# Patient Record
Sex: FEMALE | Race: BLACK OR AFRICAN AMERICAN | NOT HISPANIC OR LATINO | Employment: UNEMPLOYED | URBAN - METROPOLITAN AREA
[De-identification: names, ages, dates, MRNs, and addresses within clinical notes are randomized per-mention and may not be internally consistent; named-entity substitution may affect disease eponyms.]

---

## 2018-05-10 ENCOUNTER — OFFICE VISIT - HEALTHEAST (OUTPATIENT)
Dept: SURGERY | Facility: CLINIC | Age: 53
End: 2018-05-10

## 2018-05-10 ENCOUNTER — AMBULATORY - HEALTHEAST (OUTPATIENT)
Dept: LAB | Facility: CLINIC | Age: 53
End: 2018-05-10

## 2018-05-10 DIAGNOSIS — E66.01 MORBID OBESITY WITH BMI OF 45.0-49.9, ADULT (H): ICD-10-CM

## 2018-05-10 DIAGNOSIS — M19.90 ARTHRITIS: ICD-10-CM

## 2018-05-10 DIAGNOSIS — K91.2 POSTOPERATIVE MALABSORPTION: ICD-10-CM

## 2018-05-10 DIAGNOSIS — E66.01 MORBID OBESITY (H): ICD-10-CM

## 2018-05-10 DIAGNOSIS — I10 HYPERTENSION: ICD-10-CM

## 2018-05-10 DIAGNOSIS — Z79.1 PATIENT TAKES NSAID (NON-STEROID ANTI-INFLAMMATORY DRUG): ICD-10-CM

## 2018-05-10 DIAGNOSIS — K21.9 GERD (GASTROESOPHAGEAL REFLUX DISEASE): ICD-10-CM

## 2018-05-10 DIAGNOSIS — M25.569 KNEE PAIN: ICD-10-CM

## 2018-05-10 LAB
ALBUMIN SERPL-MCNC: 3.2 G/DL (ref 3.5–5)
ALP SERPL-CCNC: 107 U/L (ref 45–120)
ALT SERPL W P-5'-P-CCNC: 15 U/L (ref 0–45)
ANION GAP SERPL CALCULATED.3IONS-SCNC: 9 MMOL/L (ref 5–18)
AST SERPL W P-5'-P-CCNC: 19 U/L (ref 0–40)
BILIRUB SERPL-MCNC: 0.3 MG/DL (ref 0–1)
BUN SERPL-MCNC: 17 MG/DL (ref 8–22)
CALCIUM SERPL-MCNC: 9.3 MG/DL (ref 8.5–10.5)
CHLORIDE BLD-SCNC: 105 MMOL/L (ref 98–107)
CHOLEST SERPL-MCNC: 202 MG/DL
CO2 SERPL-SCNC: 25 MMOL/L (ref 22–31)
CREAT SERPL-MCNC: 1.02 MG/DL (ref 0.6–1.1)
ERYTHROCYTE [DISTWIDTH] IN BLOOD BY AUTOMATED COUNT: 14.4 % (ref 11–14.5)
FASTING STATUS PATIENT QL REPORTED: NO
FERRITIN SERPL-MCNC: 24 NG/ML (ref 10–130)
FOLATE SERPL-MCNC: 17.2 NG/ML
GFR SERPL CREATININE-BSD FRML MDRD: 57 ML/MIN/1.73M2
GLUCOSE BLD-MCNC: 143 MG/DL (ref 70–125)
HCT VFR BLD AUTO: 32.2 % (ref 35–47)
HDLC SERPL-MCNC: 45 MG/DL
HGB BLD-MCNC: 10.7 G/DL (ref 12–16)
LDLC SERPL CALC-MCNC: 81 MG/DL
MCH RBC QN AUTO: 29.6 PG (ref 27–34)
MCHC RBC AUTO-ENTMCNC: 33.2 G/DL (ref 32–36)
MCV RBC AUTO: 89 FL (ref 80–100)
PLATELET # BLD AUTO: 247 THOU/UL (ref 140–440)
PMV BLD AUTO: 12.4 FL (ref 8.5–12.5)
POTASSIUM BLD-SCNC: 3.4 MMOL/L (ref 3.5–5)
PROT SERPL-MCNC: 7.6 G/DL (ref 6–8)
PTH-INTACT SERPL-MCNC: 52 PG/ML (ref 10–86)
RBC # BLD AUTO: 3.61 MILL/UL (ref 3.8–5.4)
SODIUM SERPL-SCNC: 139 MMOL/L (ref 136–145)
TRIGL SERPL-MCNC: 380 MG/DL
TSH SERPL DL<=0.005 MIU/L-ACNC: 1.57 UIU/ML (ref 0.3–5)
VIT B12 SERPL-MCNC: 628 PG/ML (ref 213–816)
WBC: 6.4 THOU/UL (ref 4–11)

## 2018-05-10 ASSESSMENT — MIFFLIN-ST. JEOR: SCORE: 1993.89

## 2018-05-11 LAB
25(OH)D3 SERPL-MCNC: 24.1 NG/ML (ref 30–80)
HBA1C MFR BLD: 6.8 % (ref 4.2–6.1)

## 2018-05-12 LAB — ZINC SERPL-MCNC: 73 UG/DL (ref 60–120)

## 2018-05-13 LAB
ANNOTATION COMMENT IMP: NORMAL
VIT A SERPL-MCNC: 0.52 MG/L (ref 0.3–1.2)
VITAMIN A (RETINYL PALMITATE): 0.07 MG/L (ref 0–0.1)

## 2018-05-15 LAB — VIT B1 PYROPHOSHATE BLD-SCNC: 61 NMOL/L (ref 70–180)

## 2018-05-22 ENCOUNTER — AMBULATORY - HEALTHEAST (OUTPATIENT)
Dept: SURGERY | Facility: CLINIC | Age: 53
End: 2018-05-22

## 2018-05-22 DIAGNOSIS — E51.9 THIAMINE DEFICIENCY: ICD-10-CM

## 2018-05-22 DIAGNOSIS — K91.2 POSTOPERATIVE MALABSORPTION: ICD-10-CM

## 2018-05-22 DIAGNOSIS — D50.9 IRON DEFICIENCY ANEMIA: ICD-10-CM

## 2018-06-08 ENCOUNTER — COMMUNICATION - HEALTHEAST (OUTPATIENT)
Dept: SURGERY | Facility: CLINIC | Age: 53
End: 2018-06-08

## 2018-08-23 ENCOUNTER — AMBULATORY - HEALTHEAST (OUTPATIENT)
Dept: LAB | Facility: CLINIC | Age: 53
End: 2018-08-23

## 2018-08-23 ENCOUNTER — OFFICE VISIT - HEALTHEAST (OUTPATIENT)
Dept: SURGERY | Facility: CLINIC | Age: 53
End: 2018-08-23

## 2018-08-23 DIAGNOSIS — K91.2 POSTOPERATIVE MALABSORPTION: ICD-10-CM

## 2018-08-23 DIAGNOSIS — E66.01 MORBID OBESITY WITH BMI OF 45.0-49.9, ADULT (H): ICD-10-CM

## 2018-08-23 DIAGNOSIS — E51.9 THIAMINE DEFICIENCY: ICD-10-CM

## 2018-08-23 DIAGNOSIS — E11.9 DIABETES MELLITUS, TYPE 2 (H): ICD-10-CM

## 2018-08-23 ASSESSMENT — MIFFLIN-ST. JEOR: SCORE: 1993.89

## 2018-08-27 LAB — VIT B1 PYROPHOSHATE BLD-SCNC: 83 NMOL/L (ref 70–180)

## 2018-09-04 ENCOUNTER — AMBULATORY - HEALTHEAST (OUTPATIENT)
Dept: SURGERY | Facility: CLINIC | Age: 53
End: 2018-09-04

## 2018-09-04 DIAGNOSIS — E11.9 DIABETES MELLITUS, TYPE 2 (H): ICD-10-CM

## 2018-09-05 ENCOUNTER — COMMUNICATION - HEALTHEAST (OUTPATIENT)
Dept: SURGERY | Facility: CLINIC | Age: 53
End: 2018-09-05

## 2018-09-05 DIAGNOSIS — E11.9 DIABETES MELLITUS, TYPE 2 (H): ICD-10-CM

## 2018-09-07 ENCOUNTER — AMBULATORY - HEALTHEAST (OUTPATIENT)
Dept: SURGERY | Facility: CLINIC | Age: 53
End: 2018-09-07

## 2018-10-08 ENCOUNTER — COMMUNICATION - HEALTHEAST (OUTPATIENT)
Dept: SURGERY | Facility: CLINIC | Age: 53
End: 2018-10-08

## 2018-10-12 ENCOUNTER — COMMUNICATION - HEALTHEAST (OUTPATIENT)
Dept: SURGERY | Facility: CLINIC | Age: 53
End: 2018-10-12

## 2019-01-10 ENCOUNTER — COMMUNICATION - HEALTHEAST (OUTPATIENT)
Dept: SURGERY | Facility: CLINIC | Age: 54
End: 2019-01-10

## 2019-02-05 ENCOUNTER — COMMUNICATION - HEALTHEAST (OUTPATIENT)
Dept: SURGERY | Facility: CLINIC | Age: 54
End: 2019-02-05

## 2019-02-05 ENCOUNTER — OFFICE VISIT - HEALTHEAST (OUTPATIENT)
Dept: SURGERY | Facility: CLINIC | Age: 54
End: 2019-02-05

## 2019-02-05 ENCOUNTER — COMMUNICATION - HEALTHEAST (OUTPATIENT)
Dept: TELEHEALTH | Facility: CLINIC | Age: 54
End: 2019-02-05

## 2019-02-05 DIAGNOSIS — E11.9 TYPE 2 DIABETES MELLITUS WITHOUT COMPLICATION, WITHOUT LONG-TERM CURRENT USE OF INSULIN (H): ICD-10-CM

## 2019-02-05 DIAGNOSIS — K91.2 POSTOPERATIVE MALABSORPTION: ICD-10-CM

## 2019-02-05 DIAGNOSIS — E66.01 MORBID OBESITY WITH BMI OF 45.0-49.9, ADULT (H): ICD-10-CM

## 2019-02-05 DIAGNOSIS — K21.9 GASTROESOPHAGEAL REFLUX DISEASE WITHOUT ESOPHAGITIS: ICD-10-CM

## 2019-02-05 ASSESSMENT — MIFFLIN-ST. JEOR: SCORE: 1998.42

## 2019-02-19 ENCOUNTER — OFFICE VISIT - HEALTHEAST (OUTPATIENT)
Dept: SURGERY | Facility: CLINIC | Age: 54
End: 2019-02-19

## 2019-02-19 DIAGNOSIS — Z98.84 S/P LAPAROSCOPIC SLEEVE GASTRECTOMY: ICD-10-CM

## 2019-02-19 DIAGNOSIS — G89.29 CHRONIC PAIN OF RIGHT KNEE: ICD-10-CM

## 2019-02-19 DIAGNOSIS — E66.01 MORBID OBESITY WITH BMI OF 45.0-49.9, ADULT (H): ICD-10-CM

## 2019-02-19 DIAGNOSIS — M25.561 CHRONIC PAIN OF RIGHT KNEE: ICD-10-CM

## 2019-02-19 DIAGNOSIS — K21.9 GASTROESOPHAGEAL REFLUX DISEASE WITHOUT ESOPHAGITIS: ICD-10-CM

## 2019-02-19 DIAGNOSIS — I10 ESSENTIAL HYPERTENSION: ICD-10-CM

## 2019-02-19 DIAGNOSIS — E11.9 TYPE 2 DIABETES MELLITUS WITHOUT COMPLICATION, WITHOUT LONG-TERM CURRENT USE OF INSULIN (H): ICD-10-CM

## 2019-02-19 ASSESSMENT — MIFFLIN-ST. JEOR: SCORE: 1993.89

## 2019-02-21 ENCOUNTER — HOSPITAL ENCOUNTER (OUTPATIENT)
Dept: RADIOLOGY | Facility: CLINIC | Age: 54
Discharge: HOME | End: 2019-02-21
Attending: SPECIALIST

## 2019-02-21 ENCOUNTER — AMBULATORY - HEALTHEAST (OUTPATIENT)
Dept: LAB | Facility: CLINIC | Age: 54
End: 2019-02-21

## 2019-02-21 DIAGNOSIS — Z98.84 S/P LAPAROSCOPIC SLEEVE GASTRECTOMY: ICD-10-CM

## 2019-02-21 DIAGNOSIS — K91.2 POSTOPERATIVE MALABSORPTION: ICD-10-CM

## 2019-02-21 LAB
25(OH)D3 SERPL-MCNC: 38 NG/ML (ref 30–80)
ALBUMIN SERPL-MCNC: 3.6 G/DL (ref 3.5–5)
ALP SERPL-CCNC: 102 U/L (ref 45–120)
ALT SERPL W P-5'-P-CCNC: 14 U/L (ref 0–45)
ANION GAP SERPL CALCULATED.3IONS-SCNC: 7 MMOL/L (ref 5–18)
AST SERPL W P-5'-P-CCNC: 22 U/L (ref 0–40)
BILIRUB SERPL-MCNC: 0.4 MG/DL (ref 0–1)
BUN SERPL-MCNC: 10 MG/DL (ref 8–22)
CALCIUM SERPL-MCNC: 10 MG/DL (ref 8.5–10.5)
CHLORIDE BLD-SCNC: 107 MMOL/L (ref 98–107)
CHOLEST SERPL-MCNC: 212 MG/DL
CO2 SERPL-SCNC: 27 MMOL/L (ref 22–31)
CREAT SERPL-MCNC: 0.91 MG/DL (ref 0.6–1.1)
ERYTHROCYTE [DISTWIDTH] IN BLOOD BY AUTOMATED COUNT: 14.8 % (ref 11–14.5)
FASTING STATUS PATIENT QL REPORTED: ABNORMAL
FERRITIN SERPL-MCNC: 25 NG/ML (ref 10–130)
FOLATE SERPL-MCNC: 17.5 NG/ML
GFR SERPL CREATININE-BSD FRML MDRD: >60 ML/MIN/1.73M2
GLUCOSE BLD-MCNC: 89 MG/DL (ref 70–125)
HBA1C MFR BLD: 6.8 % (ref 4.2–6.1)
HCT VFR BLD AUTO: 35.6 % (ref 35–47)
HDLC SERPL-MCNC: 51 MG/DL
HGB BLD-MCNC: 11.6 G/DL (ref 12–16)
LDLC SERPL CALC-MCNC: 117 MG/DL
MCH RBC QN AUTO: 29.1 PG (ref 27–34)
MCHC RBC AUTO-ENTMCNC: 32.6 G/DL (ref 32–36)
MCV RBC AUTO: 89 FL (ref 80–100)
PLATELET # BLD AUTO: 310 THOU/UL (ref 140–440)
PMV BLD AUTO: 11.5 FL (ref 8.5–12.5)
POTASSIUM BLD-SCNC: 3.8 MMOL/L (ref 3.5–5)
PROT SERPL-MCNC: 8.3 G/DL (ref 6–8)
PTH-INTACT SERPL-MCNC: 74 PG/ML (ref 10–86)
RBC # BLD AUTO: 3.98 MILL/UL (ref 3.8–5.4)
SODIUM SERPL-SCNC: 141 MMOL/L (ref 136–145)
TRIGL SERPL-MCNC: 219 MG/DL
VIT B12 SERPL-MCNC: >2000 PG/ML (ref 213–816)
WBC: 6 THOU/UL (ref 4–11)

## 2019-02-23 LAB
VIT B1 PYROPHOSHATE BLD-SCNC: 147 NMOL/L (ref 70–180)
ZINC SERPL-MCNC: 84 UG/DL (ref 60–120)

## 2019-02-24 LAB
ANNOTATION COMMENT IMP: NORMAL
VIT A SERPL-MCNC: 0.66 MG/L (ref 0.3–1.2)
VITAMIN A (RETINYL PALMITATE): 0.02 MG/L (ref 0–0.1)

## 2019-02-25 ENCOUNTER — OFFICE VISIT - HEALTHEAST (OUTPATIENT)
Dept: SURGERY | Facility: CLINIC | Age: 54
End: 2019-02-25

## 2019-02-25 DIAGNOSIS — E66.01 MORBID OBESITY (H): ICD-10-CM

## 2019-03-01 ENCOUNTER — COMMUNICATION - HEALTHEAST (OUTPATIENT)
Dept: SURGERY | Facility: CLINIC | Age: 54
End: 2019-03-01

## 2019-03-05 ENCOUNTER — OFFICE VISIT - HEALTHEAST (OUTPATIENT)
Dept: SURGERY | Facility: CLINIC | Age: 54
End: 2019-03-05

## 2019-03-05 DIAGNOSIS — E11.9 TYPE 2 DIABETES MELLITUS WITHOUT COMPLICATION, WITHOUT LONG-TERM CURRENT USE OF INSULIN (H): ICD-10-CM

## 2019-03-05 DIAGNOSIS — E66.01 OBESITY, MORBID, BMI 40.0-49.9 (H): ICD-10-CM

## 2019-03-05 DIAGNOSIS — Z98.84 S/P LAPAROSCOPIC SLEEVE GASTRECTOMY: ICD-10-CM

## 2019-03-05 DIAGNOSIS — Z71.3 DIETARY COUNSELING: ICD-10-CM

## 2019-03-05 ASSESSMENT — MIFFLIN-ST. JEOR: SCORE: 1993.89

## 2019-03-18 ENCOUNTER — OFFICE VISIT - HEALTHEAST (OUTPATIENT)
Dept: SURGERY | Facility: CLINIC | Age: 54
End: 2019-03-18

## 2019-03-18 DIAGNOSIS — E66.01 MORBID OBESITY (H): ICD-10-CM

## 2019-04-02 ENCOUNTER — OFFICE VISIT - HEALTHEAST (OUTPATIENT)
Dept: SURGERY | Facility: CLINIC | Age: 54
End: 2019-04-02

## 2019-04-02 DIAGNOSIS — E66.01 OBESITY, MORBID, BMI 40.0-49.9 (H): ICD-10-CM

## 2019-04-02 DIAGNOSIS — Z98.84 S/P LAPAROSCOPIC SLEEVE GASTRECTOMY: ICD-10-CM

## 2019-04-02 DIAGNOSIS — Z71.3 DIETARY COUNSELING: ICD-10-CM

## 2019-04-02 ASSESSMENT — MIFFLIN-ST. JEOR: SCORE: 1993.89

## 2019-04-08 ENCOUNTER — OFFICE VISIT - HEALTHEAST (OUTPATIENT)
Dept: SURGERY | Facility: CLINIC | Age: 54
End: 2019-04-08

## 2019-04-08 DIAGNOSIS — E66.01 MORBID OBESITY (H): ICD-10-CM

## 2019-04-16 ENCOUNTER — OFFICE VISIT - HEALTHEAST (OUTPATIENT)
Dept: SURGERY | Facility: CLINIC | Age: 54
End: 2019-04-16

## 2019-04-16 DIAGNOSIS — E11.9 TYPE 2 DIABETES MELLITUS WITHOUT COMPLICATION, WITHOUT LONG-TERM CURRENT USE OF INSULIN (H): ICD-10-CM

## 2019-04-16 DIAGNOSIS — M17.11 PRIMARY OSTEOARTHRITIS OF RIGHT KNEE: ICD-10-CM

## 2019-04-16 DIAGNOSIS — Z90.3 HISTORY OF SLEEVE GASTRECTOMY: ICD-10-CM

## 2019-04-16 DIAGNOSIS — E66.01 MORBID OBESITY WITH BMI OF 45.0-49.9, ADULT (H): ICD-10-CM

## 2019-04-16 ASSESSMENT — MIFFLIN-ST. JEOR
SCORE: 2002.96
SCORE: 2002.96

## 2019-04-18 ENCOUNTER — AMBULATORY - HEALTHEAST (OUTPATIENT)
Dept: SURGERY | Facility: CLINIC | Age: 54
End: 2019-04-18

## 2019-05-06 ENCOUNTER — AMBULATORY - HEALTHEAST (OUTPATIENT)
Dept: SURGERY | Facility: CLINIC | Age: 54
End: 2019-05-06

## 2019-08-19 ENCOUNTER — RECORDS - HEALTHEAST (OUTPATIENT)
Dept: ADMINISTRATIVE | Facility: OTHER | Age: 54
End: 2019-08-19

## 2019-08-20 ENCOUNTER — AMBULATORY - HEALTHEAST (OUTPATIENT)
Dept: SURGERY | Facility: CLINIC | Age: 54
End: 2019-08-20

## 2019-08-20 DIAGNOSIS — Z01.818 PRE-OP TESTING: ICD-10-CM

## 2019-08-21 ENCOUNTER — AMBULATORY - HEALTHEAST (OUTPATIENT)
Dept: SURGERY | Facility: CLINIC | Age: 54
End: 2019-08-21

## 2019-08-21 DIAGNOSIS — K91.2 POSTSURGICAL MALABSORPTION: ICD-10-CM

## 2019-08-21 DIAGNOSIS — K21.9 ACID REFLUX: ICD-10-CM

## 2019-08-21 DIAGNOSIS — K90.9 INTESTINAL MALABSORPTION, UNSPECIFIED: ICD-10-CM

## 2019-08-21 DIAGNOSIS — Z98.84 S/P BARIATRIC SURGERY: ICD-10-CM

## 2019-08-21 DIAGNOSIS — R63.4 RAPID WEIGHT LOSS: ICD-10-CM

## 2019-08-21 DIAGNOSIS — Z71.89 ENCOUNTER FOR PRE-BARIATRIC SURGERY COUNSELING AND EDUCATION: ICD-10-CM

## 2019-08-21 ASSESSMENT — MIFFLIN-ST. JEOR: SCORE: 1962.13

## 2019-08-27 ENCOUNTER — RECORDS - HEALTHEAST (OUTPATIENT)
Dept: ADMINISTRATIVE | Facility: OTHER | Age: 54
End: 2019-08-27

## 2019-09-03 ENCOUNTER — RECORDS - HEALTHEAST (OUTPATIENT)
Dept: ADMINISTRATIVE | Facility: OTHER | Age: 54
End: 2019-09-03

## 2019-09-10 ENCOUNTER — RECORDS - HEALTHEAST (OUTPATIENT)
Dept: ADMINISTRATIVE | Facility: OTHER | Age: 54
End: 2019-09-10

## 2019-09-10 ENCOUNTER — ANESTHESIA - HEALTHEAST (OUTPATIENT)
Dept: SURGERY | Facility: CLINIC | Age: 54
End: 2019-09-10

## 2019-12-02 ENCOUNTER — HOSPITAL ENCOUNTER (EMERGENCY)
Facility: CLINIC | Age: 54
Discharge: HOME | End: 2019-12-02
Attending: PHYSICIAN ASSISTANT | Admitting: PHYSICIAN ASSISTANT
Payer: MEDICAID

## 2019-12-02 ENCOUNTER — APPOINTMENT (OUTPATIENT)
Dept: GENERAL RADIOLOGY | Facility: CLINIC | Age: 54
End: 2019-12-02
Attending: PHYSICIAN ASSISTANT
Payer: MEDICAID

## 2019-12-02 VITALS
TEMPERATURE: 99.9 F | DIASTOLIC BLOOD PRESSURE: 73 MMHG | SYSTOLIC BLOOD PRESSURE: 122 MMHG | OXYGEN SATURATION: 99 % | RESPIRATION RATE: 18 BRPM | WEIGHT: 288.8 LBS | HEART RATE: 77 BPM

## 2019-12-02 DIAGNOSIS — R07.9 CHEST PAIN: ICD-10-CM

## 2019-12-02 DIAGNOSIS — R05.9 COUGH: ICD-10-CM

## 2019-12-02 LAB
ANION GAP SERPL CALCULATED.3IONS-SCNC: 3 MMOL/L (ref 3–14)
BASOPHILS # BLD AUTO: 0 10E9/L (ref 0–0.2)
BASOPHILS NFR BLD AUTO: 0.2 %
BUN SERPL-MCNC: 12 MG/DL (ref 7–30)
CALCIUM SERPL-MCNC: 8.8 MG/DL (ref 8.5–10.1)
CHLORIDE SERPL-SCNC: 111 MMOL/L (ref 94–109)
CO2 SERPL-SCNC: 27 MMOL/L (ref 20–32)
CREAT SERPL-MCNC: 0.82 MG/DL (ref 0.52–1.04)
D DIMER PPP FEU-MCNC: 0.4 UG/ML FEU (ref 0–0.5)
DIFFERENTIAL METHOD BLD: ABNORMAL
EOSINOPHIL # BLD AUTO: 0.3 10E9/L (ref 0–0.7)
EOSINOPHIL NFR BLD AUTO: 3.4 %
ERYTHROCYTE [DISTWIDTH] IN BLOOD BY AUTOMATED COUNT: 14.5 % (ref 10–15)
FLUAV+FLUBV AG SPEC QL: NEGATIVE
FLUAV+FLUBV AG SPEC QL: NEGATIVE
GFR SERPL CREATININE-BSD FRML MDRD: 81 ML/MIN/{1.73_M2}
GLUCOSE SERPL-MCNC: 118 MG/DL (ref 70–99)
HCT VFR BLD AUTO: 31.7 % (ref 35–47)
HGB BLD-MCNC: 10.3 G/DL (ref 11.7–15.7)
IMM GRANULOCYTES # BLD: 0 10E9/L (ref 0–0.4)
IMM GRANULOCYTES NFR BLD: 0.4 %
INTERPRETATION ECG - MUSE: NORMAL
LYMPHOCYTES # BLD AUTO: 2.6 10E9/L (ref 0.8–5.3)
LYMPHOCYTES NFR BLD AUTO: 31.5 %
MCH RBC QN AUTO: 28.7 PG (ref 26.5–33)
MCHC RBC AUTO-ENTMCNC: 32.5 G/DL (ref 31.5–36.5)
MCV RBC AUTO: 88 FL (ref 78–100)
MONOCYTES # BLD AUTO: 0.4 10E9/L (ref 0–1.3)
MONOCYTES NFR BLD AUTO: 4.8 %
NEUTROPHILS # BLD AUTO: 4.9 10E9/L (ref 1.6–8.3)
NEUTROPHILS NFR BLD AUTO: 59.7 %
NRBC # BLD AUTO: 0 10*3/UL
NRBC BLD AUTO-RTO: 0 /100
PLATELET # BLD AUTO: 250 10E9/L (ref 150–450)
POTASSIUM SERPL-SCNC: 3.4 MMOL/L (ref 3.4–5.3)
RBC # BLD AUTO: 3.59 10E12/L (ref 3.8–5.2)
SODIUM SERPL-SCNC: 141 MMOL/L (ref 133–144)
SPECIMEN SOURCE: NORMAL
TROPONIN I SERPL-MCNC: <0.015 UG/L (ref 0–0.04)
WBC # BLD AUTO: 8.2 10E9/L (ref 4–11)

## 2019-12-02 PROCEDURE — 25000132 ZZH RX MED GY IP 250 OP 250 PS 637: Performed by: PHYSICIAN ASSISTANT

## 2019-12-02 PROCEDURE — 93005 ELECTROCARDIOGRAM TRACING: CPT

## 2019-12-02 PROCEDURE — 94640 AIRWAY INHALATION TREATMENT: CPT

## 2019-12-02 PROCEDURE — 99285 EMERGENCY DEPT VISIT HI MDM: CPT | Mod: 25

## 2019-12-02 PROCEDURE — 85379 FIBRIN DEGRADATION QUANT: CPT | Performed by: PHYSICIAN ASSISTANT

## 2019-12-02 PROCEDURE — 25000125 ZZHC RX 250: Performed by: PHYSICIAN ASSISTANT

## 2019-12-02 PROCEDURE — 71046 X-RAY EXAM CHEST 2 VIEWS: CPT

## 2019-12-02 PROCEDURE — 80048 BASIC METABOLIC PNL TOTAL CA: CPT | Performed by: PHYSICIAN ASSISTANT

## 2019-12-02 PROCEDURE — 84484 ASSAY OF TROPONIN QUANT: CPT | Performed by: PHYSICIAN ASSISTANT

## 2019-12-02 PROCEDURE — 85025 COMPLETE CBC W/AUTO DIFF WBC: CPT | Performed by: PHYSICIAN ASSISTANT

## 2019-12-02 PROCEDURE — 87804 INFLUENZA ASSAY W/OPTIC: CPT | Mod: 59 | Performed by: PHYSICIAN ASSISTANT

## 2019-12-02 PROCEDURE — 87804 INFLUENZA ASSAY W/OPTIC: CPT | Performed by: PHYSICIAN ASSISTANT

## 2019-12-02 RX ORDER — ASPIRIN 81 MG/1
324 TABLET, CHEWABLE ORAL ONCE
Status: COMPLETED | OUTPATIENT
Start: 2019-12-02 | End: 2019-12-02

## 2019-12-02 RX ORDER — KETOROLAC TROMETHAMINE 15 MG/ML
15 INJECTION, SOLUTION INTRAMUSCULAR; INTRAVENOUS ONCE
Status: DISCONTINUED | OUTPATIENT
Start: 2019-12-02 | End: 2019-12-02 | Stop reason: HOSPADM

## 2019-12-02 RX ORDER — OXYCODONE HYDROCHLORIDE 5 MG/1
5 TABLET ORAL ONCE
Status: COMPLETED | OUTPATIENT
Start: 2019-12-02 | End: 2019-12-02

## 2019-12-02 RX ORDER — IPRATROPIUM BROMIDE AND ALBUTEROL SULFATE 2.5; .5 MG/3ML; MG/3ML
3 SOLUTION RESPIRATORY (INHALATION) ONCE
Status: COMPLETED | OUTPATIENT
Start: 2019-12-02 | End: 2019-12-02

## 2019-12-02 RX ORDER — BENZONATATE 200 MG/1
200 CAPSULE ORAL 3 TIMES DAILY PRN
Qty: 15 CAPSULE | Refills: 0 | Status: SHIPPED | OUTPATIENT
Start: 2019-12-02 | End: 2021-10-01

## 2019-12-02 RX ADMIN — ASPIRIN 81 MG 324 MG: 81 TABLET ORAL at 18:08

## 2019-12-02 RX ADMIN — IPRATROPIUM BROMIDE AND ALBUTEROL SULFATE 3 ML: .5; 3 SOLUTION RESPIRATORY (INHALATION) at 18:08

## 2019-12-02 RX ADMIN — OXYCODONE HYDROCHLORIDE 5 MG: 5 TABLET ORAL at 19:46

## 2019-12-02 ASSESSMENT — ENCOUNTER SYMPTOMS
DYSURIA: 0
HEMATURIA: 0
RHINORRHEA: 1
SORE THROAT: 1
COUGH: 1
SHORTNESS OF BREATH: 1

## 2019-12-02 NOTE — LETTER
December 2, 2019      To Whom It May Concern:      Shana Ventura was seen in our Emergency Department today, 12/02/19. Please excuse her from work from 12/2-12/3. I expect her condition to improve over the next 2-3 days.  She may return to work/school when improved.    Sincerely,        Haley Ray PA-C

## 2019-12-02 NOTE — ED TRIAGE NOTES
Pt having cough and cold symptoms for a few days.  Today pt c/o more difficulty with breathing and pain in the bilateral ribs.

## 2019-12-02 NOTE — ED PROVIDER NOTES
History     Chief Complaint:  Cough and Shortness of Breath      HPI   Shana Ventura is a 54 year old female with a history of type 2 diabetes and tobacco use for the past two months (5-6 cigarettes daily), who presents with cough and cold symptoms for the past few days, with associated pluritic bilateral rib pain, left worse than right that radiates to her chest. Patient reports that she has had to take shallow breaths since onset, which has made her feel short of breath. She also endorses rhinorrhea, sore throat, and left ear pain. She describes worsened rib pain when taking a deep breath, when coughing, or when laying down. She reports taking sinus and cold medicine and Nyquil, with no relief. She notes that her son had similar symptoms recently and says he was sleeping in their bed when he was sick. 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 Denies leg pain or swelling, dysuria, hematuria, rash, or any other complaints. No history of asthma. No personal or family history of blood clots in the legs or lungs.     Allergies:  Codeine sulfate  Penicillins     Medications:    Toradol   Aspirin 81 mg   Zofran  Elavil  Cymbalta    Past Medical History:    DM 2  DKA  Obesity  Breast abscess   Varicella   Headache  Urinary incontinence, stress female  Carbuncle and furuncle of unspecified site    Past Surgical History:    Hysterectomy  Carpal tunnel release  SD ligate fallopian tube  Lap sleeve gastrectomy & lap umbilical hernia repair     Family History:    History reviewed. No pertinent family history.      Social History:  Smoking status: former   Alcohol use: no   PCP: Natalia Sanchez  Marital Status:        Review of Systems   HENT: Positive for congestion, ear pain, rhinorrhea and sore throat.    Respiratory: Positive for cough and shortness of breath.    Cardiovascular: Negative for chest pain and leg swelling.   Genitourinary: Negative for dysuria and hematuria.    Musculoskeletal:        Positive for rib pain.    Skin: Negative for rash.   All other systems reviewed and are negative.    Physical Exam     Patient Vitals for the past 24 hrs:   BP Temp Temp src Pulse Heart Rate Resp SpO2 Weight   12/02/19 1945 122/73 -- -- 77 -- -- 99 % --   12/02/19 1915 113/71 -- -- 80 86 -- 94 % --   12/02/19 1900 121/76 -- -- 89 86 -- 95 % --   12/02/19 1800 139/86 -- -- 71 68 -- 100 % --   12/02/19 1607 (!) 157/132 99.9  F (37.7  C) Oral 77 -- 18 100 % 131 kg (288 lb 12.8 oz)      Physical Exam  General: Resting comfortably.  Alert and oriented.   Head:  The scalp, face, and head appear normal   Eyes:  Conjunctivae and sclerae are normal    ENT:    The oropharynx is normal    Uvula is in the midline    Neck:  No lymphadenopathy  CV:  Regular rate and rhythm     Normal S1/S2    Peripheral pulses intact in all 4 extremities.  Resp:  Lungs are clear to auscultation    Non-labored    No rales or wheezing   MS:  Moving all 4 extremities with good strength.  Ambulatory.  Skin:  No rash or acute skin lesions noted   Neuro: Speech is normal and fluent.     Emergency Department Course   ECG:  ECG (17:39:24):  Rate 63 bpm. AZ interval 164. QRS duration 78. QT/QTc 420/429. P-R-T axes 37 10 21. Normal sinus rhythm. Normal ECG. Agree with computer interpretation. No prior EKG for comparison. Interpreted at 1748 by Leonardo Cordoba MD.     Imaging:  Radiographic findings were communicated with the patient who voiced understanding of the findings.    Chest XR, PA & LAT  Lungs clear. Heart size normal. Degenerative change thoracic spine  As read by Radiology.    Laboratory:  D dimer: 0.4  BMP: Glucose 118 (H), chloride 111 (H), o/w WNL (Creatinine: 0.82)  CBC: WBC 8.2, HGB 10.3,    Troponin 1 (1820): <0.015    Influenza A/B antigen: Influenza A negative, Influenza B negative     Interventions:  1808: aspirin 324 mg PO  1808: Duoneb 3 ml Nebulization   1946: Oxycodone 5 mg PO  1947: Toradol 15 mg  IV    Emergency Department Course:  1710: Nursing notes and vitals reviewed. I performed an exam of the patient as documented above.     Medicine administered as documented above. Blood drawn. Influenza swab taken. This was sent to the lab for further testing, results above.    The patient was sent for a chest xray while in the emergency department, findings above.     1852: I rechecked the patient and discussed the results of her workup thus far.     2011:  I rechecked the patient and discussed the results of her workup thus far.     Findings and plan explained to the Patient. Patient discharged home with instructions regarding supportive care, medications, and reasons to return. The importance of close follow-up was reviewed. The patient was prescribed Tessalon.    I personally reviewed the laboratory results with the Patient and answered all related questions prior to discharge.       Impression & Plan      Medical Decision Making:  Shana Ventura is a 54 year old female who presents for evaluation of cough, associated pleuritic chest pain.  Please refer the HPI for further details.  A broad differential was considered including but not limited to ACS, PE, dissection, pleural effusion, pericarditis, myocarditis, pneumonia, pleurisy, costochondritis, pneumothorax, among others.  EKG is  normal without signs of ischemia or arrhythmia.  Troponin is within normal limits.  Labs are unremarkable.  D-dimer is normal, therefore I do not believe further work-up for PE needs to be pursued at this time given the likelihood of PE is very unlikely given the negative d-dimer.  Chest x-ray is obtained and was negative.  Overall, I believe the patient is safe to discharge home.  This is likely costochondritis or pleurisy.  No signs of life-threatening etiologies to suggest need for further work-up at this time.  Will treat cough with Tessalon.  Patient cannot take ibuprofen as she has had a history of gastric bypass.  Suggested  use of Tylenol.  She will follow-up with her primary physician in 2 to 3 days for recheck.  She was asked to return immediately for any uncontrolled fevers, worsening symptoms, new/worsening chest pain, shortness of breath, or any other concerns.  All questions were answered prior to discharge.  Patient understands and agrees to this plan.    Diagnosis:    ICD-10-CM    1. Cough R05    2. Chest pain R07.9        Disposition:  discharged to home    Discharge Medications:  Discharge Medication List as of 12/2/2019  8:17 PM      START taking these medications    Details   benzonatate (TESSALON) 200 MG capsule Take 1 capsule (200 mg) by mouth 3 times daily as needed for cough, Disp-15 capsule, R-0, Local Print           ILidia, am serving as a scribe at 5:10 PM on 12/2/2019 to document services personally performed by Haley Ray PA based on my observations and the provider's statements to me.        Lidia Godinez  12/2/2019   [facility] EMERGENCY DEPARTMENT       Haley Ray PA-C  12/02/19 [id number]

## 2019-12-02 NOTE — ED AVS SNAPSHOT
[facility]  [address]  [facility] [address]  Phone:  [phone]  Fax:  [phone]                                    Shana Ventura   MRN: [id number]    Department:  [facility]   Date of Visit:  12/2/2019           After Visit Summary Signature Page    I have received my discharge instructions, and my questions have been answered. I have discussed any challenges I see with this plan with the nurse or doctor.    ..........................................................................................................................................  Patient/Patient Representative Signature      ..........................................................................................................................................  Patient Representative Print Name and Relationship to Patient    ..................................................               ................................................  Date                                   Time    ..........................................................................................................................................  Reviewed by Signature/Title    ...................................................              ..............................................  Date                                               Time          22EPIC Rev 08/18

## 2019-12-03 NOTE — DISCHARGE INSTRUCTIONS

## 2020-06-12 ENCOUNTER — COMMUNICATION - HEALTHEAST (OUTPATIENT)
Dept: SURGERY | Facility: CLINIC | Age: 55
End: 2020-06-12

## 2020-06-12 DIAGNOSIS — K90.9 INTESTINAL MALABSORPTION, UNSPECIFIED: ICD-10-CM

## 2020-06-12 DIAGNOSIS — Z98.84 S/P BARIATRIC SURGERY: ICD-10-CM

## 2020-06-12 DIAGNOSIS — K91.2 POSTSURGICAL MALABSORPTION: ICD-10-CM

## 2020-07-21 ENCOUNTER — OFFICE VISIT - HEALTHEAST (OUTPATIENT)
Dept: SURGERY | Facility: CLINIC | Age: 55
End: 2020-07-21

## 2020-07-21 DIAGNOSIS — K91.2 POSTOPERATIVE MALABSORPTION: ICD-10-CM

## 2020-07-21 DIAGNOSIS — K21.9 GASTROESOPHAGEAL REFLUX DISEASE WITHOUT ESOPHAGITIS: ICD-10-CM

## 2020-07-21 DIAGNOSIS — R63.8 DIFFICULTY EATING: ICD-10-CM

## 2020-07-21 ASSESSMENT — MIFFLIN-ST. JEOR: SCORE: 1957.6

## 2020-07-23 ENCOUNTER — AMBULATORY - HEALTHEAST (OUTPATIENT)
Dept: LAB | Facility: CLINIC | Age: 55
End: 2020-07-23

## 2020-07-23 ENCOUNTER — HOSPITAL ENCOUNTER (OUTPATIENT)
Dept: RADIOLOGY | Facility: CLINIC | Age: 55
Discharge: HOME | End: 2020-07-23
Attending: FAMILY MEDICINE

## 2020-07-23 DIAGNOSIS — R63.8 DIFFICULTY EATING: ICD-10-CM

## 2020-07-23 DIAGNOSIS — K91.2 POSTOPERATIVE MALABSORPTION: ICD-10-CM

## 2020-07-23 LAB
25(OH)D3 SERPL-MCNC: 31.1 NG/ML (ref 30–80)
ALBUMIN SERPL-MCNC: 3.7 G/DL (ref 3.5–5)
ALP SERPL-CCNC: 114 U/L (ref 45–120)
ALT SERPL W P-5'-P-CCNC: 19 U/L (ref 0–45)
ANION GAP SERPL CALCULATED.3IONS-SCNC: 8 MMOL/L (ref 5–18)
AST SERPL W P-5'-P-CCNC: 27 U/L (ref 0–40)
BILIRUB SERPL-MCNC: 0.3 MG/DL (ref 0–1)
BUN SERPL-MCNC: 14 MG/DL (ref 8–22)
CALCIUM SERPL-MCNC: 9.7 MG/DL (ref 8.5–10.5)
CHLORIDE BLD-SCNC: 104 MMOL/L (ref 98–107)
CHOLEST SERPL-MCNC: 194 MG/DL
CO2 SERPL-SCNC: 26 MMOL/L (ref 22–31)
CREAT SERPL-MCNC: 0.89 MG/DL (ref 0.6–1.1)
ERYTHROCYTE [DISTWIDTH] IN BLOOD BY AUTOMATED COUNT: 15.7 % (ref 11–14.5)
FASTING STATUS PATIENT QL REPORTED: YES
FERRITIN SERPL-MCNC: 15 NG/ML (ref 10–130)
FOLATE SERPL-MCNC: 17.3 NG/ML
GFR SERPL CREATININE-BSD FRML MDRD: >60 ML/MIN/1.73M2
GLUCOSE BLD-MCNC: 118 MG/DL (ref 70–125)
HBA1C MFR BLD: 8.1 %
HCT VFR BLD AUTO: 37.2 % (ref 35–47)
HDLC SERPL-MCNC: 48 MG/DL
HGB BLD-MCNC: 11.5 G/DL (ref 12–16)
LDLC SERPL CALC-MCNC: 107 MG/DL
MCH RBC QN AUTO: 26.4 PG (ref 27–34)
MCHC RBC AUTO-ENTMCNC: 30.9 G/DL (ref 32–36)
MCV RBC AUTO: 86 FL (ref 80–100)
PLATELET # BLD AUTO: 242 THOU/UL (ref 140–440)
PMV BLD AUTO: 12.4 FL (ref 8.5–12.5)
POTASSIUM BLD-SCNC: 4.4 MMOL/L (ref 3.5–5)
PROT SERPL-MCNC: 7.7 G/DL (ref 6–8)
PTH-INTACT SERPL-MCNC: 92 PG/ML (ref 10–86)
RBC # BLD AUTO: 4.35 MILL/UL (ref 3.8–5.4)
SODIUM SERPL-SCNC: 138 MMOL/L (ref 136–145)
TRIGL SERPL-MCNC: 196 MG/DL
VIT B12 SERPL-MCNC: 809 PG/ML (ref 213–816)
WBC: 6.4 THOU/UL (ref 4–11)

## 2020-07-24 ENCOUNTER — COMMUNICATION - HEALTHEAST (OUTPATIENT)
Dept: SURGERY | Facility: CLINIC | Age: 55
End: 2020-07-24

## 2020-07-25 LAB
ANNOTATION COMMENT IMP: NORMAL
VIT A SERPL-MCNC: 0.54 MG/L (ref 0.3–1.2)
VITAMIN A (RETINYL PALMITATE): <0.02 MG/L (ref 0–0.1)

## 2020-07-26 LAB
VIT B1 PYROPHOSHATE BLD-SCNC: 79 NMOL/L (ref 70–180)
ZINC SERPL-MCNC: 71.4 UG/DL (ref 60–120)

## 2020-07-31 ENCOUNTER — COMMUNICATION - HEALTHEAST (OUTPATIENT)
Dept: SURGERY | Facility: CLINIC | Age: 55
End: 2020-07-31

## 2020-10-07 ENCOUNTER — COMMUNICATION - HEALTHEAST (OUTPATIENT)
Dept: SURGERY | Facility: CLINIC | Age: 55
End: 2020-10-07

## 2020-10-07 DIAGNOSIS — K90.9 INTESTINAL MALABSORPTION, UNSPECIFIED: ICD-10-CM

## 2020-10-07 DIAGNOSIS — K91.2 POSTOPERATIVE MALABSORPTION: ICD-10-CM

## 2020-10-07 DIAGNOSIS — E21.1 HYPERPARATHYROIDISM DUE TO VITAMIN D DEFICIENCY (H): ICD-10-CM

## 2020-10-07 DIAGNOSIS — E11.9 TYPE 2 DIABETES MELLITUS WITHOUT COMPLICATION, WITHOUT LONG-TERM CURRENT USE OF INSULIN (H): ICD-10-CM

## 2021-01-14 ENCOUNTER — OFFICE VISIT - HEALTHEAST (OUTPATIENT)
Dept: SURGERY | Facility: CLINIC | Age: 56
End: 2021-01-14

## 2021-01-14 DIAGNOSIS — E66.01 MORBID OBESITY WITH BMI OF 45.0-49.9, ADULT (H): ICD-10-CM

## 2021-01-14 DIAGNOSIS — E11.9 TYPE 2 DIABETES MELLITUS WITHOUT COMPLICATION, WITHOUT LONG-TERM CURRENT USE OF INSULIN (H): ICD-10-CM

## 2021-01-14 DIAGNOSIS — K91.2 POSTOPERATIVE MALABSORPTION: ICD-10-CM

## 2021-01-14 ASSESSMENT — MIFFLIN-ST. JEOR: SCORE: 1980.28

## 2021-01-20 ENCOUNTER — COMMUNICATION - HEALTHEAST (OUTPATIENT)
Dept: SURGERY | Facility: CLINIC | Age: 56
End: 2021-01-20

## 2021-01-20 ENCOUNTER — OFFICE VISIT - HEALTHEAST (OUTPATIENT)
Dept: SURGERY | Facility: CLINIC | Age: 56
End: 2021-01-20

## 2021-01-20 DIAGNOSIS — E11.9 TYPE 2 DIABETES MELLITUS WITHOUT COMPLICATION, WITHOUT LONG-TERM CURRENT USE OF INSULIN (H): ICD-10-CM

## 2021-01-20 DIAGNOSIS — Z98.84 BARIATRIC SURGERY STATUS: ICD-10-CM

## 2021-01-20 DIAGNOSIS — E66.01 MORBID OBESITY WITH BMI OF 45.0-49.9, ADULT (H): ICD-10-CM

## 2021-02-05 ENCOUNTER — OFFICE VISIT - HEALTHEAST (OUTPATIENT)
Dept: SURGERY | Facility: CLINIC | Age: 56
End: 2021-02-05

## 2021-02-05 DIAGNOSIS — E66.01 MORBID OBESITY WITH BMI OF 45.0-49.9, ADULT (H): ICD-10-CM

## 2021-03-08 ENCOUNTER — OFFICE VISIT - HEALTHEAST (OUTPATIENT)
Dept: SURGERY | Facility: CLINIC | Age: 56
End: 2021-03-08

## 2021-03-08 DIAGNOSIS — E66.01 MORBID OBESITY WITH BMI OF 45.0-49.9, ADULT (H): ICD-10-CM

## 2021-03-29 ENCOUNTER — COMMUNICATION - HEALTHEAST (OUTPATIENT)
Dept: SURGERY | Facility: CLINIC | Age: 56
End: 2021-03-29

## 2021-03-29 DIAGNOSIS — Z98.84 S/P BARIATRIC SURGERY: ICD-10-CM

## 2021-03-29 DIAGNOSIS — K91.2 POSTSURGICAL MALABSORPTION: ICD-10-CM

## 2021-03-29 DIAGNOSIS — K90.9 INTESTINAL MALABSORPTION, UNSPECIFIED: ICD-10-CM

## 2021-05-27 NOTE — PROGRESS NOTES
I have submitted a prior authorization request on behalf of this patient to her Primary [employer] as well as her Medicaid insurances to be approved for a revision of her LSG to a LRNY with Dr. Catarino Nathan.

## 2021-05-27 NOTE — PROGRESS NOTES
HPI: Shana Ventura is a 53 y.o. female who is status post a laparoscopic sleeve gastrectomy in .  At that time she weighed about 308 pounds.  She is able to lose a significant amount of weight after the sleeve gastrectomy.  She was diabetic at the time of surgery and that resolved.  However she states in  she had several family crises that occurred.  She had 2 sisters  within a short time as well as her father.  She states this caused quite a bit of stress eating and after that time she began to regain her weight.  She also noticed that with her weight gain she redeveloped her diabetes.  She was started on metformin.  She states that her A1c's were always in fairly good control and her A1c's that we have checked have been under 7.  She has been mostly on metformin but had Victoza added recently to help with some weight loss.  Efforts to lose weight if not been successful and she continues to gain weight.  She also has fairly significant degenerative joint disease and has been receiving injections into her right knee to help her with the discomfort.  She is been told by her orthopedic surgeon that she is really on the young side to have a total joint and it would be better to lose weight and see if that does not help with her comfort level and mobility.  She is interested in having her sleeve gastrectomy converted to a different operation to help achieve weight loss and get her diabetes resolved.      Allergies:Penicillins and Codeine    Past Medical History:   Diagnosis Date     Arthritis     right knee     Diabetes mellitus (H)      Fibromyalgia, primary      GERD (gastroesophageal reflux disease)     after her sleeve     Knee pain      Morbid obesity (H)        Past Surgical History:   Procedure Laterality Date      SECTION, CLASSIC  1997     GASTRECTOMY LAPAROSCOPIC SLEEVE      Dr. Nathan     PARTIAL HYSTERECTOMY         CURRENT MEDS:  Current Outpatient Medications  "  Medication Sig Dispense Refill     cholecalciferol, vitamin D3, (VITAMIN D3) 5,000 unit Tab Take 1 tablet (5,000 Units total) by mouth daily. 90 tablet prn     cyanocobalamin, vitamin B-12, 1,000 mcg Subl Place 1 tablet (1,000 mcg total) under the tongue daily. 90 tablet prn     ergocalciferol (ERGOCALCIFEROL) 50,000 unit capsule Take 50,000 Units by mouth once a week.       hydroCHLOROthiazide (HYDRODIURIL) 25 MG tablet Take 25 mg by mouth daily.       liraglutide (VICTOZA) 0.6 mg/0.1 mL (18 mg/3 mL) PnIj injection Inject 1.8 mg under the skin daily. 27 mL prn     metFORMIN (GLUCOPHAGE) 1000 MG tablet Take 1,000 mg by mouth daily.       omeprazole (PRILOSEC) 20 MG capsule Take 1 capsule (20 mg total) by mouth daily. 90 capsule prn     pen needle, diabetic 32 gauge x 5/32\" Ndle Inject 1 Units under the skin daily. 100 each prn     PRENATAL VITAMIN PLUS LOW IRON 27 mg iron- 1 mg Tab Take 2 tablets by mouth daily.  2     thiamine HCl, vitamin B1, (VITAMIN B-1) 250 MG tablet Take 1 tablet (250 mg total) by mouth daily. 90 tablet prn     triamcinolone (KENALOG) 0.1 % ointment Apply thin layer on effected area       No current facility-administered medications for this visit.          Family History   Problem Relation Age of Onset     Cancer Mother      Diabetes Father      Obesity Sister         reports that she has quit smoking. She has never used smokeless tobacco. She reports that she does not drink alcohol or use drugs.    Review of Systems -  A 12 point ROS was reviewed and except for what is listed in the HPI above, all others are negative  PSYCHIATRIC: She has undergone a lifestyle assessment and has been deemed a good candidate for bariatric surgery by the psychologist.    /69   Pulse 70   Resp 18   Ht 5' 7\" (1.702 m)   Wt (!) 301 lb (136.5 kg)   SpO2 100%   BMI 47.14 kg/m    Wt Readings from Last 3 Encounters:   04/16/19 (!) 301 lb (136.5 kg)   04/16/19 (!) 301 lb (136.5 kg)   04/09/19 (!) 301 lb " 8 oz (136.8 kg)     Body mass index is 47.14 kg/m .    EXAM:  GENERAL: This is a well-developed 53 y.o. female who appears her stated age  HEAD & NECK: Grossly normal.  No palpable thyroid lesions  CARDIAC: RRR without murmur  CHEST/LUNG:  Clear to auscultation  ABDOMEN: Obese.  Nontender.  No hernias or masses appreciated.  LYMPHATIC:  No significant adenopathy appreciated.    EXTREMITIES: Grossly normal.  No evidence of chronic venous stasis.    NEUROLOGIC: Focally intact  INTEGUMENT: No open lesions or ulcers  PSYCHIATRIC: Normal affect. She has a good grasp on the nature of her obesity and the treatment options.    LABS:  Lab Results   Component Value Date    WBC 6.0 02/21/2019    WBC 5.6 09/18/2014    HGB 11.6 (L) 02/21/2019    HCT 35.6 02/21/2019    MCV 89 02/21/2019     02/21/2019     INR/Prothrombin Time      Lab Results   Component Value Date    HGBA1C 6.8 (H) 02/21/2019     Lab Results   Component Value Date    ALT 14 02/21/2019    AST 22 02/21/2019    ALKPHOS 102 02/21/2019    BILITOT 0.4 02/21/2019       Assessment/Plan: 53 y.o. female who is status post a sleeve gastrectomy about 7 years ago.  She had initially done well for the first 4 years but unfortunately due to family crises she began stress eating and ended up regaining much of her weight back.  Her diabetes is recurred.  She is interested in having her sleeve gastrectomy converted to a different operation.  I concur with this.  I think the patient's weight gain and diabetes have occurred in the face of her sleeve gastrectomy I think she would be an excellent candidate for conversion to a Aravind-en-Y gastric bypass.  I think this would help her achieve weight loss as well as remission of her diabetes.  Spent quite a bit of time discussing this with her and the rationale between converting to a Aravind-en-Y gastric bypass.      I went over the surgery in detail with her.  I went over the nature of the operation and some of the potential  consequences of the surgery.  I went over the expected hospital course and discussed laparoscopic versus open surgery, understanding that we will plan on doing this laparoscopically with the possibility of having to convert to an open operation.  I went over some of the risks and complications of the operation including, but not limited to, DVT, pulmonary emboli, pneumonia, postoperative bleeding, wound infection, staple line leak, intra-abdominal sepsis, and possible death.  I also went over some of the potential nutritional concerns such as vitamin B-12, iron, vitamin D, vitamin A, calcium and protein deficiencies.  I will also went over the need for lifelong nutritional surveillance.  The patient understands and wants to proceed with surgery.  We will submit for prior authorization.      Catarino Nathan MD  [facility] Department of Surgery

## 2021-05-27 NOTE — PATIENT INSTRUCTIONS - HE
Discussed converting sleeve gastrectomy to Aravind Y Gastric Bypass. Went over information sheet and the fact that this is revisional surgery with increased risk
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
## 2021-05-27 NOTE — PROGRESS NOTES
Workflow updated.    Tea Clancy RN, CBN  [facility]  P [phone]  F [phone]

## 2021-05-27 NOTE — PROGRESS NOTES
Outpatient Bariatric Medicine Progress Note-Structured Weight Loss   Indication: Medical bariatric therapy to precede bariatric surgery    Surgery:  Revision Sleeve to RYGB    Surgeon: Dr. Nathan    Impression     53 y.o. female with Body mass index is 47.14 kg/m . in the setting of morbid obesity which satisfies the NIH criteria for bariatric surgery.    Comorbidities:  Patient Active Problem List   Diagnosis     GERD (gastroesophageal reflux disease)     Arthritis     Morbid obesity with BMI of 45.0-49.9, adult (H)     Hypertension     Morbid obesity (H)     Knee pain       Plan   Weight will need to be 300# prior to the 2 week pre-operative diet.  Heparin Protocol: standard  CPAP: NA  Actigall: for 6 months post op  Exercise Plan: swimming 3X/wk or more  Contraception Plan: TL  Agrees to take vitamins for Life: yes  Agrees to follow up for life: yes  Stress Management Plan: Prayer  Medication Management: will stop thiazide diuretic at 2 week liquid diet, metformin 2 days before surgery and liraglutide optionally    Past Surgical History        Past Surgical History:   Procedure Laterality Date      SECTION, CLASSIC  1997     GASTRECTOMY LAPAROSCOPIC SLEEVE      Dr. Nathan     PARTIAL HYSTERECTOMY  2007       Family History, Social History   Reviewed as documented. See time and date confirmation.    Interim History/Pre-op needs list    Initial Labs Complete and Reviewed: complete  Dietitian Visits Initiated/cleared: cleared  Weight Change since initial weight: stable  Psychologist visits initiated/cleared: cleared  HCM UTD: UTD  Sugars Well Controlled: yes  Cardiac: no issues  Pulmonary: no issues  Hormone use: NA   NA    Medications and Allergies      Current Outpatient Medications   Medication Sig Dispense Refill     cholecalciferol, vitamin D3, (VITAMIN D3) 5,000 unit Tab Take 1 tablet (5,000 Units total) by mouth daily. 90 tablet prn     cyanocobalamin, vitamin B-12, 1,000 mcg Subl Place 1 tablet  "(1,000 mcg total) under the tongue daily. 90 tablet prn     ergocalciferol (ERGOCALCIFEROL) 50,000 unit capsule Take 50,000 Units by mouth once a week.       hydroCHLOROthiazide (HYDRODIURIL) 25 MG tablet Take 25 mg by mouth daily.       liraglutide (VICTOZA) 0.6 mg/0.1 mL (18 mg/3 mL) PnIj injection Inject 1.8 mg under the skin daily. 27 mL prn     metFORMIN (GLUCOPHAGE) 1000 MG tablet Take 1,000 mg by mouth daily.       omeprazole (PRILOSEC) 20 MG capsule Take 1 capsule (20 mg total) by mouth daily. 90 capsule prn     pen needle, diabetic 32 gauge x 5/32\" Ndle Inject 1 Units under the skin daily. 100 each prn     PRENATAL VITAMIN PLUS LOW IRON 27 mg iron- 1 mg Tab Take 2 tablets by mouth daily.  2     thiamine HCl, vitamin B1, (VITAMIN B-1) 250 MG tablet Take 1 tablet (250 mg total) by mouth daily. 90 tablet prn     triamcinolone (KENALOG) 0.1 % ointment Apply thin layer on effected area       No current facility-administered medications for this visit.      Allergies: Penicillins and Codeine    Habits   Tobacco Use: no  Alcohol Use: no  NSAIDS: no  Caffeine: no  SLEEP: 8 hours  CPAP: NA  PHYSICAL ACTIVITY PATTERNS:  Cardiovascular: swimming 3X/wk  Strength Training: swimming  STRESS MANAGEMENT PATTERNS:   prayer    Dietary History   Meals Per Day: 3  Eating Protein First: yes  Grams of Protein daily: 60+  Typical Snack: string cheese and nuts, yogurt  Water Intake: intentional (water bottle with her today)  Drinking Separate from Meals: yes  Calorie Containing Beverages: weaning off of soda  Portion Control: improved  Chewing Food to Applesauce Consistency: yes  Able to list protein foods: cheese, nuts, greek yogurt, chicken, fish, eggs  Choosing Whole Grains: yes  Meals at Restaurant/Cafeteria/Take Out Per Week: 0  Eating at the Table: yes  TV is Off During Meals: yes    Positive Changes Since Last Visit: see above healthy habits  Struggling With: weight loss, eliminating soda    Knowledgeable in Reading Food " "Labels: yes    Review of Systems     GENERAL/CONSTITUTIONAL:  Fatigue: improved  HEENT:  Dental Pain: no  Trouble Swallowing: no  CARDIOVASCULAR:  Chest pain with exertion: no  PULMONARY:  CPAP Use: NA  Asthma Controlled: NA  GASTROINTESTINAL:  GERD/Heartburn: on PPI  Gallbladder: present  UROLOGIC:  Urinary Symptoms: no  NEUROLOGIC:  Headaches: no  Paresthesias: no  PSYCHIATRIC:  Moods: OK  MUSCULOSKELETAL/RHEUMATOLOGIC  Arthralgias: yes OA  Myalgias: yes LBP  ENDOCRINE:  Monitoring Blood Sugars: no  Sugars Well Controlled: yes  DERMATOLOGIC:  Rashes: no  Physical Exam   Vitals: /69   Pulse 70   Resp 18   Ht 5' 7\" (1.702 m)   Wt (!) 301 lb (136.5 kg)   SpO2 100%   BMI 47.14 kg/m    Body mass index is 47.14 kg/m .  Neck Circumference: 16.5\"  Waist Circumference: 47\"  GENERAL: appears her stated age. Ambulatory.  HEENT: PEERLA, EOMI, teeth adequate, airway 1+   NECK: no carotid bruits, no anterior/supraclavicular lymphadenopathy, thyroid normal   HEART: Rhythm regular, rate regular, no murmur   LUNGS: Clear   ABDOMEN: soft, non-tender, obese,no rashes, surgical scars lap  MUSCULOSKELETAL: no obvious deformities  VASCULAR: palpable peripheral pulses, no  lower extremity edema, no color changes of venous stasis, no ulcerations  SKIN: Rashes: no    Counseling     We discussed the National Weight Control Registry and Healthy Weight Maintenance Strategies.   We discussed ways to optimize her metabolism.  We discussed specific exercise goals and dietary habits conducive to a healthy weight.  We discussed the importance of lifelong vitamin supplementation and the potential medical complications of vitamin non-compliance.  We discussed the importance of restorative sleep and stress management in maintaining a healthy weight.  I reminded her to avoid alcohol for 1 year post-operatively, to avoid NSAIDS for life unless specifically indicated and used with a concomitant PPI, to avoid caffeine in excess, and " explained the importance of lifelong tobacco abstinence.  30 minutes spent with patient >50% in counseling.

## 2021-05-27 NOTE — PROGRESS NOTES
Follow Up Surgical Weight Loss Supervised Diet Evaluation  Assessment:  Pt presents for follow up supervised diet visit with VERA.    This patient is a 53 y.o.  She is being seen today for follow-up nutritional evaluation. Shana Ventura has been unsuccessful with non-surgical weight loss methods and is interested in bariatric surgery. Today we reviewed the patients current eating habits and level of physical activity, and instructed on the changes that are required for successful bariatric outcomes.    Workflow review: Pt has completed labs, went to [facility] support group and has one more follow-up with Flako  Weight goal: At or below initial weight     Pt Active Problem List Diagnosis:     Patient Active Problem List   Diagnosis     GERD (gastroesophageal reflux disease)     Arthritis     Morbid obesity with BMI of 45.0-49.9, adult (H)     Hypertension     Morbid obesity (H)     Knee pain     Pt's Initial Weight: 300 lbs  Weight: (!) 299 lb (135.6 kg)  Weight loss from initial: 1  % Weight loss: 0.33 %    Body mass index is 46.83 kg/m .    +Pt has been very introspective with her eating habits   Progress made since last visit: Pt is drinking enough water daily and eating 3 meals daily with enough protein; She has slowed down her meals and  fluids from meals  +only eating when she is hungry (limited lately)   Concerns: Pt still drinking soda and coffee - states she will eliminate by surgery; she also plans to add more protein to bfast     DM: 102-110; meds in chart - watching what she is eating in the evening     +focus on mindfulness  Diet Recall/Time:   Breakfast: 2eggs with toast OR pancake/waffle with egg (15g)   Am Snack: none  Lunch: string cheese, beef jerky and fruit OR Pro/Veg/CHO (21g)   Pm snack: none  Dinner: Pro/Veg/CHO (21g)   HS Snack: nuts and yogurt     Protein: 60-70g    Typical Snacks or snack times: above    Recommended limiting eating out to no more than 2x/week.  Patient and I reviewed  the importance of eating three consistent meals per day; as well as meal timing to be spaced 4-5 hours apart.  Snack choices: 100-150 calories (1-2x/day if physically hungry), incorporating a fruit/vegetable w/ protein source.    Meal Duration:20 minutes  Encouraged slowing meal times down, 20-30 minutes, chewing to applesauce consistency.     Portion Sizes problematic? No Per patient/diet recall   To aid in proper portion control and slow meal time down discussed consuming meals off smaller plates, use toddler/children utensils and set utensils down after each bite.    Protein, vegetables/fruits, carbohydrates:   The patient and I discussed the importance of including lean/low fat protein at each meal and limiting carbohydrate intake to less than 25% of plate volume.     Vitamins   Post-op vitamin regimen: Multi Vit + iron 2x/day, calcium citrate 500-600 mg 2x/day, 9178-7507 mcg of Sublingual B-12 daily, and 5000 IU Vitamin D3 daily.    Beverages (Type/Oz. per day)  Water: 64oz  Coffee: 1 cup of coffee - will eliminate   Tea: none  Milk: none  Regular soda: 4soda/week  Diet soda: none  Juice: none  Joel-Aid/lemonade/etc: none  Alcohol: none    Discussed the importance of adequate hydration after surgery and the goal of 64+ oz of fluid daily.   The patient understands the importance of avoiding all carbonated, caffeinated and sweetened drinks; and instead choose 64 oz plain water.    Fluid-meal separation:   Pt is working on  fluids 30min before and 30 minutes after meals.  Fluids are  30min before and 30 minutes after meals.    Exercise  Water aerobics and walking weekly     Pt's understands that 30-60 minutes of daily activity is an important part of bariatric surgery success.   Encouraged pt to incorporate upper body strength training exercise, even if its lifting soup cans while watching TV at night, doing pushups/sit-ups, and abdominal work.    PES statement:   1. (NC-3.3.5) Obese, class III,  BMI ?40 related to physical inactivity as evidenced by Infrequent, low-duration and or low intensity physical activity; and Large amounts of sedentary activities; no structured physical activity regimen    Intervention:  Discussion:   1. Reviewed the Keys to Bariatric Success handout.  2. Reviewed lean protein sources and recommended to consume 15-20gm protein at 3 meals daily.  3. Gave pt Check Your Understanding Quiz, and assessed readiness for change.  4. Educated on pre/post-op diet progression, post-op vitamin regimen, gave review of surgery process.  5. Dumping syndrome: choosing foods with less than 5-10gm fat/sugar per serving to avoid dumping syndrome for RNY pts.  6. Reviewed Bariatric plate and food journal homework.    Instructions/Goals:   1. Include 15-20gm protein at each meal.  2. Increase vegetable/fruit intake, by having a vegetable or fruit with each meal daily. Recommended pt to increase vegetable/fruit intake to 4-5 servings daily.  3. Increase fluid intake to 64oz daily: choose plain or calorie/carbonation-free beverages.  4. Incorporate daily structured activity, 30-60 minutes most days of the week  5. Practice plate method: 1/2 plate lean/low fat protein source, vegetable/fruit, <25% of plate complex carbohydrates.  6. Read food labels more consistently: keeping total fat grams <10, total sugar grams <10, fiber >3gm per serving.  7. Separate fluids 30 minutes before/after meal times.  8. Practice eating off of smaller plates/bowls, chewing to applesauce consistency, taking 20-30 minutes to eat in a calm/relaxed environment without distractions of tv/email/cell phone.    Handouts provided:  Keys to Bariatric Success    Monitor/Evaluation:     Pt understands the importance of not gaining any weight from initial recorded weight.    Has realistic expectations for weight loss: Yes  Verbalizes understanding of dietary changes post procedure: Yes  Verbalizes understanding of supplement needs:  Yes  Verbalizes willingness to participate in physical activity: Yes  Motivation for change: average  Client s predicted compliance on a scale of 1 (low) to 10 (high): 8/9  RD s prediction for client success and compliance on a scale of 1 (low) to 10 (high):  8    Pt has made the necessary changes, and is knowledgeable and well-informed of the dietary and physical activity requirements that are necessary for successful bariatric outcomes. This Pt is an appropriate candidate for surgery from a nutrition standpoint at this time. The patient understands that surgery is a tool, and not a cure, and our aftercare program must be followed.    Time In: 8:30a  Time Out: 9:00a    ABN signed: Yes

## 2021-05-28 NOTE — PROGRESS NOTES
Attended support group. Workflow updated.    Chioma Guerrier [facility]  P: [phone]  F: [phone]

## 2021-05-31 NOTE — PROGRESS NOTES
Patient attended group class to review pre-op instructions and dietary plan for upcoming surgery.    Pt will begin her 2 week liquid diet on 8/28/2019 and will do clear liquids the day before surgery.  Pt is scheduled for Revision from LSG to RNY on 9/11/2019 and will follow a 1 week post-op liquid diet.  Pt will f/u with an RD 1 week post-op for further diet advancement.    Educated pt on 2 week pre-op and 1-2 week post-op liquid diets.  Discussed appropriate liquids and demonstrated portions for each of the food groupings during each diet phase.  Reviewed appropriate calories/protein/fluid goals during the 2 week pre-op liquid diet. Educated on correct vitamins/minerals to take after surgery in correct dosage and frequency.  Provided grocery lists and sample menu plans for each diet stage, as well as unflavored protein powder samples.    Discussed admission process and hospital course.  Pharmacy information packet given and explained. Patient was given exercises to work on post-op for maintaining muscle mass and strengthening that was created by [facility].  Bariatric quiz given to pt for review on their own. Discharge instructions, information card and follow up appointments given and reviewed with pt at this time and patient verbalized understanding. She will have her H&P at [facility] on 8/29/2019 and do her pre-op testing at that visit.  Prescriptions for Omeprazole and Actigall sent to the patient's pharmacy to be started after surgery.      Tea Clancy RN, CBN  [facility]  P [phone]  F [phone]

## 2021-05-31 NOTE — PATIENT INSTRUCTIONS - HE
Your surgery is scheduled for 9/11/2019 at 7:30 am.  Please arrive at 5:30 am.    Medication Instructions:    Hydrochlorothiazide    Stop taking 2 weeks before surgery and talk to your primary care provider for an alternative medication. You will likely not go home on this medication after surgery for risk of dehydration.  Please be in touch with the clinic if you notice side effects of being off this medication.    Liraglutide (Brand Name: Victoza)  Refer to instructions from the dietitian on when to take and stop prior to surgery.  See instructions on the  [facility] Bariatric Care: Diabetes Management for the Bariatric Surgery Patient  form.    Metformin (Brand Name: Glucophage)   Ok to cut or crush immediate release tablet. Stop two days prior to surgery.  See instructions on the  [facility] Bariatric Care: Diabetes Management for the Bariatric Surgery Patient  form.    Multivitamin with Iron   If not already taking, start chewable MVI with at least 18mg of iron and 10-15 mg of zinc twice a day at least 2 weeks prior to surgery and resume the day after surgery for life. Do not take the morning of surgery.    Omeprazole (Brand Name: Prilosec)   If not already taking, you will get a prescription to start when you get home from the hospital.  Tablets cannot be cut or crushed.  If a capsule, entire capsule contents may be sprinkled on a spoonful of applesauce and taken immediately without chewing.  If only on a full liquid diet, dump capsule contents into a spoonful of liquid and take without chewing.  May swallow whole again starting 6 weeks after surgery but can try swallowing sooner if having issues with opening into food.  Continue after surgery for at least 3 months.    Thiamine   Ok to cut/crush. Do not take this the morning of surgery.    Triamcinolone cream   Ok to use. You are encouraged to bring to the hospital on the day of surgery if needed.    Ursodiol (Brand name: Actigall)  Start two weeks after  surgery.  Swallow whole with warm water, do not cut, crush, or open capsule up.  This is a medication that will help to prevent gallstones from forming during the first 6 months post-op of rapid weight loss.  Prescription was sent to your pharmacy.     Vitamin B12   If not already taking, start daily sublingual (under the tongue) vitamin B12 1,000 mcg, Nascobal (nasal) vitamin B12 500 mcg every 7 days or monthly vitamin B12 injections at least 2 weeks prior to surgery and resume the day after surgery for life. Do not take the morning of surgery.    Vitamin D 50,000 IU   If you need to continue this after surgery, stop taking this until you start taking your calcium citrate.  You must swallow whole.  Do not take the morning of surgery.    [facility]   Laparoscopic Aravind-en-Y Gastric Bypass, Gastric Sleeve & Single Anastomosis Duodenal Switch  Home Discharge Instructions      Coughing and Deep Breathing   Use your incentive spirometer frequently after you get home. Continued coughing and deep breathing help prevent complications such as fevers and pneumonia. If you are fever free after one week, stop using it, and discard it.    Incision and Dressing   All incisional coverings can get wet so you may continue to shower at home.  If you have Band-Aids, they should come off while in the hospital.  Remove all steri-strips one week from your surgery date unless told otherwise by the surgeon.  If you have gauze covered by a clear dressing, remove 2-3 days after surgery as directed by the surgeon.     Notify the clinic or your surgeon if:  You develop a fever orally of 101.5 or greater, see any unusual bright red or green infection-like drainage; see redness or swelling around the incision; have left shoulder pain or pain that does not go away with your narcotic; increasing anxiety or feeling that something is just  not right;  a persistent rapid heart rate (greater than or equal to 120 beats per minute) lasting  longer than 15 minutes; progressive rapid breathing; increasing shortness of breath; continuous (non-stop) hiccups lasting longer than 15 minutes; persistent nausea; if you are unable to keep liquids down; have frothy vomiting; difficulty swallowing; excessive bloating; swelling, warmth, discoloration or pain in your lower legs; dark, bright red, black, or tarry bowel movements; or anything you are concerned might be an urgent problem or need reassurance about.    Dehydration/Nausea/Vomiting  Vomiting is not normal after surgery and can be caused by drinking with meals, eating large portions, not chewing thoroughly and drinking large sips.  If you continue to have nausea and vomiting despite following our recommendations, call the clinic.  Nausea can be caused by dehydration so make sure you are drinking the suggested amount of fluids.  Symptoms of dehydration include dark colored urine, lack of energy, nausea, dizziness, headache and a bad taste in your mouth.  If you notice any of these symptoms, please don t delay in calling the clinic.  Early identification gives us more options for treatment.    Bowel Movements  Consider that your stools might be loose since you are currently only taking in fluids. Diarrhea may be caused by dumping syndrome if you had Gastric Bypass, so make sure you aren t drinking liquids containing excess sugar.  Otherwise, call the clinic if you have 3 or more diarrhea stools per day. If constipation is a problem, it is ok to use a Dulcolax  rectal suppository, Miralax or Milk of Magnesia . Other helpful ways to avoid constipation include: increasing your fluids; stop taking narcotic medications; and increasing your activity. Adding Benefiber  daily to your liquids once you have had a stool may help keep you regular until there is more natural fiber in your diet. You may also have foul smelling gas.    Risk for Blood Clots  For the next 6 weeks, if you are in any vehicle longer than 30  minutes, stop every 30 minutes, and walk for at least 3 minutes before continuing your journey. Traveling and/or flying are not recommended for 6 weeks.  If leaving the country within the first 3-6 months after surgery, check with your surgeon first.    Activity  Do not lift greater than 20 lbs. for 2 weeks unless told differently by your surgeon. After two weeks, let your body be your guide. You may drive only after you have been completely off of narcotics for a minimum of 24 hours. Continue to shower as you have in the hospital. NO BATHING IN THE BATHTUB, SWIMMING, etc. for 2-4 weeks.  (You need to be sure your incisions are well healed to prevent infection.)    Pain Control  You will go home with a prescription for pain medication. If your pain does not go away with this medication, please notify your surgeon. If the pain requires medication, but not something as strong as the prescription, you may try Tylenol  (acetaminophen) cut  <    inch or crushed.   DO NOT USE PRESCRIBED OR OVER THE COUNTER NONSTEROIDAL ANTI-  INFLAMMATORY MEDICATION LIKE MOTRIN, ADVIL, IBUPROFEN OR ASPIRIN.    Acid Reducer and Gallbladder Medication  It is important to reduce the amount of acid in your new stomach for a couple months after surgery while it is healing.  We will prescribe an acid reducer that you should take daily.  It is important for you to let us know if you have any symptoms of heartburn or reflux.      For those of you who still have a gallbladder, we will also prescribe a medication called Actigall (ursodiol) and we recommend taking it twice a day for 6 months.  It is only effective if you take it twice a day.  You will start taking this two weeks after surgery.    ER Needs  If you need to go to the Emergency Room, we prefer you go to the [facility] ER.  Be sure to inform the ER staff that you are a bariatric surgery patient, and ask them to notify your surgeon that you are there.    Remember to refer to  your bariatric program handbook and keep follow up appointments for long-term success. You will need regular labs to check your nutritional status and it is very important to take ALL your supplements and protein religiously,    For urgent concerns on evenings, weekends & holidays, call the clinic and the message will direct you to the surgeon on call.    [facility] Bariat[facility] Care: [phone]  Bariatric Nurse Line: [phone]

## 2021-05-31 NOTE — PROGRESS NOTES
Orders placed for pre-op testing which pt plans to have done during her pre-op H&P on 8/29/2019 at [facility].  Orders will be given to pt at pre-op class on 8/21/2019 to take to her PCP.    Tea Clancy RN, CBN  [facility] Bariat[facility]  P [phone]  F [phone]

## 2021-06-01 VITALS — BODY MASS INDEX: 45.99 KG/M2 | WEIGHT: 293 LBS | HEIGHT: 67 IN

## 2021-06-01 VITALS — HEIGHT: 67 IN | BODY MASS INDEX: 45.99 KG/M2 | WEIGHT: 293 LBS

## 2021-06-02 VITALS — HEIGHT: 67 IN | WEIGHT: 293 LBS | BODY MASS INDEX: 45.99 KG/M2

## 2021-06-02 VITALS — BODY MASS INDEX: 45.99 KG/M2 | WEIGHT: 293 LBS | HEIGHT: 67 IN

## 2021-06-02 VITALS — BODY MASS INDEX: 45.99 KG/M2 | HEIGHT: 67 IN | WEIGHT: 293 LBS

## 2021-06-03 VITALS — BODY MASS INDEX: 45.83 KG/M2 | HEIGHT: 67 IN | WEIGHT: 292 LBS

## 2021-06-03 VITALS — HEIGHT: 67 IN | WEIGHT: 293 LBS | BODY MASS INDEX: 45.99 KG/M2

## 2021-06-03 VITALS — BODY MASS INDEX: 45.99 KG/M2 | HEIGHT: 67 IN | WEIGHT: 293 LBS

## 2021-06-04 VITALS — HEIGHT: 67 IN | BODY MASS INDEX: 45.67 KG/M2 | WEIGHT: 291 LBS

## 2021-06-05 ENCOUNTER — RECORDS - HEALTHEAST (OUTPATIENT)
Dept: SURGERY | Facility: CLINIC | Age: 56
End: 2021-06-05

## 2021-06-05 VITALS — HEIGHT: 67 IN | BODY MASS INDEX: 45.99 KG/M2 | WEIGHT: 293 LBS

## 2021-06-05 DIAGNOSIS — R10.9 ABDOMINAL PAIN: ICD-10-CM

## 2021-06-09 NOTE — PROGRESS NOTES
"Shana Ventura is a 55 y.o. female who is being evaluated via a billable video visit.      The patient has been notified of following:     \"This video visit will be conducted via a call between you and your physician/provider. We have found that certain health care needs can be provided without the need for an in-person physical exam.  This service lets us provide the care you need with a video conversation.  If a prescription is necessary we can send it directly to your pharmacy.  If lab work is needed we can place an order for that and you can then stop by our lab to have the test done at a later time.    Video visits are billed at different rates depending on your insurance coverage. Please reach out to your insurance provider with any questions.    If during the course of the call the physician/provider feels a video visit is not appropriate, you will not be charged for this service.\"    Patient has given verbal consent to a Video visit? Yes  How would you like to obtain your AVS? AVS Preference: E-Mail (Inform patient AVS not encrypted with this option).  If dropped by the video visit, the video invitation should be sent to: Text to cell phone: [phone]  Will anyone else be joining your video visit? No        Video Start Time: 8:48    Additional provider notes:     Bariatric Follow Up Visit with a History of Previous Bariatric Surgery     Date of visit: 7/21/2020  Physician: Mirtha Combs MD  Primary Care Provider:  Natalia Sanchez PA-C Regbrittny VU Ventura   55 y.o.  female    Date of Surgery: 12/7/2011  Initial Weight: 336#  Initial BMI:   Today's Weight:   Wt Readings from Last 1 Encounters:   07/21/20 (!) 291 lb (132 kg)     Body mass index is 45.58 kg/m .      Assessment and Plan     Assessment: Shana is a 55 y.o. year old female who is 8.5 yrs s/p  Sleeve Gastrectomy with Dr. Venita Saldana [facility] feels as if she had achieved the goals she hoped to accomplish through bariatric surgery and weight " loss.    Encounter Diagnoses   Name Primary?     Difficulty eating Yes     Postoperative malabsorption      Gastroesophageal reflux disease without esophagitis          Current Outpatient Medications:      acetaminophen (TYLENOL) 500 MG tablet, Take 1,000 mg by mouth., Disp: , Rfl:      albuterol (PROAIR HFA;PROVENTIL HFA;VENTOLIN HFA) 90 mcg/actuation inhaler, Inhale 1-2 puffs., Disp: , Rfl:      cyanocobalamin, vitamin B-12, (NASCOBAL) 500 mcg/spray Spry, Apply 500 mcg into alternating nostrils every 7 days., Disp: 4 Bottle, Rfl: 11     ergocalciferol (ERGOCALCIFEROL) 50,000 unit capsule, Take 50,000 Units by mouth once a week., Disp: , Rfl:      estradioL (ESTRACE) 0.01 % (0.1 mg/gram) vaginal cream, Insert 2 g into the vagina., Disp: , Rfl:      generic lancets (ACCU-CHEK FASTCLIX LANCET DRUM), , Disp: , Rfl:      hydroCHLOROthiazide (HYDRODIURIL) 25 MG tablet, Take 25 mg by mouth daily., Disp: , Rfl:      metFORMIN (GLUCOPHAGE) 1000 MG tablet, Take 1,000 mg by mouth 2 (two) times a day with meals.    , Disp: , Rfl:      PNV,calcium 72-iron-folic acid (PRENATAL VITAMIN PLUS LOW IRON) 27 mg iron- 1 mg Tab, , Disp: , Rfl:      thiamine HCl, vitamin B1, (VITAMIN B-1) 250 MG tablet, Take 1 tablet (250 mg total) by mouth daily., Disp: 90 tablet, Rfl: prn     traMADoL (ULTRAM) 50 mg tablet, TAKE ONE TABLET BY MOUTH EVERY 6 HOURS AS NEEDED FOR PAIN, Disp: , Rfl:      liraglutide (VICTOZA) 0.6 mg/0.1 mL (18 mg/3 mL) PnIj injection, Inject 1.8 mg under the skin daily., Disp: 27 mL, Rfl: prn     omeprazole (PRILOSEC) 40 MG capsule, Take 1 capsule (40 mg total) by mouth daily before breakfast., Disp: 90 capsule, Rfl: prn    Plan: REstart PPI. Measure foods. Meet with RD for MNT. Consider endoscopy for evaluation or UGI series. Avoid NSAIDS    Return for Next scheduled follow up. OK to restart victoza if UGI series OK. Otherwise consider phentermine.    Bariatric Surgery Review     Interim History/LifeChanges: problems with  eating. Maintaining a 45# weght loss. GERD, not vomiting. Not taking PPI as it didn't work.    Patient Concerns: nausea with eating,   Appetite (1-10): nausea stops her from eating.  GERD: yes    Medication changes: no PPI,     Vitamin Intake:   B-12   nasal   MVI  daily   Vitamin D  5,000U   Calcium   citrate     Other  no              LABS: ordered    Nausea yes  Vomiting no  Constipation no  Diarrhea no  Rashes no  Hair Loss no  Calf tenderness no  Breathing difficulty no  Reactive Hypoglycemia no  Light Headedness occ   Moods not stable-up and down    12 point ROS as above and otherwise negative      Habits:  Alcohol: no  Tobacco: no  Caffeine occ  NSAIDS at one point was taking ibuprofen  Exercise Routine: walks outside2-3x/wk  3 meals/day yes  Protein first yes  ?grams/day  Water Separate from meals yes  Calorie Containing Beverages occ soda Pepsi  Restaurant eating/wk no  Sleeping OK-knee sometimes wakes her up  Stress low  CPAP: NA  Contraception: TL  DEXA:NA    Social History     Social History     Socioeconomic History     Marital status:      Spouse name: Not on file     Number of children: Not on file     Years of education: Not on file     Highest education level: Not on file   Occupational History     Not on file   Social Needs     Financial resource strain: Not on file     Food insecurity     Worry: Not on file     Inability: Not on file     Transportation needs     Medical: Not on file     Non-medical: Not on file   Tobacco Use     Smoking status: Former Smoker     Smokeless tobacco: Never Used   Substance and Sexual Activity     Alcohol use: Yes     Comment: Special Occ.      Drug use: No     Sexual activity: Yes     Partners: Male     Birth control/protection: Surgical   Lifestyle     Physical activity     Days per week: Not on file     Minutes per session: Not on file     Stress: Not on file   Relationships     Social connections     Talks on phone: Not on file     Gets together: Not on  file     Attends Protestant service: Not on file     Active member of club or organization: Not on file     Attends meetings of clubs or organizations: Not on file     Relationship status: Not on file     Intimate partner violence     Fear of current or ex partner: Not on file     Emotionally abused: Not on file     Physically abused: Not on file     Forced sexual activity: Not on file   Other Topics Concern     Not on file   Social History Narrative    Single, Lee' father of her children living together    Working FT [employer]    2 daughters, one daughter graduated from [employer] and going to [employer]    2 Sons        Past Medical History     Past Medical History:   Diagnosis Date     Arthritis     right knee     Diabetes mellitus (H)      Fibromyalgia, primary      GERD (gastroesophageal reflux disease)     after her sleeve     Knee pain      Morbid obesity (H)      Problem List     Patient Active Problem List   Diagnosis     GERD (gastroesophageal reflux disease)     Arthritis     Morbid obesity with BMI of 45.0-49.9, adult (H)     Hypertension     Morbid obesity (H)     Knee pain     Medications     Current Outpatient Medications   Medication Sig Note     acetaminophen (TYLENOL) 500 MG tablet Take 1,000 mg by mouth.      albuterol (PROAIR HFA;PROVENTIL HFA;VENTOLIN HFA) 90 mcg/actuation inhaler Inhale 1-2 puffs.      cyanocobalamin, vitamin B-12, (NASCOBAL) 500 mcg/spray Spry Apply 500 mcg into alternating nostrils every 7 days.      ergocalciferol (ERGOCALCIFEROL) 50,000 unit capsule Take 50,000 Units by mouth once a week.      estradioL (ESTRACE) 0.01 % (0.1 mg/gram) vaginal cream Insert 2 g into the vagina.      generic lancets (ACCU-CHEK FASTCLIX LANCET DRUM)       hydroCHLOROthiazide (HYDRODIURIL) 25 MG tablet Take 25 mg by mouth daily. 5/10/2018: Received from: [facility] Received Sig: Take 1 Tab by mouth daily.     metFORMIN (GLUCOPHAGE) 1000 MG tablet Take 1,000 mg by mouth 2 (two)  "times a day with meals.        5/10/2018: Received from: [facility] Received Sig: Take 1 Tab by mouth two times a day.     PNV,calcium 72-iron-folic acid (PRENATAL VITAMIN PLUS LOW IRON) 27 mg iron- 1 mg Tab       thiamine HCl, vitamin B1, (VITAMIN B-1) 250 MG tablet Take 1 tablet (250 mg total) by mouth daily.      traMADoL (ULTRAM) 50 mg tablet TAKE ONE TABLET BY MOUTH EVERY 6 HOURS AS NEEDED FOR PAIN      liraglutide (VICTOZA) 0.6 mg/0.1 mL (18 mg/3 mL) PnIj injection Inject 1.8 mg under the skin daily.      omeprazole (PRILOSEC) 40 MG capsule Take 1 capsule (40 mg total) by mouth daily before breakfast.      Surgical History     Past Surgical History  She has a past surgical history that includes Small intestine surgery ();  section, classic (); and Partial hysterectomy ().    Objective-Exam     Constitutional:  Ht 5' 7\" (1.702 m)   Wt (!) 291 lb (132 kg)   BMI 45.58 kg/m    Height: 5' 7\" (1.702 m) (2020  8:00 AM)  Initial Weight: 300 lbs (2020  8:00 AM)  Weight: (!) 291 lb (132 kg) (2020  8:00 AM)  Weight loss from initial: 9 (2020  8:00 AM)  % Weight loss: 3 % (2020  8:00 AM)  BMI (Calculated): 45.6 (2020  8:00 AM)    General:  Pleasant and in no acute distress   Psychiatric: alert and oriented X3, mood and affect normal    Counseling     We reviewed the important post op bariatric recommendations:  -eating 3 meals daily  -eating protein first, getting >60gm protein daily  -eating slowly, chewing food well  -avoiding/limiting calorie containing beverages  -drinking water 15-30 minutes before or after meals  -choosing wheat, not white with breads, crackers, pastas, henrry, bagels, tortillas, rice  -limiting restaurant or cafeteria eating to twice a week or less    We discussed the importance of restorative sleep and stress management in maintaining a healthy weight.  We discussed the National Weight Control Registry healthy weight maintenance strategies and " ways to optimize metabolism.  We discussed the importance of physical activity including cardiovascular and strength training in maintaining a healthier weight.    We discussed the importance of life-long vitamin supplementation and life-long  follow-up.    Shana was reminded that, to avoid marginal ulcers she should avoid tobacco at all, alcohol in excess, caffeine in excess, and NSAIDS (unless indicated for cardioprotection or othewise and opposed by a PPI).    Mirtha Combs MD, FAAFP  [facility].  7/21/2020  8:52 AM            Video-Visit Details    Type of service:  Video Visit    Video End Time (time video stopped): 9:15 AM  Originating Location (pt. Location): Home    Distant Location (provider location):  [facility]     Platform used for Video Visit: Dawna Combs MD

## 2021-06-12 NOTE — TELEPHONE ENCOUNTER
----- Message from Mirtha Comsb MD sent at 7/31/2020  4:59 PM CDT -----  Regarding: f/u labs  A1c, PTH, CMP and D in 3 months. Thnaks!

## 2021-06-14 NOTE — PROGRESS NOTES
Shana Ventura is a 55 y.o. female who is being evaluated via a billable video visit.       How would you like to obtain your AVS? MyChart.  If dropped from the video visit, the video invitation should be resent by: Send to e-mail at: [email or web address]  Will anyone else be joining your video visit? No     Video Start Time: 8:45 am      Post-op Surgical Weight Loss Diet Evaluation     Assessment:  Pt presents for 9 year post-op RD visit, s/p LSG on 12/7/2011 with Dr. Nathan. Today we reviewed current eating habits and level of physical activity, and instructed on the changes that are required for successful bariatric outcomes.    Patient Progress: Patient would like to lose weight to around 250 lb and feel healthier    Pt's Initial Weight: 300 lbs  Weight: (!) 296 lb (134.3 kg)  Weight loss from initial: 4  % Weight loss: 1.33 %      There is no height or weight on file to calculate BMI.    Patient Active Problem List   Diagnosis     GERD (gastroesophageal reflux disease)     Arthritis     Morbid obesity with BMI of 45.0-49.9, adult (H)     Hypertension     Morbid obesity (H)     Knee pain     Diabetes mellitus, type 2 (H)       Diabetes Yes, metformin, trulicity    Vitamins   Multi Vit with Iron: chewables  Calcium Citrate:    B12: yes  D3: yes    Nausea: no  Vomiting: no  Diarrhea: no  Constipation: no  Hair loss:no    Diet Recall/Time:   Breakfast: Breakfast bowl (Eggs, meats, potatoes)  Lunch: PB and J sandwich, microwave popcorn  Dinner: Baked ribs, spaghetti with ground beef and sausage, mixed veggies    Typical Snacks: Sometimes - chocolate    Estimated portion size per meals: ~2 cup/meal, but some meals keeps to 1 cup of food    Fluid-meal separation:  Fluids are  30min before and 30 minutes after meals.    Fluid Intake  Water: she does drink water  Caffeine: 1 cup of coffee  Juice: 1 cup - doesn't   Carbonation: 1 can pepsi or 2 per day, she is trying to eliminate this again    PES  "statement:      (NC-1.4) Altered GI Function related to Alteration in gastrointestinal tract structure and/or function/ Decreased functional length of the GI tract as evidenced by maintained a Weight loss of 40 lbs from initial body weight; sleeve gastrectomy    Intervention    Discussion  1. Discussed Post-Op Nutritional Guidelines for LSG  2. Recommended to consume 15-20gm protein at 3 meals daily, along with protein supplement/\"planned protein containing snack\" of 15-30gm protein, to reach goal of 60-80 gm protein daily.  3. Educated on post-op vitamin regimen: Multi Vit + iron 2x/day, calcium citrate 400-600 mg 2x/day, 7341-6323 mcg of Sublingual B-12 daily, and 5000 IU Vitamin D3 daily (MVI and calcium can be taken at the same time BID)  4. Reviewed lean protein sources  5. Bariatric Plate Method-  including lean/low fat protein at each meal, including a vegetable/fruit, and limiting carbohydrate intake to less than 25% of plate volume.    Instructions  1. Include 15-20gm protein at each meal, along with protein supplement/\"planned protein containing snack\" of 15-30gm protein, to reach goal of 60-80 gm protein daily.  2. Increase fluid intake to 64oz daily: choose plain or calorie/carbonation-free beverages.  3. Incorporate daily structured activity, 30-60 minutes most days of the week  4. Recommended pt to start taking: Multi Vit + iron 2x/day, calcium citrate 400-600 mg 2x/day, 2166-4759 mcg of Sublingual B-12 daily, and 5000 IU Vitamin D3 daily. (MVI and calcium can be taken at the same time)  5. Increase vegetable/fruit intake, by having a vegetable or fruit with each meal daily.  6. Practice plate method: 1/2 plate lean/low fat protein source, vegetable/fruit, <25% of plate complex carbohydrates.  7. Separate fluids 30 minutes before/after meal times.  8. Practice eating off of smaller plates/bowls, chewing to applesauce consistency, taking 20-30 minutes to eat in a calm/relaxed environment without " distractions of tv/email/cell phone.    Handouts provided:  Post-Op Nutritional Guidelines for LSG    Assessment/Plan:    Pt to follow up for yearly post-op visit with bariatrician in December 2021     Video-Visit Details    Type of service:  Video Visit    Video End Time (time video stopped): 9:00 am  Originating Location (pt. Location): Home    Distant Location (provider location):  [facility] [place]     Platform used for Video Visit: Dawna Campos RD

## 2021-06-14 NOTE — PROGRESS NOTES
Shana Ventura is 55 y.o.  female who presents for a billable video visit today.    How would you like to obtain your AVS? MyChart.  If dropped from the video visit, the video invitation should be resent by: Text to cell phone: [phone]  Will anyone else be joining your video visit? No      Video Start Time: 9:28      Bariatric Follow Up Visit with a History of Previous Bariatric Surgery     Date of visit: 1/14/2021  Physician: Mirtha Combs MD  Primary Care Provider:  Natalia Sanchez, YOAV  Shana Ventura   55 y.o.  female    Date of Surgery: 12/7/2011  Initial Weight: 336#  Initial BMI:   Today's Weight:   Wt Readings from Last 1 Encounters:   01/14/21 (!) 296 lb (134.3 kg)     Body mass index is 46.36 kg/m .      Assessment and Plan     Assessment: Shana is a 55 y.o. year old female who is 9 yrs s/p  Sleeve Gastrectomy with Dr. Nathan  Shana Ventura feels as if she had achieved the goals she hoped to accomplish through bariatric surgery and weight loss.    Encounter Diagnoses   Name Primary?     Postoperative malabsorption Yes     Type 2 diabetes mellitus without complication, without long-term current use of insulin (H)      Morbid obesity with BMI of 45.0-49.9, adult (H)          Current Outpatient Medications:      acetaminophen (TYLENOL) 500 MG tablet, Take 1,000 mg by mouth., Disp: , Rfl:      albuterol (PROAIR HFA;PROVENTIL HFA;VENTOLIN HFA) 90 mcg/actuation inhaler, Inhale 1-2 puffs., Disp: , Rfl:      cyanocobalamin, vitamin B-12, (NASCOBAL) 500 mcg/spray Spry, Apply 500 mcg into alternating nostrils every 7 days., Disp: 4 Bottle, Rfl: 11     dulaglutide (TRULICITY) 0.75 mg/0.5 mL PnIj, Inject 0.75 mg under the skin., Disp: , Rfl:      ergocalciferol (ERGOCALCIFEROL) 50,000 unit capsule, Take 50,000 Units by mouth once a week., Disp: , Rfl:      estradioL (ESTRACE) 0.01 % (0.1 mg/gram) vaginal cream, Insert 2 g into the vagina., Disp: , Rfl:      generic lancets (ACCU-CHEK FASTCLIX LANCET  DRUM), , Disp: , Rfl:      hydroCHLOROthiazide (HYDRODIURIL) 25 MG tablet, Take 25 mg by mouth daily., Disp: , Rfl:      metFORMIN (GLUCOPHAGE-XR) 500 MG 24 hr tablet, Take 2,000 mg by mouth., Disp: , Rfl:      omeprazole (PRILOSEC) 40 MG capsule, , Disp: , Rfl:      PNV,calcium 72-iron-folic acid (PRENATAL VITAMIN PLUS LOW IRON) 27 mg iron- 1 mg Tab, , Disp: , Rfl:      thiamine HCl, vitamin B1, (VITAMIN B-1) 250 MG tablet, Take 1 tablet (250 mg total) by mouth daily., Disp: 90 tablet, Rfl: prn     traMADoL (ULTRAM) 50 mg tablet, TAKE ONE TABLET BY MOUTH EVERY 6 HOURS AS NEEDED FOR PAIN, Disp: , Rfl:      phentermine (ADIPEX-P) 37.5 mg tablet, Take 1/2 to 1 tablet in the morning., Disp: 90 tablet, Rfl: 0     prenatal vit no.130-iron-folic (PRENATAL VITAMIN) 27 mg iron- 800 mcg Tab tablet, Take 2 tablets by mouth daily., Disp: 180 tablet, Rfl: prn    Plan:Will order thiamine to be drawn with her A1c upcoming at  clinic. Phentermine [place]. Continue walking as knees allow until getting in the pool.     Return for Next scheduled follow up. Dietitain    Bariatric Surgery Review     Interim History/LifeChanges: Was off of tulicity and metformin for a time. Back on metformin 1000mg daily and trulicity weekly. DOing OK with COVID. Taking her vitamins with consistency. Taking her thiamine. Needs a refill on PNV. UGI showed minimal reflux. She is taking her omeprazole 40mg. Maintaining a 40# weight loss. Her  is supportive. Increasing green veggies     Patient Concerns: would like to move around without pain in her knees  Appetite (1-10): up  GERD: on PPI but not daily    Medication changes: was off of her metformin for a time but is back on     Vitamin Intake:   B-12   nasal   MVI  chewables would like PNV   Vitamin D  5,000U   Calcium        Other  thiamine 250mg daily              LABS: ordered    Nausea no  Vomiting no  Constipation no  Diarrhea no  Rashes no  Hair Loss no  Calf tenderness  no  Breathing difficulty no  Reactive Hypoglycemia no  Light Headedness no   Moods ok    12 point ROS as above and otherwise negative      Habits:  Alcohol: no  Tobacco: no  Caffeine coffee 1c/d  NSAIDS no  Exercise Routine: planning on getting back to the pool  3 meals/day for a couple of weeks now B: eggs with veggies L:meat and carb D:   Protein first yes  50-60 grams/day  Water Separate from meals yes  Calorie Containing Beverages some OJ  Restaurant eating/wk rare  Sleeping OK  Stress managing with prayer and reads   CPAP:   Contraception: TL  DEXA:not indicated d/t sceen    Social History     Social History     Socioeconomic History     Marital status:      Spouse name: Not on file     Number of children: Not on file     Years of education: Not on file     Highest education level: Not on file   Occupational History     Not on file   Social Needs     Financial resource strain: Not on file     Food insecurity     Worry: Not on file     Inability: Not on file     Transportation needs     Medical: Not on file     Non-medical: Not on file   Tobacco Use     Smoking status: Former Smoker     Smokeless tobacco: Never Used   Substance and Sexual Activity     Alcohol use: Yes     Comment: Special Occ.      Drug use: No     Sexual activity: Yes     Partners: Male     Birth control/protection: Surgical   Lifestyle     Physical activity     Days per week: Not on file     Minutes per session: Not on file     Stress: Not on file   Relationships     Social connections     Talks on phone: Not on file     Gets together: Not on file     Attends Advent service: Not on file     Active member of club or organization: Not on file     Attends meetings of clubs or organizations: Not on file     Relationship status: Not on file     Intimate partner violence     Fear of current or ex partner: Not on file     Emotionally abused: Not on file     Physically abused: Not on file     Forced sexual activity: Not on file   Other  Topics Concern     Not on file   Social History Narrative    Single, Lee' father of her children living together    Working FT [id number]    2 daughters, one daughter graduated from [employer] and going to [employer]    2 Sons        Past Medical History     Past Medical History:   Diagnosis Date     Arthritis     right knee     Diabetes mellitus (H)      Fibromyalgia, primary      GERD (gastroesophageal reflux disease)     after her sleeve     Hypertension      Knee pain      Morbid obesity (H)      Problem List     Patient Active Problem List   Diagnosis     GERD (gastroesophageal reflux disease)     Arthritis     Morbid obesity with BMI of 45.0-49.9, adult (H)     Hypertension     Morbid obesity (H)     Knee pain     Diabetes mellitus, type 2 (H)     Medications     Current Outpatient Medications   Medication Sig Note     acetaminophen (TYLENOL) 500 MG tablet Take 1,000 mg by mouth.      albuterol (PROAIR HFA;PROVENTIL HFA;VENTOLIN HFA) 90 mcg/actuation inhaler Inhale 1-2 puffs.      cyanocobalamin, vitamin B-12, (NASCOBAL) 500 mcg/spray Spry Apply 500 mcg into alternating nostrils every 7 days.      dulaglutide (TRULICITY) 0.75 mg/0.5 mL PnIj Inject 0.75 mg under the skin.      ergocalciferol (ERGOCALCIFEROL) 50,000 unit capsule Take 50,000 Units by mouth once a week.      estradioL (ESTRACE) 0.01 % (0.1 mg/gram) vaginal cream Insert 2 g into the vagina.      generic lancets (ACCU-CHEK FASTCLIX LANCET DRUM)       hydroCHLOROthiazide (HYDRODIURIL) 25 MG tablet Take 25 mg by mouth daily. 5/10/2018: Received from: [facility] Received Sig: Take 1 Tab by mouth daily.     metFORMIN (GLUCOPHAGE-XR) 500 MG 24 hr tablet Take 2,000 mg by mouth.      omeprazole (PRILOSEC) 40 MG capsule       PNV,calcium 72-iron-folic acid (PRENATAL VITAMIN PLUS LOW IRON) 27 mg iron- 1 mg Tab       thiamine HCl, vitamin B1, (VITAMIN B-1) 250 MG tablet Take 1 tablet (250 mg total) by mouth daily.      traMADoL (ULTRAM) 50  "mg tablet TAKE ONE TABLET BY MOUTH EVERY 6 HOURS AS NEEDED FOR PAIN      phentermine (ADIPEX-P) 37.5 mg tablet Take 1/2 to 1 tablet in the morning.      prenatal vit no.130-iron-folic (PRENATAL VITAMIN) 27 mg iron- 800 mcg Tab tablet Take 2 tablets by mouth daily.      Surgical History     Past Surgical History  She has a past surgical history that includes Small intestine surgery ();  section, classic (); and Partial hysterectomy ().    Objective-Exam     Constitutional:  Ht 5' 7\" (1.702 m)   Wt (!) 296 lb (134.3 kg)   BMI 46.36 kg/m    Height: 5' 7\" (1.702 m) (2021  9:00 AM)  Initial Weight: 300 lbs (2021  9:00 AM)  Weight: (!) 296 lb (134.3 kg) (2021  9:00 AM)  Weight loss from initial: 4 (2021  9:00 AM)  % Weight loss: 1.33 % (2021  9:00 AM)  BMI (Calculated): 46.3 (2021  9:00 AM)    General:  Pleasant and in no acute distress   Psychiatric: alert and oriented X3, mood and affect normal    Counseling     We reviewed the important post op bariatric recommendations:  -eating 3 meals daily  -eating protein first, getting >60gm protein daily  -eating slowly, chewing food well  -avoiding/limiting calorie containing beverages  -drinking water 15-30 minutes before or after meals  -choosing wheat, not white with breads, crackers, pastas, henrry, bagels, tortillas, rice  -limiting restaurant or cafeteria eating to twice a week or less    We discussed the importance of restorative sleep and stress management in maintaining a healthy weight.  We discussed the National Weight Control Registry healthy weight maintenance strategies and ways to optimize metabolism.  We discussed the importance of physical activity including cardiovascular and strength training in maintaining a healthier weight.    We discussed the importance of life-long vitamin supplementation and life-long  follow-up.    Shana was reminded that, to avoid marginal ulcers she should avoid tobacco at all, " alcohol in excess, caffeine in excess, and NSAIDS (unless indicated for cardioprotection or othewise and opposed by a PPI).    Mirtha Combs MD, [facility].  1/14/2021  9:31 AM            Video-Visit Details    Type of service:  Video Visit    Video End Time (time video stopped): 10:00 AM  Originating Location (pt. Location): Home    Distant Location (provider location):  [facility] [place]     Platform used for Video Visit: [employer]

## 2021-06-15 NOTE — PROGRESS NOTES
Shana Ventura is a 55 y.o. female who is being evaluated via a billable video visit.       How would you like to obtain your AVS? MyChart.  If dropped from the video visit, the video invitation should be resent by: Send to e-mail at: [email or web address]  Will anyone else be joining your video visit? No     Video Start Time: 9:00 AM      Post-op Surgical Weight Loss Diet Evaluation     Assessment:  Pt presents for 9 year post-op RD visit, s/p LSG on 12/7/2011 with Dr. Nathan. Today we reviewed current eating habits and level of physical activity, and instructed on the changes that are required for successful bariatric outcomes.    Patient Progress:   Patient would like to lose weight to around 250 lb and feel healthier.   1. She is going to continue her current nutrition changes of including more nutrient dense food choices and cutting back intake of carbs and soda - met  2. Continue with exercise routine: Swimming and walking - met    Pt's No data recorded    There is no height or weight on file to calculate BMI.    Patient Active Problem List   Diagnosis     GERD (gastroesophageal reflux disease)     Arthritis     Morbid obesity with BMI of 45.0-49.9, adult (H)     Hypertension     Morbid obesity (H)     Knee pain     Diabetes mellitus, type 2 (H)       Diabetes Yes    Diet Recall/Time:   Breakfast: Slim Fast shake  Lunch: baked tilapia with veggies  Dinner: Burger with lettuce and tomatoes (only had bottom of bread), Fried cauliflower, few sweet potatoe fries - dinner out for daughter's bday    She has been adding in more veggies/salads and fruits, limiting bread carbs    Fluid Intake  Water: Bought a gallon water bottle  Caffeine: Some days, about 4x per week  Carbonation: She has limited her intake; when she does have a can she doesn't always finish it all    Exercise: She has been going to the pool, but is having trouble getting in to pool. She is trying to incorporate walking, but this bothers her  "knees.      PES statement:      (NC-1.4) Altered GI Function related to Alteration in gastrointestinal tract structure and/or function/ Decreased functional length of the GI tract as evidenced by Weight loss of 40 lbs from initial body weight; sleeve gastrectomy  Goals:     1. She is going to continue her current nutrition changes of including more nutrient dense food choices and cutting back intake of carbs and soda.  2. Continue with exercise routine: Swimming and walking    Intervention    Discussion  1. Reviewed patient's progress with nutrition and exercise goals.    Instructions  1. Include 15-20gm protein at each meal, along with protein supplement/\"planned protein containing snack\" of 15-30gm protein, to reach goal of 60-80 gm protein daily.  2. Increase fluid intake to 64oz daily: choose plain or calorie/carbonation-free beverages.  3. Incorporate daily structured activity, 30-60 minutes most days of the week  4. Recommended pt to start taking: Multi Vit + iron 2x/day, calcium citrate 400-600 mg 2x/day, 3743-0772 mcg of Sublingual B-12 daily, and 5000 IU Vitamin D3 daily. (MVI and calcium can be taken at the same time)  5. Read food labels more consistently: keeping total fat grams <10, total sugar grams <10, fiber >3gm per serving.  6. Increase vegetable/fruit intake, by having a vegetable or fruit with each meal daily.  7. Practice plate method: 1/2 plate lean/low fat protein source, vegetable/fruit, <25% of plate complex carbohydrates.  8. Separate fluids 30 minutes before/after meal times.  9. Practice eating off of smaller plates/bowls, chewing to applesauce consistency, taking 20-30 minutes to eat in a calm/relaxed environment without distractions of tv/email/cell phone.      Assessment/Plan:    Pt to follow up in 1 month(s) with dietitian    Video-Visit Details    Type of service:  Video Visit    Video End Time (time video stopped): 9:15 am  Originating Location (pt. Location): Home    Distant " Location (provider location):  [facility]     Platform used for Video Visit: Dawna Campos RD

## 2021-06-15 NOTE — PROGRESS NOTES
Shana Ventura is a 55 y.o. female who is being evaluated via a billable video visit.       How would you like to obtain your AVS? MyChart.  If dropped from the video visit, the video invitation should be resent by: Send to e-mail at: [email or web address]  Will anyone else be joining your video visit? No     Video Start Time: 10:28 am      Post-op Surgical Weight Loss Diet Evaluation     Assessment:  Pt presents for 9 year post-op RD visit, s/p LSG on 12/7/2011 with Dr. Nathan. Today we reviewed current eating habits and level of physical activity, and instructed on the changes that are required for successful bariatric outcomes.     Patient Progress: Patient would like to lose weight to around 250 lb and feel healthier    Pt's Initial Weight: 300 lbs  Weight: (!) 296 lb (134.3 kg)  Weight loss from initial: 4  % Weight loss: 1.33 %      There is no height or weight on file to calculate BMI.    Patient Active Problem List   Diagnosis     GERD (gastroesophageal reflux disease)     Arthritis     Morbid obesity with BMI of 45.0-49.9, adult (H)     Hypertension     Morbid obesity (H)     Knee pain     Diabetes mellitus, type 2 (H)       Diabetes Yes    Diet Recall/Time:   Breakfast: Atkins shake (High protein and fiber drink)  Snack: apple  Lunch: Atkins shake (high protein and fiber drink)  Snack: plum, with a few nuts  Dinner: Long grain brown rice and ground beef with green string beans    She has cut back her bread intake and increased her veggies. She has changed her snacks from chocolate to peanuts. She has switched to whole grains like whole wheat bread and brown rice. She is working on decreasing her regular soda intake and drinking more water.    Fluid-meal separation:  Fluids are  30min before and 30 minutes after meals.    Fluid Intake  Water: 24 oz bottle (2-2.5 of these per day)  Caffeine: she has but back her coffee to 8-10 oz per day  Carbonation: She has cut back on sodas; she has not  "had regular soda for 2 days and she wants to completely stick    Exercise: She has been doing swimming, but this has been difficult since the pool capacity is limited.    She may start walking with  around the track at the gym.     PES statement:      (NC-1.4) Altered GI Function related to Alteration in gastrointestinal tract structure and/or function/ Decreased functional length of the GI tract as evidenced by maintained a Weight loss of 40 lbs from initial body weight; sleeve gastrectomy    Intervention    Discussion  1. Recommended to consume 15-20gm protein at 3 meals daily, along with protein supplement/\"planned protein containing snack\" of 15-30gm protein, to reach goal of 60-80 gm protein daily.  2. Educated on post-op vitamin regimen: Multi Vit + iron 2x/day, calcium citrate 400-600 mg 2x/day, 7915-5753 mcg of Sublingual B-12 daily, and 5000 IU Vitamin D3 daily (MVI and calcium can be taken at the same time BID)  3. Reviewed lean protein sources  4. Bariatric Plate Method-  including lean/low fat protein at each meal, including a vegetable/fruit, and limiting carbohydrate intake to less than 25% of plate volume.    Instructions  1. Include 15-20gm protein at each meal, along with protein supplement/\"planned protein containing snack\" of 15-30gm protein, to reach goal of 60-80 gm protein daily.  2. Increase fluid intake to 64oz daily: choose plain or calorie/carbonation-free beverages.  3. Incorporate daily structured activity, 30-60 minutes most days of the week  4. Recommended pt to start taking: Multi Vit + iron 2x/day, calcium citrate 400-600 mg 2x/day, 7796-5564 mcg of Sublingual B-12 daily, and 5000 IU Vitamin D3 daily. (MVI and calcium can be taken at the same time)  5. Read food labels more consistently: keeping total fat grams <10, total sugar grams <10, fiber >3gm per serving.  6. Increase vegetable/fruit intake, by having a vegetable or fruit with each meal daily.  7. Practice plate method: " 1/2 plate lean/low fat protein source, vegetable/fruit, <25% of plate complex carbohydrates.  8. Separate fluids 30 minutes before/after meal times.  9. Practice eating off of smaller plates/bowls, chewing to applesauce consistency, taking 20-30 minutes to eat in a calm/relaxed environment without distractions of tv/email/cell phone.    Goals:    1. She is going to continue her current nutrition changes of including more nutrient dense food choices and cutting back intake of carbs and soda.  2. Continue with exercise routine: Swimming and walking    Assessment/Plan:    Pt to follow up in 1 month with dietitian.    Video-Visit Details    Type of service:  Video Visit    Video End Time (time video stopped): 10:47 AM  Originating Location (pt. Location): Home    Distant Location (provider location):  [facility] [place]     Platform used for Video Visit: Dawna Campos RD

## 2021-06-16 PROBLEM — I10 HYPERTENSION: Status: ACTIVE | Noted: 2018-05-10

## 2021-06-16 PROBLEM — E11.9 DIABETES MELLITUS, TYPE 2 (H): Status: ACTIVE | Noted: 2021-01-14

## 2021-06-16 PROBLEM — E66.01 MORBID OBESITY WITH BMI OF 45.0-49.9, ADULT (H): Status: ACTIVE | Noted: 2018-05-10

## 2021-06-17 NOTE — PROGRESS NOTES
Bariatric Follow Up Visit with a History of Previous Bariatric Surgery     Date of visit: 5/10/2018  Physician: Mirtha Combs MD  Primary Care Provider:  Natalia Sanchez PA-C  Shana Ventura   52 y.o.  female    Date of Surgery: 12/7/2011  Initial Weight: 336# was her high  Initial BMI: >50  Today's Weight:   Wt Readings from Last 1 Encounters:   05/10/18 (!) 299 lb (135.6 kg)     Body mass index is 46.83 kg/(m^2).      Assessment and Plan     Assessment: Shana is a 52 y.o. year old female who is 6.5 yrs s/p  Sleeve Gastrectomy with Dr. Nathan. She was last seen her in 2014 for f/u and weight regain. She was 407# at that visit.  Shana Ventura feels as if she has not achieved the goals she hoped to accomplish through bariatric surgery and weight loss. She didn't lose the weight she thought she would, became discouraged and has not been in for 4yrs.   She is struggling. She is wondering about a revision. She has been consistent with her vitamin D and MVI.      Encounter Diagnoses   Name Primary?     Postoperative malabsorption Yes     GERD (gastroesophageal reflux disease) (K21.9)      Morbid obesity with BMI of 45.0-49.9, adult      Hypertension      Arthritis      Morbid obesity      Knee pain      Patient takes NSAID (non-steroid anti-inflammatory drug)          Current Outpatient Prescriptions:      ergocalciferol (ERGOCALCIFEROL) 50,000 unit capsule, Take 50,000 Units by mouth once a week., Disp: , Rfl:      hydroCHLOROthiazide (HYDRODIURIL) 25 MG tablet, Take 25 mg by mouth daily., Disp: , Rfl:      metFORMIN (GLUCOPHAGE) 1000 MG tablet, Take 1,000 mg by mouth daily., Disp: , Rfl:      multivitamin (ONE A DAY) per tablet, Take 1 tablet by mouth daily., Disp: , Rfl:      omeprazole (PRILOSEC) 20 MG capsule, Take 1 capsule (20 mg total) by mouth daily., Disp: 90 capsule, Rfl: prn    Plan: Annual labs today. Dietitian visit. Back on track packet. Discussed GLP-1 agonists, januvia, weight loss medications as  options to help bring her sugars down and weight loss.   She will take a walk a day after dinner until she gets back from [place] then go back to the [facility] and implement other changes.  She is intent on discontinuing soda all together. Encouraged 3 meals, protein first approach. She would like to avoid medications if possible. Omeprazole for NSAID use.    Return for Next scheduled follow up.    Bariatric Surgery Review     Interim History/LifeChanges: She has been struggling to keep her weight down. She is wondering about a revision. Sugars are creeping up. She is on metformin again. She is skipping meals and grazing.   Her boyfriend and daughter also want to lose weight. She takes ibuprofen for her knee pain daily.    Patient Concerns: weight  Appetite (1-10): poor to starving  GERD: yes -recently    Medication changes: she is off of several former medications, taking vitamins and HCTZ and metformin    Vitamin Intake:   B-12   none   MVI  PNV   Vitamin D  50,000 weekly   Calcium        Other                LABS: ordered and reviewed most recent labs in Care everywhere. Most recent A1c 7.5    Nausea no  Vomiting no  Constipation yes-managed  Diarrhea no  Rashes no  Hair Loss no  Calf tenderness no  Breathing difficulty no  Reactive Hypoglycemia yes-feels nauseated  Light Headedness no   Moods OK    12 point ROS as above and otherwise negative      Habits:  Alcohol: no  Tobacco: no  Caffeine 1c/d  NSAIDS ibuprofen daily  Exercise Routine: walking 30 minutes walking in the neighborhood. Has done spinning, tabata, belongs to the [facility]. Signed up for water Tabata and water [place]  3 meals/day no-one and grazing  Protein first yes  ?grams/day  Water Separate from meals no  Calorie Containing Beverages weaning off of pop-was drinking pop for the past week  Restaurant eating/wk 0-1  Sleeping 8-9  Stress low  CPAP: no  Contraception: hyst    Social History     Social History     Social History     Marital status:       Spouse name: N/A     Number of children: N/A     Years of education: N/A     Occupational History     Not on file.     Social History Main Topics     Smoking status: Former Smoker     Smokeless tobacco: Never Used     Alcohol use No     Drug use: No     Sexual activity: Yes     Partners: Male     Birth control/ protection: Surgical     Other Topics Concern     Not on file     Social History Narrative    Single, Lee' father of her children living together    Working [employer]    2 daughters, one daughter graduated from [employer] and going to [employer]    2 Sons        Past Medical History     Past Medical History:   Diagnosis Date     Arthritis     right knee     Diabetes mellitus      Fibromyalgia, primary      GERD (gastroesophageal reflux disease)     after her sleeve     Knee pain      Morbid obesity      Problem List     Patient Active Problem List   Diagnosis     GERD (gastroesophageal reflux disease)     Arthritis     Morbid obesity with BMI of 45.0-49.9, adult     Hypertension     Morbid obesity     Knee pain     Medications     Current Outpatient Prescriptions   Medication Sig Note     ergocalciferol (ERGOCALCIFEROL) 50,000 unit capsule Take 50,000 Units by mouth once a week.      hydroCHLOROthiazide (HYDRODIURIL) 25 MG tablet Take 25 mg by mouth daily. 5/10/2018: Received from: [facility] Received Sig: Take 1 Tab by mouth daily.     metFORMIN (GLUCOPHAGE) 1000 MG tablet Take 1,000 mg by mouth daily. 5/10/2018: Received from: [facility] Received Sig: Take 1 Tab by mouth two times a day.     multivitamin (ONE A DAY) per tablet Take 1 tablet by mouth daily. 5/10/2018: Received from: [facility] Received Sig: Take 1 tablet by mouth daily. Multivitamin Dose: 1 tablet     omeprazole (PRILOSEC) 20 MG capsule Take 1 capsule (20 mg total) by mouth daily.      Surgical History     Past Surgical History  She has a past surgical history that includes Small intestine surgery ();   "section, classic (1997); and Partial hysterectomy (2007).    Objective-Exam     Constitutional:  /77  Pulse 71  Resp 18  Ht 5' 7\" (1.702 m)  Wt (!) 299 lb (135.6 kg)  SpO2 100%  Breastfeeding? No  BMI 46.83 kg/m2  Height: 5' 7\" (1.702 m) (5/10/2018 11:43 AM)  Weight: 299 lb (135.6 kg) (5/10/2018 11:43 AM)  BMI (Calculated): 46.8 (5/10/2018 11:43 AM)  SpO2: 100 % (5/10/2018 11:43 AM)  General:  Pleasant and in no acute distress   Eyes:  EOMI  ENT:  Airway 1+  Moist mucous membranes  Neck:  Supple, No LAD, No thyromegaly, No carotid bruits appreciated  Respiratory: Normal respiratory effort, no cough, wheezes or crackles  CV:  Regular rate and Rhythm,1-2/6 murmurs, pulses 2+, no calf tenderness, trace bilateral LE edema  Gastrointestinal: Abdomen NT/ND, BS+  Musculoskeletal: muscle mass WNL  Skin: color no pallor hair full, incisions nicely healed  Neurological: No tremor, normal gait  Psychiatric: alert and oriented X3, mood and affect normal    Counseling     We reviewed the important post op bariatric recommendations:  -eating 3 meals daily  -eating protein first, getting >60gm protein daily  -eating slowly, chewing food well  -avoiding/limiting calorie containing beverages  -drinking water 15-30 minutes before or after meals  -choosing wheat, not white with breads, crackers, pastas, henrry, bagels, tortillas, rice  -limiting restaurant or cafeteria eating to twice a week or less    We discussed the importance of restorative sleep and stress management in maintaining a healthy weight.  We discussed the National Weight Control Registry healthy weight maintenance strategies and ways to optimize metabolism.  We discussed the importance of physical activity including cardiovascular and strength training in maintaining a healthier weight.    We discussed the importance of life-long vitamin supplementation and life-long  follow-up.    Shana was reminded that, to avoid marginal ulcers she should avoid tobacco " at all, alcohol in excess, caffeine in excess, and NSAIDS (unless indicated for cardioprotection or othewise and opposed by a PPI).    Mirtha Combs MD, [facility].  5/10/2018  12:08 PM      No images are attached to the encounter.   60 minutes spent with patient. >50% in counseling and coordination of care.

## 2021-06-20 NOTE — PROGRESS NOTES
Bariatric Follow Up Visit with a History of Previous Bariatric Surgery     Date of visit: 8/24/2018  Physician: Mirtha Combs MD  Primary Care Provider:  YOAV Ravi   53 y.o.  female    Date of Surgery: 12/7/2011  Initial Weight: 336#  Initial BMI:   Today's Weight:   Wt Readings from Last 1 Encounters:   08/23/18 (!) 299 lb (135.6 kg)     Body mass index is 46.83 kg/(m^2).      Assessment and Plan     Assessment: Shana is a 53 y.o. year old female who is almost 7 yrs s/p  Sleeve Gastrectomy with Dr. Venita Ventura feels as if she has not achieved the goals she hoped to accomplish through bariatric surgery and weight loss.  She has not been taking B-12 for as long as she can remember. She has arthritis and needs to lose weight.      Encounter Diagnoses   Name Primary?     Postoperative malabsorption Yes     Diabetes mellitus, type 2 (H)      Morbid obesity with BMI of 45.0-49.9, adult (H)          Current Outpatient Prescriptions:      cholecalciferol, vitamin D3, (VITAMIN D3) 5,000 unit Tab, Take 1 tablet (5,000 Units total) by mouth daily., Disp: 90 tablet, Rfl: prn     ergocalciferol (ERGOCALCIFEROL) 50,000 unit capsule, Take 50,000 Units by mouth once a week., Disp: , Rfl:      hydroCHLOROthiazide (HYDRODIURIL) 25 MG tablet, Take 25 mg by mouth daily., Disp: , Rfl:      metFORMIN (GLUCOPHAGE) 1000 MG tablet, Take 1,000 mg by mouth daily., Disp: , Rfl:      pediatric multivit-iron-min Chew, Chew 1 tablet 2 (two) times a day., Disp: 180 each, Rfl: prn     thiamine HCl, vitamin B1, (VITAMIN B-1) 250 MG tablet, Take 1 tablet (250 mg total) by mouth daily., Disp: 90 tablet, Rfl: prn     cyanocobalamin, vitamin B-12, 1,000 mcg Subl, Place 1 tablet (1,000 mcg total) under the tongue daily., Disp: 90 tablet, Rfl: prn     exenatide microspheres (BYDUREON) 2 mg/0.65 mL PnIj extended release injection, Inject 0.65 mL (2 mg total) under the skin every 7 days., Disp: 12 each, Rfl:  "prn     pen needle, diabetic 32 gauge x 5/32\" Ndle, Inject 1 Units under the skin daily., Disp: 100 each, Rfl: prn    Plan: increase iron to decrease cravings. Double PNV to 2 daily. Add B-12. Note to pharmacy if no coverage, alert patient so she can buy it over the counter.   Stop the OJ. Take a walk a day. Dietitian visit will be helpful. Measuring meals will be helpful. Liraglutide for sugars. I think with getting back to work the steps and structure will help.    Return for Next scheduled follow up. With the dietitian.    Bariatric Surgery Review     Interim History/LifeChanges: High was 336# low was 240#. She has arthritis. She needs to lose weight. She does not eat anything. I feel full when I eat a little. I feel sick if I eat too much. I feel uncomfortable.  Wakes up with headaches. Tired.     Patient Concerns: weight  Appetite (1-10): not hungry or very hungry  GERD: none    Medication changes: no changes    Vitamin Intake:   B-12   no   MVI  1 daily-PNV   Vitamin D  50K U weekly   Calcium        Other  thiamine              LABS: ordered  Needs thiamine up  Nausea no  Vomiting no  Constipation no  Diarrhea no  Rashes no  Hair Loss no  Calf tenderness no  Breathing difficulty no  Reactive Hypoglycemia no  Light Headedness no   Moods highs and lows-very tired.     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     Social History Main Topics     Smoking status: Former Smoker     Smokeless tobacco: Never Used     Alcohol use No     Drug use: No     Sexual activity: Yes     Partners: Male     Birth control/ protection: Surgical     Other Topics Concern     Not on file     Social History Narrative    Single, Lee' father of her children living together    Working FT [email or web address]    2 daughters, one daughter graduated from [employer] and going to [employer]    2 Sons        Past Medical History     Past Medical History:   Diagnosis Date     Arthritis     right knee     Diabetes mellitus (H)      Fibromyalgia, primary      GERD (gastroesophageal reflux disease)     after her sleeve     Knee pain      Morbid obesity (H)      Problem List     Patient Active Problem List   Diagnosis     GERD (gastroesophageal reflux disease)     Arthritis     Morbid obesity with BMI of 45.0-49.9, adult (H)     Hypertension     Morbid obesity (H)     Knee pain     Medications     Current Outpatient Prescriptions   Medication Sig Note     cholecalciferol, vitamin D3, (VITAMIN D3) 5,000 unit Tab Take 1 tablet (5,000 Units total) by mouth daily.      ergocalciferol (ERGOCALCIFEROL) 50,000 unit capsule Take 50,000 Units by mouth once a week.      hydroCHLOROthiazide (HYDRODIURIL) 25 MG tablet Take 25 mg by mouth daily. 5/10/2018: Received from: [facility] Received Sig: Take 1 Tab by mouth daily.     metFORMIN (GLUCOPHAGE) 1000 MG tablet Take 1,000 mg by mouth daily. 5/10/2018: Received from: [facility] Received Sig: Take 1 Tab by mouth two times a day.     pediatric multivit-iron-min Chew Chew 1 tablet 2 (two) times a day.      thiamine HCl, vitamin B1, (VITAMIN B-1) 250 MG tablet Take 1 tablet (250 mg total) by mouth daily.      cyanocobalamin, vitamin B-12, 1,000 mcg Subl Place 1 tablet (1,000 mcg total) under the tongue daily.      exenatide  "microspheres (BYDUREON) 2 mg/0.65 mL PnIj extended release injection Inject 0.65 mL (2 mg total) under the skin every 7 days.      pen needle, diabetic 32 gauge x \" Ndle Inject 1 Units under the skin daily.      Surgical History     Past Surgical History  She has a past surgical history that includes Small intestine surgery ();  section, classic (); and Partial hysterectomy ().    Objective-Exam     Constitutional:  /84  Pulse (!) 59  Resp 18  Ht 5' 7\" (1.702 m)  Wt (!) 299 lb (135.6 kg)  SpO2 100%  BMI 46.83 kg/m2  Height: 5' 7\" (1.702 m) (2018 10:17 AM)  Weight: 299 lb (135.6 kg) (2018 10:17 AM)  BMI (Calculated): 46.8 (2018 10:17 AM)  SpO2: 100 % (2018 10:17 AM)  General:  Pleasant and in no acute distress   Eyes:  EOMI  ENT:  Airway 1+  Moist mucous membranes  Neck:  Supple, No LAD, No thyromegaly, No carotid bruits appreciated  Respiratory: Normal respiratory effort, no cough, wheezes or crackles  CV:  Regular rate and Rhythm,NO murmurs, pulses 1-2, no calf tenderness, no LE edema  Gastrointestinal: Abdomen NT/ND, BS+  Musculoskeletal: muscle mass WNL  Skin: hair pulled back, incisions nicely healed  Neurological: No tremor, normal gait  Psychiatric: alert and oriented X3, mood and affect normal    Counseling     We reviewed the important post op bariatric recommendations:  -eating 3 meals daily  -eating protein first, getting >60gm protein daily  -eating slowly, chewing food well  -avoiding/limiting calorie containing beverages  -drinking water 15-30 minutes before or after meals  -choosing wheat, not white with breads, crackers, pastas, henrry, bagels, tortillas, rice  -limiting restaurant or cafeteria eating to twice a week or less    We discussed the importance of restorative sleep and stress management in maintaining a healthy weight.  We discussed the National Weight Control Registry healthy weight maintenance strategies and ways to optimize " metabolism.  We discussed the importance of physical activity including cardiovascular and strength training in maintaining a healthier weight.    We discussed the importance of life-long vitamin supplementation and life-long  follow-up.    Shana was reminded that, to avoid marginal ulcers she should avoid tobacco at all, alcohol in excess, caffeine in excess, and NSAIDS (unless indicated for cardioprotection or othewise and opposed by a PPI).    Mirtha Combs MD, [facility].  8/24/2018  10:45 AM      No images are attached to the encounter.   45 minutes spent with patient. >50% in counseling and coordination of care.

## 2021-06-23 NOTE — TELEPHONE ENCOUNTER
I spoke with Shana this afternoon about her desire to have a revision from her sleeve with Dr. Nathan.  She has [place] for her insurance and will need to follow like she did the first time.  6 months of SWL.  She does have 3  Visits since May of 2018 that could count.  I have set up her appointments with Shane Arriola and Dr. Nathan.  She understands the process and wishes to proceed.

## 2021-06-23 NOTE — PROGRESS NOTES
Bariatric Follow Up Visit with a History of Previous Bariatric Surgery     Date of visit: 2/5/2019  Physician: Mirtha Combs MD  Primary Care Provider:  Natalia Sanchez, YOAV Ventura   53 y.o.  female    Date of Surgery: 12/7/2011  Initial Weight: 336#  Initial BMI:   Today's Weight:   Wt Readings from Last 1 Encounters:   02/05/19 (!) 300 lb (136.1 kg)     Body mass index is 46.99 kg/m .      Assessment and Plan     Assessment: Shana is a 53 y.o. year old female who is 7 yrs s/p  Sleeve Gastrectomy with Dr. Venita Ventura feels as if she has not achieved the goals she hoped to accomplish through bariatric surgery and weight loss.  She would like a revision to a RYGB. She has had GERD. She regained all but 36# of her weight.  Encounter Diagnoses   Name Primary?     Postoperative malabsorption Yes     Morbid obesity with BMI of 45.0-49.9, adult (H)      Type 2 diabetes mellitus without complication, without long-term current use of insulin (H)      Gastroesophageal reflux disease without esophagitis          Current Outpatient Medications:      cholecalciferol, vitamin D3, (VITAMIN D3) 5,000 unit Tab, Take 1 tablet (5,000 Units total) by mouth daily., Disp: 90 tablet, Rfl: prn     cyanocobalamin, vitamin B-12, 1,000 mcg Subl, Place 1 tablet (1,000 mcg total) under the tongue daily., Disp: 90 tablet, Rfl: prn     ergocalciferol (ERGOCALCIFEROL) 50,000 unit capsule, Take 50,000 Units by mouth once a week., Disp: , Rfl:      hydroCHLOROthiazide (HYDRODIURIL) 25 MG tablet, Take 25 mg by mouth daily., Disp: , Rfl:      metFORMIN (GLUCOPHAGE) 1000 MG tablet, Take 1,000 mg by mouth daily., Disp: , Rfl:      PRENATAL VITAMIN PLUS LOW IRON 27 mg iron- 1 mg Tab, Take 2 tablets by mouth daily., Disp: , Rfl: 2     thiamine HCl, vitamin B1, (VITAMIN B-1) 250 MG tablet, Take 1 tablet (250 mg total) by mouth daily., Disp: 90 tablet, Rfl: prn     triamcinolone (KENALOG) 0.1 % ointment, Apply thin layer on  "effected area, Disp: , Rfl:      liraglutide (VICTOZA) 0.6 mg/0.1 mL (18 mg/3 mL) PnIj injection, Inject 1.8 mg under the skin daily., Disp: 27 mL, Rfl: prn     omeprazole (PRILOSEC) 20 MG capsule, Take 1 capsule (20 mg total) by mouth daily., Disp: 90 capsule, Rfl: prn     pen needle, diabetic 32 gauge x \" Ndle, Inject 1 Units under the skin daily., Disp: 100 each, Rfl: prn    Plan: GLP-1 RA will bring A1c and weight down, phentermine is an option if GLP-1RA not covered. Pool. Dietitian. Decrease glucophage to 2,000mg daily (self increased to 3,000mg for weight loss), annual labs. Prilosec for GERD. Will ask Celia if revision is a covered benefit.    No Follow-up on file.    Bariatric Surgery Review     Interim History/LifeChanges: She lost to 240#. In the interim her father and 2 sisters dies (cancer, lung cancer and MI). She has terrible GERD.  She would like a revision to a RYGB. Asks about re-sleeve. She has been taking her vitamins with consistency. She can't exercise d/t her OA knee.  She needs a knee replacement. Talks about water exercises. She would like to learn how to swim. She is done with school. Kids are doing well.  She has her degree in Social work. Plans online Masters accelerated online. Her mother is 87 yo and lives in [place]. She had a cardiac work up after her sister  and \"everything looked good.\"    Patient Concerns: weight regain  Appetite (1-10): down with GERD otherwise very hungry  GERD: yes    Medication changes: none    Vitamin Intake:   B-12   SL   MVI  PNV 2 daily   Vitamin D  5,000   Calcium   calcium in diet     Other  thiamine              LABS: \"Reviewed  Labs in May look good.   Nausea sometimes  Vomiting no  Constipation occ  Diarrhea occ  Rashes yes  Hair Loss no  Calf tenderness no  Breathing difficulty no  Reactive Hypoglycemia no  Light Headedness no   Moods OK    12 point ROS as above and otherwise negative      Habits:  Alcohol:  no  Tobacco: no  Caffeine " "1c/d  NSAIDS no  Exercise Routine: none-was doing classes and elliptical at [employer]-  3 meals/day B: protein shake L: leftovers D: burritos  Protein first yes  ? grams/day  Water Separate from meals trying to get back to that  Calorie Containing Beverages yes-regular  Restaurant eating/wk rare  Sleeping \"I get good sleep.\"  Stress better  CPAP: NA  Contraception: hyst    Social History     Social History     Socioeconomic History     Marital status:      Spouse name: Not on file     Number of children: Not on file     Years of education: Not on file     Highest education level: Not on file   Social Needs     Financial resource strain: Not on file     Food insecurity - worry: Not on file     Food insecurity - inability: Not on file     Transportation needs - medical: Not on file     Transportation needs - non-medical: Not on file   Occupational History     Not on file   Tobacco Use     Smoking status: Former Smoker     Smokeless tobacco: Never Used   Substance and Sexual Activity     Alcohol use: No     Drug use: No     Sexual activity: Yes     Partners: Male     Birth control/protection: Surgical   Other Topics Concern     Not on file   Social History Narrative    Single, Fiance' father of her children living together    Working FT [employer]    2 daughters, one daughter graduated from [employer] and going to [employer]    2 Sons        Past Medical History     Past Medical History:   Diagnosis Date     Arthritis     right knee     Diabetes mellitus (H)      Fibromyalgia, primary      GERD (gastroesophageal reflux disease)     after her sleeve     Knee pain      Morbid obesity (H)      Problem List     Patient Active Problem List   Diagnosis     GERD (gastroesophageal reflux disease)     Arthritis     Morbid obesity with BMI of 45.0-49.9, adult (H)     Hypertension     Morbid obesity (H)     Knee pain     Medications     Current Outpatient Medications   Medication Sig Note     cholecalciferol, " "vitamin D3, (VITAMIN D3) 5,000 unit Tab Take 1 tablet (5,000 Units total) by mouth daily.      cyanocobalamin, vitamin B-12, 1,000 mcg Subl Place 1 tablet (1,000 mcg total) under the tongue daily.      ergocalciferol (ERGOCALCIFEROL) 50,000 unit capsule Take 50,000 Units by mouth once a week.      hydroCHLOROthiazide (HYDRODIURIL) 25 MG tablet Take 25 mg by mouth daily. 5/10/2018: Received from: [facility] Received Sig: Take 1 Tab by mouth daily.     metFORMIN (GLUCOPHAGE) 1000 MG tablet Take 1,000 mg by mouth daily. 5/10/2018: Received from: [facility] Received Sig: Take 1 Tab by mouth two times a day.     PRENATAL VITAMIN PLUS LOW IRON 27 mg iron- 1 mg Tab Take 2 tablets by mouth daily.      thiamine HCl, vitamin B1, (VITAMIN B-1) 250 MG tablet Take 1 tablet (250 mg total) by mouth daily.      triamcinolone (KENALOG) 0.1 % ointment Apply thin layer on effected area      liraglutide (VICTOZA) 0.6 mg/0.1 mL (18 mg/3 mL) PnIj injection Inject 1.8 mg under the skin daily.      omeprazole (PRILOSEC) 20 MG capsule Take 1 capsule (20 mg total) by mouth daily.      pen needle, diabetic 32 gauge x \" Ndle Inject 1 Units under the skin daily.      Surgical History     Past Surgical History  She has a past surgical history that includes Small intestine surgery ();  section, classic (); and Partial hysterectomy ().    Objective-Exam     Constitutional:  /84   Pulse 67   Resp 18   Ht 5' 7\" (1.702 m)   Wt (!) 300 lb (136.1 kg)   SpO2 100%   BMI 46.99 kg/m    Height: 5' 7\" (1.702 m) (2019  8:05 AM)  Weight: (!) 300 lb (136.1 kg) (2019  8:05 AM)  BMI (Calculated): 47 (2019  8:05 AM)  SpO2: 100 % (2019  8:05 AM)    General:  Pleasant and in no acute distress   Eyes:  EOMI  ENT:  Airway 1+  Moist mucous membranes  Neck:  Supple, No LAD, No thyromegaly, No carotid bruits appreciated  Respiratory: Normal respiratory effort, no cough, wheezes or crackles  CV:  Regular rate and " Rhythm,no murmurs, pulses 2+, no calf tenderness, no  LE edema  Gastrointestinal: Abdomen NT/ND, BS+  Musculoskeletal: muscle mass WNL  Skin: color baseline hair full, incisions nicely healed  Neurological: No tremor, normal gait  Psychiatric: alert and oriented X3, mood and affect normal    Counseling     We reviewed the important post op bariatric recommendations:  -eating 3 meals daily  -eating protein first, getting >60gm protein daily  -eating slowly, chewing food well  -avoiding/limiting calorie containing beverages  -drinking water 15-30 minutes before or after meals  -choosing wheat, not white with breads, crackers, pastas, henrry, bagels, tortillas, rice  -limiting restaurant or cafeteria eating to twice a week or less    We discussed the importance of restorative sleep and stress management in maintaining a healthy weight.  We discussed the National Weight Control Registry healthy weight maintenance strategies and ways to optimize metabolism.  We discussed the importance of physical activity including cardiovascular and strength training in maintaining a healthier weight.    We discussed the importance of life-long vitamin supplementation and life-long  follow-up.    Shana was reminded that, to avoid marginal ulcers she should avoid tobacco at all, alcohol in excess, caffeine in excess, and NSAIDS (unless indicated for cardioprotection or othewise and opposed by a PPI).    Mirtha Combs MD, FAAFP  [facility].  2/5/2019  8:22 AM      No images are attached to the encounter.  45 minutes spent with patient. >50% in counseling and coordination of care.

## 2021-06-23 NOTE — TELEPHONE ENCOUNTER
MD Combs patient:    Patient wants a revisional surgery on her lap band from 7 years ago, due to weight gain. She would like to know before she comes in for the scheduled appointment, if this is something the surgeon would do. She is an established patient with MD Combs, and would like a call to her home number on file with any further questions.    Thank You,    Elizabeth CAMPBELL

## 2021-06-24 NOTE — PROGRESS NOTES
Initial Structured Weight Loss Supervised Diet Evaluation     Assessment:  Pt. is a 53 y.o. female is being seen today for initial RD nutritional evaluation. Pt. has been unsuccessful with non-surgical weight loss methods and is interested in bariatric surgery. Today we reviewed current eating habits and level of physical activity, and instructed on the changes that are required for successful bariatric outcomes.    -GAVE pt binder at this appt  ++Pt has LSG 7 years ago; 240lbs low weight   +one year post-op would like to be off more meds and improve mobility and knee pain (does not want knee surgery)   Workflow review: Pt has completed labs, working with psych and has not attended support group  Weight goal: At or below initial weight     +getting  in June  ; children and fiance are supportive   -daughter would like LSG    Pt Active Problem List Diagnosis:     Patient Active Problem List   Diagnosis     GERD (gastroesophageal reflux disease)     Arthritis     Morbid obesity with BMI of 45.0-49.9, adult (H)     Hypertension     Morbid obesity (H)     Knee pain     Pt's Initial Weight: 300 lbs  Weight: (!) 299 lb (135.6 kg)  Weight loss from initial: 1  % Weight loss: 0.33 %    BMI: Body mass index is 46.83 kg/m .    Food allergies, intolerances, and Congregational customs: none    Diabetes  Testing Blood Sugars:    Last A1C, (per pt. report): in chart  DM Meds currently taking: in chart    Vitamins   Educated on post-op vitamin regimen: Multi Vit + iron 2x/day, calcium citrate 500-600 mg 2x/day, 7329-7391 mcg of Sublingual B-12 daily, and 5000 IU Vitamin D3 daily.    Biggest struggle with weight loss: finding the balance of movement and nutrition; denies stress eating; boredom snacking present     Who does the grocery shopping for your household? Self and fiance   Who prepares your meals at home? Fiance (yeimi)   -lives with daughter and fiance     Diet Recall/Time: wakes at 4am  Prayer   Breakfast:  recently started eating toast w/ PB OR 2 barrera strips and bread sometimes with egg OR slimfast smoothie w/ fruit    Am Snack: snacking on jolly ranchers or other candies   Lunch: leftovers below  Pm snack: none  Dinner: Pro/Veg/CHO   HS Snack: occasional cookies when around     Typical Snacks: above    Fried Foods: 1 times per week    Meals per week away from home: 1    Recommended limiting eating out to no more than 2x/week.  Patient and I reviewed the importance of eating three consistent meals per day; as well as meal timing to be spaced 4-5 hours apart.  Snack choices: 100-150 calories (1-2x/day if physically hungry), incorporating a fruit/vegetable w/ protein source.    Portion Sizes problematic? no per patient/diet recall  Encouraged slowing meal times down, 20-30 minutes, chewing to applesauce consistency.   To aid in proper portion control and slow meal time down discussed consuming meals off smaller plates, use toddler/children utensils and set utensils down after each bite.    Protein, vegetables/fruits, carbohydrates:   Reviewed lean protein sources today. Recommended consuming 15-20gm protein at 3 meals daily    Beverages (Type/Oz. per day)  Water: 40-50oz  Coffee: 1/2-1 cup daily   Tea: none  Milk: every other week   Regular soda: 3x/week down from daily   Diet soda: none  Juice: none  Joel-Aid/lemonade/etc: none  Alcohol: rare    Discussed the importance of adequate hydration after surgery and the goal of 64+ oz. of fluid daily.   The patient understands the importance of avoiding all carbonated, caffeinated and sweetened drinks; instead choosing 64 oz. plain water.    Fluid-meal separation:  The patient and I reviewed the anatomy of the bypass and why  fluids from a meal is so important.    Exercise  [employer] membership (swimming lessons 2X/week) and one more day on her own     Pt. s understands that 30-60 minutes of daily activity is an important part of bariatric surgery success.   Encouraged  pt. to incorporate upper body strength training exercise, even if its lifting soup cans while watching TV at night, doing pushups/sit-ups, and abdominal work.    PES statement:   1. (NI-1.3)Excessive energy intake related to Food and nutrition related knowledge deficit concerning excessive energy/oral intake as evidenced by Intake of high caloric density foods/beverages (soda) at meals and/or snacks; large portions; frequent grazing; Estimated intake that exceeds estimated daily energy intake and BMI 46.83    2. (NC-3.3.5) Obese, class III, BMI ?40 related to physical inactivity as evidenced by Infrequent, low-duration and or low intensity physical activity; and Large amounts of sedentary activities, no structured exercise regimen.     Intervention  Discussion:    1. Educated pt on the [place] to Bariatric Success handout.  2. Reviewed lean protein sources today. Recommended consuming 15-20gm protein at 3 meals daily  3. Gave food journal homework, to be completed for f/u appointment.  4. Bariatric Plate: The patient and I discussed the importance of including lean/low  fat protein at each meal and limiting carbohydrate intake to less  than 25% of plate volume.    Instructions/Goals:     1. Include 15-20gm lean protein at each meal.  2. Increase vegetable/fruit intake, by having a vegetable or fruit with each meal daily. Recommended pt to increase vegetable/fruit intake to 4-5 servings daily.  3. Increase fluid intake to 64oz daily: choose plain or calorie/carbonation-free beverages.  4. Incorporate daily structured activity, 30-60 minutes most days of the week  5. Fill out food journal and bring to next month's visit.  6. Practice plate method: 1/2 plate lean/low fat protein source, vegetable/fruit, <25% of plate complex carbohydrates.  7. Read food labels more consistently: keeping total fat grams <10, total sugar grams <10, fiber >3gm per serving.  8. Separate fluids 30 minutes before/after meal times.  9. Practice  eating off of smaller plates/bowls, chewing to applesauce consistency, taking 20-30 minutes to eat in a calm/relaxed environment without distractions of tv/email/cell phone.    Handouts Provided:  Pt. [facility] Patient Handbook  [place] to Bariatric Success  Bariatric Plate  Food journal      Monitor/Evaluation:  Pt. s target weight: no gain from initial visit, pt. verbalized understanding.     Has realistic expectations for weight loss: Yes  Verbalizes understanding of dietary changes post procedure: Yes  Verbalizes understanding of supplement needs: Yes  Verbalizes willingness to participate in physical activity: Yes  Motivation for change: average  RD s prediction for client success and compliance on a scale of 1 (low) to 10 (high):  5    Plan for next visit:   Review Bariatric plate and food journal homework.  Educate on dumping syndrome and reading food labels.  (Final Supervised Diet visit with RD) pre/post-op  diet progression, give review of surgery process.  Review [place] to Bariatric Success  Check Understanding Quiz    Time In: 1:30p  Time Out: 2:15p    ABN signed: Yes

## 2021-06-24 NOTE — PROGRESS NOTES
HPI: Shana Ventura is a 53 y.o. female who is 7 years status post a sleeve gastrectomy.  She states that the time of surgery she weighed about 310 pounds.  She was able to get down to 240 pounds but then has regained most of her weight back and currently weighs almost 300 pounds again.  She is type II diabetic and states she never really did come off her Metformin and continues to use that.  She has recently been started on liraglutide to help with her diabetic control as well as to help with some weight loss.  She is developed fairly significant gastroesophageal reflux for which she is using omeprazole.  She also has significant degenerative joint disease and has been told that she may need a knee replacement but given her relatively young age she should try to hold off as long as possible and that weight loss would be extremely beneficial for her.  She has tried medical management of her weight gain but without real success.  The time of her sleeve gastrectomy I also did an umbilical hernia repair.      Allergies:Penicillins and Codeine    Past Medical History:   Diagnosis Date     Arthritis     right knee     Diabetes mellitus (H)      Fibromyalgia, primary      GERD (gastroesophageal reflux disease)     after her sleeve     Knee pain      Morbid obesity (H)        Past Surgical History:   Procedure Laterality Date      SECTION, CLASSIC  1997     GASTRECTOMY LAPAROSCOPIC SLEEVE  2013    Dr. Nathan     PARTIAL HYSTERECTOMY         CURRENT MEDS:  Current Outpatient Medications   Medication Sig Dispense Refill     cholecalciferol, vitamin D3, (VITAMIN D3) 5,000 unit Tab Take 1 tablet (5,000 Units total) by mouth daily. 90 tablet prn     cyanocobalamin, vitamin B-12, 1,000 mcg Subl Place 1 tablet (1,000 mcg total) under the tongue daily. 90 tablet prn     ergocalciferol (ERGOCALCIFEROL) 50,000 unit capsule Take 50,000 Units by mouth once a week.       hydroCHLOROthiazide (HYDRODIURIL) 25 MG tablet Take 25  "mg by mouth daily.       liraglutide (VICTOZA) 0.6 mg/0.1 mL (18 mg/3 mL) PnIj injection Inject 1.8 mg under the skin daily. 27 mL prn     metFORMIN (GLUCOPHAGE) 1000 MG tablet Take 1,000 mg by mouth daily.       omeprazole (PRILOSEC) 20 MG capsule Take 1 capsule (20 mg total) by mouth daily. 90 capsule prn     pen needle, diabetic 32 gauge x 5/32\" Ndle Inject 1 Units under the skin daily. 100 each prn     PRENATAL VITAMIN PLUS LOW IRON 27 mg iron- 1 mg Tab Take 2 tablets by mouth daily.  2     thiamine HCl, vitamin B1, (VITAMIN B-1) 250 MG tablet Take 1 tablet (250 mg total) by mouth daily. 90 tablet prn     triamcinolone (KENALOG) 0.1 % ointment Apply thin layer on effected area       No current facility-administered medications for this visit.          Family History   Problem Relation Age of Onset     Cancer Mother      Diabetes Father      Obesity Sister         reports that she has quit smoking. she has never used smokeless tobacco. She reports that she does not drink alcohol or use drugs.        /79   Pulse 79   Resp 18   Ht 5' 7\" (1.702 m)   Wt (!) 299 lb (135.6 kg)   SpO2 99%   BMI 46.83 kg/m    Wt Readings from Last 3 Encounters:   02/19/19 (!) 299 lb (135.6 kg)   02/05/19 (!) 300 lb (136.1 kg)   08/23/18 (!) 299 lb (135.6 kg)     Body mass index is 46.83 kg/m .    EXAM:  GENERAL: This is a well-developed 53 y.o. female who appears her stated age  ABDOMEN: Obese.  Nontender.  I cannot appreciate an obvious recurrent umbilical hernia.    LABS:  Lab Results   Component Value Date    WBC 6.4 05/10/2018    WBC 5.6 09/18/2014    HGB 10.7 (L) 05/10/2018    HCT 32.2 (L) 05/10/2018    MCV 89 05/10/2018     05/10/2018     INR/Prothrombin Time      Lab Results   Component Value Date    HGBA1C 6.8 (H) 05/10/2018     Lab Results   Component Value Date    ALT 15 05/10/2018    AST 19 05/10/2018    ALKPHOS 107 05/10/2018    BILITOT 0.3 05/10/2018       Assessment/Plan: 53 y.o. female who is post a " sleeve gastrectomy 7 years ago.  She did have some moderate success losing about 60-70 pounds but has regained almost all that back.  She continues to be type 2 diabetes diabetic with an A1c of 7.1.  She has developed reflux as well as significant degenerative joint disease typically her right knee which is being given consideration for total knee replacement.  She is interested in revision of her sleeve gastrectomy and I would concur that revising this to a Aravind-en-Y gastric bypass would be extremely beneficial for her.  I think this would help resolve her type 2 diabetes, give her significant weight loss to improve her degenerative joint disease as well as hopefully resolve her reflux.  She will continue to go through our program and I will plan on seeing her back after she completes her dietary requirements and psychological evaluation.  I spent 30 minutes with the patient outside of the exam and counseling.  Counseling was a majority of this visit.            Catarino Nathan MD  [facility] Department of Surgery
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
## 2021-06-24 NOTE — PATIENT INSTRUCTIONS - HE
Has had sleeve gastrectomy in past. Will get x-ray of stomach and then continue with rest of program.

## 2021-06-25 NOTE — PROGRESS NOTES
Health and Behavior Assessment with Intervention, Follow up (60 minutes): Met with patient 1:1 to review support system, psychological testing and mindful eating strategies.  Shana has made quality changes in her eating and but still needs to be more mindful about her eating.  She will follow-up with a list of activities and mindful eating strategies.  Diagnoses: F 54; E 66.01

## 2021-09-02 ENCOUNTER — TRANSFERRED RECORDS (OUTPATIENT)
Dept: HEALTH INFORMATION MANAGEMENT | Facility: CLINIC | Age: 56
End: 2021-09-02
Payer: COMMERCIAL

## 2021-09-05 ENCOUNTER — HEALTH MAINTENANCE LETTER (OUTPATIENT)
Age: 56
End: 2021-09-05

## 2021-10-01 ENCOUNTER — VIRTUAL VISIT (OUTPATIENT)
Dept: SURGERY | Facility: CLINIC | Age: 56
End: 2021-10-01
Payer: COMMERCIAL

## 2021-10-01 VITALS — WEIGHT: 289 LBS | BODY MASS INDEX: 45.36 KG/M2 | HEIGHT: 67 IN

## 2021-10-01 DIAGNOSIS — K21.9 GASTROESOPHAGEAL REFLUX DISEASE WITHOUT ESOPHAGITIS: ICD-10-CM

## 2021-10-01 DIAGNOSIS — E66.01 MORBID OBESITY (H): ICD-10-CM

## 2021-10-01 DIAGNOSIS — E11.9 TYPE 2 DIABETES MELLITUS WITHOUT COMPLICATION, WITHOUT LONG-TERM CURRENT USE OF INSULIN (H): ICD-10-CM

## 2021-10-01 DIAGNOSIS — K91.2 POSTOPERATIVE MALABSORPTION: Primary | ICD-10-CM

## 2021-10-01 PROCEDURE — 99214 OFFICE O/P EST MOD 30 MIN: CPT | Mod: 95 | Performed by: FAMILY MEDICINE

## 2021-10-01 RX ORDER — ATORVASTATIN CALCIUM 10 MG/1
10 TABLET, FILM COATED ORAL DAILY
COMMUNITY
Start: 2021-09-02 | End: 2022-04-15

## 2021-10-01 RX ORDER — LISINOPRIL/HYDROCHLOROTHIAZIDE 10-12.5 MG
1 TABLET ORAL DAILY
COMMUNITY
Start: 2021-09-02 | End: 2022-04-15

## 2021-10-01 RX ORDER — TRANEXAMIC ACID 100 MG/ML
1 INJECTION, SOLUTION INTRAVENOUS
COMMUNITY
Start: 2020-04-13 | End: 2022-04-15

## 2021-10-01 RX ORDER — ACETAMINOPHEN 500 MG
1000 TABLET ORAL EVERY 6 HOURS PRN
Status: ON HOLD | COMMUNITY
Start: 2020-03-19 | End: 2024-01-23

## 2021-10-01 RX ORDER — ALBUTEROL SULFATE 90 UG/1
1-2 AEROSOL, METERED RESPIRATORY (INHALATION) EVERY 4 HOURS PRN
COMMUNITY
Start: 2019-12-19 | End: 2022-09-01

## 2021-10-01 RX ORDER — METFORMIN HCL 500 MG
TABLET, EXTENDED RELEASE 24 HR ORAL
COMMUNITY
Start: 2021-07-17 | End: 2022-04-15

## 2021-10-01 RX ORDER — ERGOCALCIFEROL 1.25 MG/1
50000 CAPSULE, LIQUID FILLED ORAL WEEKLY
COMMUNITY
Start: 2021-07-28

## 2021-10-01 RX ORDER — DULOXETIN HYDROCHLORIDE 60 MG/1
60 CAPSULE, DELAYED RELEASE ORAL
COMMUNITY
End: 2022-04-15

## 2021-10-01 RX ORDER — AMITRIPTYLINE HYDROCHLORIDE 75 MG/1
75 TABLET ORAL
COMMUNITY
End: 2022-04-15

## 2021-10-01 RX ORDER — MINOCYCLINE HYDROCHLORIDE 100 MG/1
100 CAPSULE ORAL 2 TIMES DAILY
COMMUNITY
Start: 2021-09-22 | End: 2024-01-21

## 2021-10-01 RX ORDER — LANCETS
EACH MISCELLANEOUS 2 TIMES DAILY
COMMUNITY
Start: 2021-01-19

## 2021-10-01 RX ORDER — CLINDAMYCIN PHOSPHATE 10 MG/G
GEL TOPICAL
COMMUNITY
Start: 2021-09-02 | End: 2022-04-15

## 2021-10-01 RX ORDER — HYDROCHLOROTHIAZIDE 25 MG/1
25 TABLET ORAL DAILY
COMMUNITY
Start: 2021-07-02 | End: 2023-01-17

## 2021-10-01 RX ORDER — ASPIRIN 81 MG
TABLET,CHEWABLE ORAL
COMMUNITY
Start: 2021-09-04 | End: 2022-04-15

## 2021-10-01 RX ORDER — CYANOCOBALAMIN 500 UG/1
SPRAY NASAL
COMMUNITY
Start: 2021-09-22 | End: 2021-12-13

## 2021-10-01 RX ORDER — DULAGLUTIDE 0.75 MG/.5ML
INJECTION, SOLUTION SUBCUTANEOUS
COMMUNITY
Start: 2021-09-08 | End: 2022-04-15

## 2021-10-01 RX ORDER — PRENATAL WITH FERROUS FUM AND FOLIC ACID 3080; 920; 120; 400; 22; 1.84; 3; 20; 10; 1; 12; 200; 27; 25; 2 [IU]/1; [IU]/1; MG/1; [IU]/1; MG/1; MG/1; MG/1; MG/1; MG/1; MG/1; UG/1; MG/1; MG/1; MG/1; MG/1
1 TABLET ORAL DAILY
COMMUNITY
Start: 2021-01-14

## 2021-10-01 RX ORDER — BLOOD SUGAR DIAGNOSTIC
STRIP MISCELLANEOUS
COMMUNITY
Start: 2021-09-29

## 2021-10-01 ASSESSMENT — MIFFLIN-ST. JEOR: SCORE: 1943.53

## 2021-10-01 NOTE — LETTER
10/1/2021         RE: Shana Ventura  [address]  [address]  [facility] [address]        Dear Colleague,    Thank you for referring your patient, Shana Ventura, to the [facility] [place]. Please see a copy of my visit note below.    35Shana Ventura is 56 year old  female who presents for a billable video visit today.    How would you like to obtain your AVS? MyChart  If dropped from the video visit, the video invitation should be resent by: Text to cell phone: [phone]  Will anyone else be joining your video visit? No      Video then Phone start time 9:10      Bariatric Follow Up Visit with a History of Previous Bariatric Surgery     Date of visit: 10/1/2021  Physician: Mirtha Combs MD, MD  Primary Care Provider:  Natalia Sanchez HORACE Lorenzo   56 year old  female    Date of Surgery: 12/7/2011  Initial Weight: 336#  Initial BMI: *Today's Weight:   Wt Readings from Last 1 Encounters:   10/01/21 131.1 kg (289 lb)     Body mass index is 45.26 kg/m .      Assessment and Plan     Assessment: Shana is a 56 year old year old female who is 10 yrs s/p  Sleeve Gastrectomy with Dr. Nathan  Shana HORACE Lorenzo feels as if she had achieved the goals she hoped to accomplish through bariatric surgery and weight loss.    Encounter Diagnoses   Name Primary?     Postoperative malabsorption Yes     Morbid obesity (H)      Gastroesophageal reflux disease without esophagitis      Type 2 diabetes mellitus without complication, without long-term current use of insulin (H)        Current Outpatient Medications:      ACCU-CHEK GUIDE test strip, , Disp: , Rfl:      acetaminophen (TYLENOL) 500 MG tablet, Take 1,000 mg by mouth, Disp: , Rfl:      albuterol (PROAIR HFA/PROVENTIL HFA/VENTOLIN HFA) 108 (90 Base) MCG/ACT inhaler, Inhale 1-2 puffs into the lungs, Disp: , Rfl:      amitriptyline (ELAVIL) 75 MG tablet, Take 75 mg by mouth, Disp: , Rfl:      atorvastatin  (LIPITOR) 10 MG tablet, Take 10 mg by mouth daily, Disp: , Rfl:      blood glucose monitoring (ACCU-CHEK FASTCLIX) lancets, 2 times daily, Disp: , Rfl:      Capsaicin 0.1 % cream, APPLY THIN LAYER TOPICALLY TO KNEE THREE TIMES DAILY, Disp: , Rfl:      clindamycin (CLINDAMAX) 1 % external gel, APPLY TOPICALLY SPARINGLY TO THE AFFECTED AREA TWICE DAILY OR AS DIRECTED, Disp: , Rfl:      Dextrose, Diabetic Use, (GLUCOSE) 1 g CHEW, Take 4 tablets by mouth, Disp: , Rfl:      DULoxetine (CYMBALTA) 60 MG capsule, Take 60 mg by mouth, Disp: , Rfl:      hydrochlorothiazide (HYDRODIURIL) 25 MG tablet, Take 25 mg by mouth daily, Disp: , Rfl:      HYDROcodone-acetaminophen (NORCO) 5-325 MG per tablet, Take 1 tablet by mouth every 4 hours as needed for moderate to severe pain, Disp: 15 tablet, Rfl: 0     lisinopril-hydrochlorothiazide (ZESTORETIC) 10-12.5 MG tablet, Take 1 tablet by mouth daily, Disp: , Rfl:      metFORMIN (GLUCOPHAGE-XR) 500 MG 24 hr tablet, TAKE 4 TABLETS BY MOUTH DAILY WITH BREAKFAST, Disp: , Rfl:      minocycline (MINOCIN) 100 MG capsule, Take 100 mg by mouth 2 times daily, Disp: , Rfl:      NASCOBAL 500 MCG/0.1ML nasal spray, , Disp: , Rfl:      NO ACTIVE MEDICATIONS, , Disp: , Rfl:      Prenatal Vit-Fe Fumarate-FA (PRENATAL MULTIVITAMIN W/IRON) 27-0.8 MG tablet, Take 2 tablets by mouth daily, Disp: , Rfl:      tranexamic acid (CYKLOKAPRON) 1000 MG/10ML injection, Spray 1 spray in nostril, Disp: , Rfl:      TRULICITY 0.75 MG/0.5ML pen, ADMINISTER 0.75 MG UNDER THE SKIN 1 TIME A WEEK, Disp: , Rfl:      vitamin D2 (ERGOCALCIFEROL) 01704 units (1250 mcg) capsule, , Disp: , Rfl:     Plan: Will pursue ROB. Will set dietitian visit and await guidance from Celia. Annual labs. Sent link to online seminar. Stopping amitriptyline would be helpful. Discussed potential panniculectomy if indicated after significant weight loss.    Return in about 1 month (around 11/1/2021).    Bariatric Surgery Review     Interim  History/LifeChanges: Maintaining 46# weight loss. Fluctuating between 280-290#. Contemplating revision to ROB.  Concerned about losing too much weight and having extra skin. That's what stopped her from going through with ROB. Had palpitations with phentermine. Overall, her health has been good. Glucose has been OK on just Trulicity. Not taking metformin. Was starting and stopping metfomin d/t stomach issues    Patient Concerns: Would like to pursue weight loss. Revision. Concerned about skin.  Appetite (1-10): up  GERD: yes    Medication changes: stopped metformin    Vitamin Intake:   B-12   SL   MVI  2/d   Vitamin D  5,000   Calcium        Other  thiamine              LABS: ordered    Nausea no  Vomiting no  Constipation no  Diarrhea  noon and off  Rashes yes  Hair Loss no  Calf tenderness no  Breathing difficulty no  Reactive Hypoglycemia no  Light Headedness no   Moods euthymic    12 point ROS as above and otherwise negative      Habits:  Alcohol: rare  Tobacco: no  Caffeine no  NSAIDS   Exercise Routine:as able  3 meals/day yes  Protein first yes  ?grams/day  Water Separate from meals   Calorie Containing Beverages   Restaurant eating/wk   Sleeping   Stress moderate  CPAP:   Contraception: Hyst  DEXA:not indicated    Social History     Social History     Socioeconomic History     Marital status:      Spouse name: Not on file     Number of children: Not on file     Years of education: Not on file     Highest education level: Not on file   Occupational History     Not on file   Tobacco Use     Smoking status: Never Smoker     Smokeless tobacco: Never Used   Substance and Sexual Activity     Alcohol use: Yes     Comment: Alcoholic Drinks/day: Special Occ.      Drug use: No     Sexual activity: Yes     Partners: Male     Birth control/protection: Surgical   Other Topics Concern     Not on file   Social History Narrative    Single, Lee' father of her children living together  Working FT [employer]  2 daughters, one daughter graduated from [employer] and going to Law School  2 Sons      Social Determinants of Health     Financial Resource Strain:      Difficulty of Paying Living Expenses:    Food Insecurity:      Worried About Running Out of Food in the Last Year:      Ran Out of Food in the Last Year:    Transportation Needs:      Lack of Transportation (Medical):      Lack of Transportation (Non-Medical):    Physical Activity:      Days of Exercise per Week:      Minutes of Exercise per Session:    Stress:      Feeling of Stress :    Social Connections:      Frequency of Communication with Friends and Family:      Frequency of Social Gatherings with Friends and Family:      Attends Advent Services:      Active Member of Clubs or Organizations:      Attends Club or Organization Meetings:      Marital Status:    Intimate Partner Violence:      Fear of Current or Ex-Partner:      Emotionally Abused:      Physically Abused:      Sexually Abused:        Past Medical History     Past Medical History:   Diagnosis Date     Arthritis     right knee     Diabetes mellitus (H)      Fibromyalgia, primary      GERD (gastroesophageal reflux disease)     after her sleeve     Hypertension      Knee pain      Morbid obesity (H)      Problem List     Patient Active Problem List   Diagnosis     GERD (gastroesophageal reflux disease)     Arthritis     Morbid obesity with BMI of 45.0-49.9, adult (H)     Hypertension     Morbid obesity (H)     Knee pain     Diabetes mellitus, type 2 (H)     Medications       Current Outpatient Medications:      ACCU-CHEK GUIDE test strip, , Disp: , Rfl:      acetaminophen (TYLENOL) 500 MG tablet, Take 1,000 mg by mouth, Disp: , Rfl:      albuterol (PROAIR HFA/PROVENTIL HFA/VENTOLIN HFA) 108 (90 Base) MCG/ACT inhaler, Inhale 1-2 puffs into the lungs, Disp: , Rfl:      amitriptyline (ELAVIL) 75 MG tablet, Take 75 mg by mouth, Disp: , Rfl:      atorvastatin (LIPITOR) 10 MG tablet, Take 10 mg  "by mouth daily, Disp: , Rfl:      blood glucose monitoring (ACCU-CHEK FASTCLIX) lancets, 2 times daily, Disp: , Rfl:      Capsaicin 0.1 % cream, APPLY THIN LAYER TOPICALLY TO KNEE THREE TIMES DAILY, Disp: , Rfl:      clindamycin (CLINDAMAX) 1 % external gel, APPLY TOPICALLY SPARINGLY TO THE AFFECTED AREA TWICE DAILY OR AS DIRECTED, Disp: , Rfl:      Dextrose, Diabetic Use, (GLUCOSE) 1 g CHEW, Take 4 tablets by mouth, Disp: , Rfl:      DULoxetine (CYMBALTA) 60 MG capsule, Take 60 mg by mouth, Disp: , Rfl:      hydrochlorothiazide (HYDRODIURIL) 25 MG tablet, Take 25 mg by mouth daily, Disp: , Rfl:      HYDROcodone-acetaminophen (NORCO) 5-325 MG per tablet, Take 1 tablet by mouth every 4 hours as needed for moderate to severe pain, Disp: 15 tablet, Rfl: 0     lisinopril-hydrochlorothiazide (ZESTORETIC) 10-12.5 MG tablet, Take 1 tablet by mouth daily, Disp: , Rfl:      metFORMIN (GLUCOPHAGE-XR) 500 MG 24 hr tablet, TAKE 4 TABLETS BY MOUTH DAILY WITH BREAKFAST, Disp: , Rfl:      minocycline (MINOCIN) 100 MG capsule, Take 100 mg by mouth 2 times daily, Disp: , Rfl:      NASCOBAL 500 MCG/0.1ML nasal spray, , Disp: , Rfl:      NO ACTIVE MEDICATIONS, , Disp: , Rfl:      Prenatal Vit-Fe Fumarate-FA (PRENATAL MULTIVITAMIN W/IRON) 27-0.8 MG tablet, Take 2 tablets by mouth daily, Disp: , Rfl:      tranexamic acid (CYKLOKAPRON) 1000 MG/10ML injection, Spray 1 spray in nostril, Disp: , Rfl:      TRULICITY 0.75 MG/0.5ML pen, ADMINISTER 0.75 MG UNDER THE SKIN 1 TIME A WEEK, Disp: , Rfl:      vitamin D2 (ERGOCALCIFEROL) 42122 units (1250 mcg) capsule, , Disp: , Rfl:    Surgical History     Past Surgical History  She has a past surgical history that includes Gastrectomy Laparoscopic Sleeve ();  section (); and Partial Hysterectomy ().    Objective-Exam     Constitutional:  Ht 1.702 m (5' 7\")   Wt 131.1 kg (289 lb)   BMI 45.26 kg/m    General:  Pleasant and in no acute distress     Psychiatric: alert and oriented " X3, mood and affect normal    Counseling     We reviewed the important post op bariatric recommendations:  -eating 3 meals daily  -eating protein first, getting >60gm protein daily  -eating slowly, chewing food well  -avoiding/limiting calorie containing beverages  -drinking water 15-30 minutes before or after meals  -choosing wheat, not white with breads, crackers, pastas, henrry, bagels, tortillas, rice  -limiting restaurant or cafeteria eating to twice a week or less    We discussed the importance of restorative sleep and stress management in maintaining a healthy weight.  We discussed the National Weight Control Registry healthy weight maintenance strategies and ways to optimize metabolism.  We discussed the importance of physical activity including cardiovascular and strength training in maintaining a healthier weight.    We discussed the importance of life-long vitamin supplementation and life-long  follow-up.    Shana was reminded that, to avoid marginal ulcers she should avoid tobacco at all, alcohol in excess, caffeine in excess, and NSAIDS (unless indicated for cardioprotection or othewise and opposed by a PPI).    Mirtha Combs MD, MD, [facility].  10/1/2021  9:51 AM  Total time spent on the date of this encounter doing: chart review, review of test results, patient visit, physical exam, education, counseling, developing plan of care, and documenting = 35 minutes.      Video-Visit Details    Type of service:  Video Visit    Phone end time 10:15  Originating Location (pt. Location): Home    Distant Location (provider location):  [facility] [place]     Platform used for Video Visit: Doximity/phone      Again, thank you for allowing me to participate in the care of your patient.        Sincerely,        Mirtha Combs MD

## 2021-10-01 NOTE — PROGRESS NOTES
35Shana Ventura is 56 year old  female who presents for a billable video visit today.    How would you like to obtain your AVS? MyChart  If dropped from the video visit, the video invitation should be resent by: Text to cell phone: [phone]  Will anyone else be joining your video visit? No      Video then Phone start time 9:10      Bariatric Follow Up Visit with a History of Previous Bariatric Surgery     Date of visit: 10/1/2021  Physician: Mirtha Combs MD, MD  Primary Care Provider:  Natalia Sanchez  Shana Ventura   56 year old  female    Date of Surgery: 12/7/2011  Initial Weight: 336#  Initial BMI: *Today's Weight:   Wt Readings from Last 1 Encounters:   10/01/21 131.1 kg (289 lb)     Body mass index is 45.26 kg/m .      Assessment and Plan     Assessment: Shana is a 56 year old year old female who is 10 yrs s/p  Sleeve Gastrectomy with Dr. Nathan  Shana Ventura feels as if she had achieved the goals she hoped to accomplish through bariatric surgery and weight loss.    Encounter Diagnoses   Name Primary?     Postoperative malabsorption Yes     Morbid obesity (H)      Gastroesophageal reflux disease without esophagitis      Type 2 diabetes mellitus without complication, without long-term current use of insulin (H)        Current Outpatient Medications:      ACCU-CHEK GUIDE test strip, , Disp: , Rfl:      acetaminophen (TYLENOL) 500 MG tablet, Take 1,000 mg by mouth, Disp: , Rfl:      albuterol (PROAIR HFA/PROVENTIL HFA/VENTOLIN HFA) 108 (90 Base) MCG/ACT inhaler, Inhale 1-2 puffs into the lungs, Disp: , Rfl:      amitriptyline (ELAVIL) 75 MG tablet, Take 75 mg by mouth, Disp: , Rfl:      atorvastatin (LIPITOR) 10 MG tablet, Take 10 mg by mouth daily, Disp: , Rfl:      blood glucose monitoring (ACCU-CHEK FASTCLIX) lancets, 2 times daily, Disp: , Rfl:      Capsaicin 0.1 % cream, APPLY THIN LAYER TOPICALLY TO KNEE THREE TIMES DAILY, Disp: , Rfl:      clindamycin (CLINDAMAX) 1 % external gel,  APPLY TOPICALLY SPARINGLY TO THE AFFECTED AREA TWICE DAILY OR AS DIRECTED, Disp: , Rfl:      Dextrose, Diabetic Use, (GLUCOSE) 1 g CHEW, Take 4 tablets by mouth, Disp: , Rfl:      DULoxetine (CYMBALTA) 60 MG capsule, Take 60 mg by mouth, Disp: , Rfl:      hydrochlorothiazide (HYDRODIURIL) 25 MG tablet, Take 25 mg by mouth daily, Disp: , Rfl:      HYDROcodone-acetaminophen (NORCO) 5-325 MG per tablet, Take 1 tablet by mouth every 4 hours as needed for moderate to severe pain, Disp: 15 tablet, Rfl: 0     lisinopril-hydrochlorothiazide (ZESTORETIC) 10-12.5 MG tablet, Take 1 tablet by mouth daily, Disp: , Rfl:      metFORMIN (GLUCOPHAGE-XR) 500 MG 24 hr tablet, TAKE 4 TABLETS BY MOUTH DAILY WITH BREAKFAST, Disp: , Rfl:      minocycline (MINOCIN) 100 MG capsule, Take 100 mg by mouth 2 times daily, Disp: , Rfl:      NASCOBAL 500 MCG/0.1ML nasal spray, , Disp: , Rfl:      NO ACTIVE MEDICATIONS, , Disp: , Rfl:      Prenatal Vit-Fe Fumarate-FA (PRENATAL MULTIVITAMIN W/IRON) 27-0.8 MG tablet, Take 2 tablets by mouth daily, Disp: , Rfl:      tranexamic acid (CYKLOKAPRON) 1000 MG/10ML injection, Spray 1 spray in nostril, Disp: , Rfl:      TRULICITY 0.75 MG/0.5ML pen, ADMINISTER 0.75 MG UNDER THE SKIN 1 TIME A WEEK, Disp: , Rfl:      vitamin D2 (ERGOCALCIFEROL) 31211 units (1250 mcg) capsule, , Disp: , Rfl:     Plan: Will pursue ROB. Will set dietitian visit and await guidance from Celia. Annual labs. Sent link to online seminar. Stopping amitriptyline would be helpful. Discussed potential panniculectomy if indicated after significant weight loss.    Return in about 1 month (around 11/1/2021).    Bariatric Surgery Review     Interim History/LifeChanges: Maintaining 46# weight loss. Fluctuating between 280-290#. Contemplating revision to ROB.  Concerned about losing too much weight and having extra skin. That's what stopped her from going through with ROB. Had palpitations with phentermine. Overall, her health has been good.  Glucose has been OK on just Trulicity. Not taking metformin. Was starting and stopping metfomin d/t stomach issues    Patient Concerns: Would like to pursue weight loss. Revision. Concerned about skin.  Appetite (1-10): up  GERD: yes    Medication changes: stopped metformin    Vitamin Intake:   B-12   SL   MVI  2/d   Vitamin D  5,000   Calcium        Other  thiamine              LABS: ordered    Nausea no  Vomiting no  Constipation no  Diarrhea  noon and off  Rashes yes  Hair Loss no  Calf tenderness no  Breathing difficulty no  Reactive Hypoglycemia no  Light Headedness no   Moods euthymic    12 point ROS as above and otherwise negative      Habits:  Alcohol: rare  Tobacco: no  Caffeine no  NSAIDS   Exercise Routine:as able  3 meals/day yes  Protein first yes  ?grams/day  Water Separate from meals   Calorie Containing Beverages   Restaurant eating/wk   Sleeping   Stress moderate  CPAP:   Contraception: Hyst  DEXA:not indicated    Social History     Social History     Socioeconomic History     Marital status:      Spouse name: Not on file     Number of children: Not on file     Years of education: Not on file     Highest education level: Not on file   Occupational History     Not on file   Tobacco Use     Smoking status: Never Smoker     Smokeless tobacco: Never Used   Substance and Sexual Activity     Alcohol use: Yes     Comment: Alcoholic Drinks/day: Special Occ.      Drug use: No     Sexual activity: Yes     Partners: Male     Birth control/protection: Surgical   Other Topics Concern     Not on file   Social History Narrative    Single, Smithaance' father of her children living together  Working FT [employer]  2 daughters, one daughter graduated from [employer] and going to [employer]  2 Sons      Social Determinants of Health     Financial Resource Strain:      Difficulty of Paying Living Expenses:    Food Insecurity:      Worried About Running Out of Food in the Last Year:      Ran Out of Food  in the Last Year:    Transportation Needs:      Lack of Transportation (Medical):      Lack of Transportation (Non-Medical):    Physical Activity:      Days of Exercise per Week:      Minutes of Exercise per Session:    Stress:      Feeling of Stress :    Social Connections:      Frequency of Communication with Friends and Family:      Frequency of Social Gatherings with Friends and Family:      Attends Denominational Services:      Active Member of Clubs or Organizations:      Attends Club or Organization Meetings:      Marital Status:    Intimate Partner Violence:      Fear of Current or Ex-Partner:      Emotionally Abused:      Physically Abused:      Sexually Abused:        Past Medical History     Past Medical History:   Diagnosis Date     Arthritis     right knee     Diabetes mellitus (H)      Fibromyalgia, primary      GERD (gastroesophageal reflux disease)     after her sleeve     Hypertension      Knee pain      Morbid obesity (H)      Problem List     Patient Active Problem List   Diagnosis     GERD (gastroesophageal reflux disease)     Arthritis     Morbid obesity with BMI of 45.0-49.9, adult (H)     Hypertension     Morbid obesity (H)     Knee pain     Diabetes mellitus, type 2 (H)     Medications       Current Outpatient Medications:      ACCU-CHEK GUIDE test strip, , Disp: , Rfl:      acetaminophen (TYLENOL) 500 MG tablet, Take 1,000 mg by mouth, Disp: , Rfl:      albuterol (PROAIR HFA/PROVENTIL HFA/VENTOLIN HFA) 108 (90 Base) MCG/ACT inhaler, Inhale 1-2 puffs into the lungs, Disp: , Rfl:      amitriptyline (ELAVIL) 75 MG tablet, Take 75 mg by mouth, Disp: , Rfl:      atorvastatin (LIPITOR) 10 MG tablet, Take 10 mg by mouth daily, Disp: , Rfl:      blood glucose monitoring (ACCU-CHEK FASTCLIX) lancets, 2 times daily, Disp: , Rfl:      Capsaicin 0.1 % cream, APPLY THIN LAYER TOPICALLY TO KNEE THREE TIMES DAILY, Disp: , Rfl:      clindamycin (CLINDAMAX) 1 % external gel, APPLY TOPICALLY SPARINGLY TO THE  "AFFECTED AREA TWICE DAILY OR AS DIRECTED, Disp: , Rfl:      Dextrose, Diabetic Use, (GLUCOSE) 1 g CHEW, Take 4 tablets by mouth, Disp: , Rfl:      DULoxetine (CYMBALTA) 60 MG capsule, Take 60 mg by mouth, Disp: , Rfl:      hydrochlorothiazide (HYDRODIURIL) 25 MG tablet, Take 25 mg by mouth daily, Disp: , Rfl:      HYDROcodone-acetaminophen (NORCO) 5-325 MG per tablet, Take 1 tablet by mouth every 4 hours as needed for moderate to severe pain, Disp: 15 tablet, Rfl: 0     lisinopril-hydrochlorothiazide (ZESTORETIC) 10-12.5 MG tablet, Take 1 tablet by mouth daily, Disp: , Rfl:      metFORMIN (GLUCOPHAGE-XR) 500 MG 24 hr tablet, TAKE 4 TABLETS BY MOUTH DAILY WITH BREAKFAST, Disp: , Rfl:      minocycline (MINOCIN) 100 MG capsule, Take 100 mg by mouth 2 times daily, Disp: , Rfl:      NASCOBAL 500 MCG/0.1ML nasal spray, , Disp: , Rfl:      NO ACTIVE MEDICATIONS, , Disp: , Rfl:      Prenatal Vit-Fe Fumarate-FA (PRENATAL MULTIVITAMIN W/IRON) 27-0.8 MG tablet, Take 2 tablets by mouth daily, Disp: , Rfl:      tranexamic acid (CYKLOKAPRON) 1000 MG/10ML injection, Spray 1 spray in nostril, Disp: , Rfl:      TRULICITY 0.75 MG/0.5ML pen, ADMINISTER 0.75 MG UNDER THE SKIN 1 TIME A WEEK, Disp: , Rfl:      vitamin D2 (ERGOCALCIFEROL) 10866 units (1250 mcg) capsule, , Disp: , Rfl:    Surgical History     Past Surgical History  She has a past surgical history that includes Gastrectomy Laparoscopic Sleeve ();  section (); and Partial Hysterectomy ().    Objective-Exam     Constitutional:  Ht 1.702 m (5' 7\")   Wt 131.1 kg (289 lb)   BMI 45.26 kg/m    General:  Pleasant and in no acute distress     Psychiatric: alert and oriented X3, mood and affect normal    Counseling     We reviewed the important post op bariatric recommendations:  -eating 3 meals daily  -eating protein first, getting >60gm protein daily  -eating slowly, chewing food well  -avoiding/limiting calorie containing beverages  -drinking water 15-30 " minutes before or after meals  -choosing wheat, not white with breads, crackers, pastas, henrry, bagels, tortillas, rice  -limiting restaurant or cafeteria eating to twice a week or less    We discussed the importance of restorative sleep and stress management in maintaining a healthy weight.  We discussed the National Weight Control Registry healthy weight maintenance strategies and ways to optimize metabolism.  We discussed the importance of physical activity including cardiovascular and strength training in maintaining a healthier weight.    We discussed the importance of life-long vitamin supplementation and life-long  follow-up.    Shana was reminded that, to avoid marginal ulcers she should avoid tobacco at all, alcohol in excess, caffeine in excess, and NSAIDS (unless indicated for cardioprotection or othewise and opposed by a PPI).    Mirtha Combs MD, MD, [facility].  10/1/2021  9:51 AM  Total time spent on the date of this encounter doing: chart review, review of test results, patient visit, physical exam, education, counseling, developing plan of care, and documenting = 35 minutes.      Video-Visit Details    Type of service:  Video Visit    Phone end time 10:15  Originating Location (pt. Location): Home    Distant Location (provider location):  [facility] [place]     Platform used for Video Visit: VTEX/phone

## 2021-10-07 NOTE — PATIENT INSTRUCTIONS
"Bariatric Task List    Fax:  Please fax all paperwork to: [phone] -     Status:  Is patient a candidate for bariatric surgery?:  patient is a candidate for bariatric surgery -     Cleared to schedule surgeon consult?:  not cleared to schedule surgeon consult -     Status:  surgery evaluation in process -     Surgeon: Any -        Insurance: Insurance:  [place]icaid [place] -      Contact insurance to discuss coverage: Needed -       Other:  Please call Celia Melgoza at [phone] to discuss insurance requirements for revision -        Patient Info: Initial Weight:  289 lb -     Date of Initial Weight/Height:  10/1/2021 -     Required Weight Loss:  no weight gain -     Surgery Type:  bariatric revision - has a sleeve - interested in a ROB      Dietician Visits: Structured weight loss required by insurance?:    - Celia Melgoza to help determine   Dietician Visit 1:    -     Dietician Visit 2:    -     Dietician Visit 3:    -     Dietician Visit 4:    -     Dietician Visit 5:    -     Dietician Visit 6:    -     Dietician Visit additional:    -     Clearance from dietician to see surgeon?:  Needed -     Dietician Notes:    -        Psychological Evaluation: Psych eval:  Needed -     Other:    -        Lab Work: Complete Blood Count:  Needed -     Comprehensive Metabolic Panel:  Needed -     Vitamin D:  Needed -     PTH:  Needed -     Hgb A1c:  Needed - ordered with initials    Lipids: Needed -      TSH ([facility], [facility], [place] MA): Needed -       Ferritin: Needed -       Folate: Needed -     Thiamine: Needed -     Vitamin A: Needed -     Vitamin B12: Needed -     Zinc: Needed -        Consults/ Clearance Sleep Medicine:  Completed - uses CPAP - bring to the hospital with you   Other:    -        PCP: Establish care with PCP:  Completed -        Patient Education:  Information Session:  Needed - seminar link sent to the patient   Given \"Making your decision\" handout?:  Yes -     Given \"A Roadmap to you Weight Loss Surgery\" handout?: " Yes -     Given support group information?:    -  Yes   Attended support group?:  Needed -     Support plan in place?:  Completed -        Additional Surgery Requirements: Review Coag plan:  Completed - standard   Birth control plan:    - partial hysterectomy   Gallstone prevention plan (Actigall for 6 months postop): Needed -     Other:   -     Other:   -        Final Tasks:  Before surgery online class:  Needed - 3 week prior with RD and RN   After surgery online class:  Needed - 1 week prior with RD   History and Physical per clinic:   -  Needed -  Will order within 1 month of surgery.   Final labs per clinic: Needed -  Will order within 1 month of surgery.   Chest xray per clinic:   -  Needed -  Will order within 1 month of surgery.   Electrocardiogram (ECG) per clinic:   -  Needed -  Will order within 1 month of surgery.   Other:   -        Notes:   -

## 2021-10-15 ENCOUNTER — DOCUMENTATION ONLY (OUTPATIENT)
Dept: SURGERY | Facility: CLINIC | Age: 56
End: 2021-10-15

## 2021-10-15 NOTE — PROGRESS NOTES
I spoke with Shana this morning about going forward with a revision.  She had pursued the revision route back in 2019 and canceled surgery at that time stating she wasn't ready.  She had BC and MA at the time.  Now she only has MA.  I have tried digging and reaching out to DHS and have found nothing on requirements for revision.  A first surgery has no specific SWL requirement.  So we're going to go with that for now, submit and if it requires more, then we will deal with that as is comes.  She's ok with that.  I have her set up with Flako and the dietician.  She understands she is starting over from the beginning.  She is so sweet!

## 2021-11-03 ENCOUNTER — VIRTUAL VISIT (OUTPATIENT)
Dept: SURGERY | Facility: CLINIC | Age: 56
End: 2021-11-03
Payer: COMMERCIAL

## 2021-11-03 VITALS — HEIGHT: 67 IN | BODY MASS INDEX: 45.36 KG/M2 | WEIGHT: 289 LBS

## 2021-11-03 DIAGNOSIS — Z71.3 NUTRITIONAL COUNSELING: ICD-10-CM

## 2021-11-03 DIAGNOSIS — E11.9 TYPE 2 DIABETES MELLITUS WITHOUT COMPLICATION, WITHOUT LONG-TERM CURRENT USE OF INSULIN (H): Primary | ICD-10-CM

## 2021-11-03 DIAGNOSIS — E66.01 OBESITY, CLASS III, BMI 40-49.9 (MORBID OBESITY) (H): ICD-10-CM

## 2021-11-03 DIAGNOSIS — Z98.84 BARIATRIC SURGERY STATUS: ICD-10-CM

## 2021-11-03 PROCEDURE — 97802 MEDICAL NUTRITION INDIV IN: CPT | Mod: 95 | Performed by: DIETITIAN, REGISTERED

## 2021-11-03 ASSESSMENT — MIFFLIN-ST. JEOR: SCORE: 1943.53

## 2021-11-03 NOTE — LETTER
11/3/2021         RE: Shana Ventura  [address] [facility]   [address]  [place] [address]        Dear Colleague,    Thank you for referring your patient, Shana Ventura, to the [facility]ARE [place]. Please see a copy of my visit note below.    Shana Ventura is a 56 year old who is being evaluated via a billable video visit.      How would you like to obtain your AVS? MyChart  If the video visit is dropped, the invitation should be resent by: Send to e-mail at: [email or web address]  Will anyone else be joining your video visit? No      Video Start Time: 12:37 PM      Initial Structured Weight Loss Supervised Diet Evaluation     Assessment:  Shana is being seen today for initial RD nutritional evaluation. Patient has been unsuccessful with non-surgical weight loss methods and is interested in bariatric surgery. Today we reviewed current eating habits and level of physical activity, and instructed on the changes that are required for successful bariatric outcomes.    Surgery of interest per pt: Revision.  +hoping to achieve more weight loss- feels as though she is controlling diabetes well  +feels as though she has continued the surgical habits for the most part    Workflow review:  Support Group: Not completed.  Psychology:scheduled.  Lab work:Not completed.  SWL:No       Weight goal: At or below initial.    Anthropometrics:  Pt's weight is 289 lbs 0 oz  Initial weight: 289lb  Weight change: none  BMI: Body mass index is 45.26 kg/m .   Ideal body weight: 61.6 kg (135 lb 12.9 oz)  Adjusted ideal body weight: 89.4 kg (197 lb 1.3 oz)  Estimated RMR ([place]-St Jeor equation):  1908 kcals x 1.2 (sedentary) = 2289 kcals (for weight maintenance)    Medical History:  Patient Active Problem List   Diagnosis     GERD (gastroesophageal reflux disease)     Arthritis     Morbid obesity with BMI of 45.0-49.9, adult (H)     Hypertension     Morbid obesity (H)     Knee  pain     Diabetes mellitus, type 2 (H)      Diabetes: trulicity once a week- testing every now and then-   HbA1c:  No results found for: HGBA1C  Biggest struggle with weight loss: lack of activity, drinking pop, states she has cravings for sweets or salt every once in a while  +using air fryer now- replacing fried foods  +sometimes struggling with three meals per day    Nutrition History  Food allergies/intolerances/cultural or religous food customs: No sometimes milk and cheese  Diet history: LSG in 2011  Vitamins/Mineral currently taking: bariatric vitamins      Diet Recall/Time  Breakfast: protein shake- will sometimes not eat anything until dinner  Lunch: none  Dinner: air fried chicken thighs and salad     Typical Snacks: none    Overnight eating: No    Eating out: maybe 1 per week    Beverages  Drinking pop- down to 1 every other day- used to do 2-3 cans per day  +not getting a lot of water  +cup of coffee every once in a while    Exercise  Arthritis in knees- goal is to lose weight in order to not have a knee replacement    Nutrition Diagnosis (PES statement)  (NI-1.3) Excessive energy intake related to Food and nutrition related knowledge deficit concerning excessive energy/oral intake as evidenced by Intake of high caloric density foods/beverages (juice, soda, alcohol) at meals and/or snacks; large portions; frequent grazing; Estimated intake that exceeds estimated daily energy intake; Binge eating patterns; Frequent excessive fast food or restaurant intake; and BMI 45.26     Intervention    Nutrition Education:   1. Provided general overview of diet and lifestyle modifications needed to be a deemed a safe candidate for bariatric surgery.     Food/Nutrient Delivery:  2. Educated patient on eating three meals, with cutting out snacking.  3. Educated patient on using a protein powder drink as a meal replacement and/or supplement after bariatric surgery.   4. Discussed importance of adequate hydration  after surgery, with goal of at least 64 oz of fluids/day.  5. Addressed avoiding all carbonated, caffeinated and sweetened drinks to prepare for bariatric surgery.     Nutrition Counselin. Mindful eating techniques: Encouraged slow meal pace, chewing foods to applesauce consistency for 20-30 minutes/meal.   7. Discussed  fluids 30 minutes before, during, and after meal to prevent dumping syndrome and discomfort post bariatric surgery.     Instructions/Goals:     1. Start implementing bariatric surgery lifestyle modifications.  2. 64oz water per day  3. Eat three meals per day      Handouts Provided:   Protein sources  Support group information    Monitor/Evaluation:  Pt. s target weight:no gain from initial visit, patient verbalized understanding.     Plan for next visit:   Bariatric plate. Give food journal homework.  Educate on dumping syndrome and reading food labels.      Video-Visit Details    Type of service:  Video Visit    Video End Time:1:00p    Originating Location (pt. Location): Home    Distant Location (provider location):  [facility]ERY CL[facility]ATRICS [facility] [place]     Platform used for Video Visit: Dawna MANZO RD        Again, thank you for allowing me to participate in the care of your patient.        Sincerely,        DANY MANZO RD

## 2021-11-03 NOTE — PROGRESS NOTES
Shana Ventura is a 56 year old who is being evaluated via a billable video visit.      How would you like to obtain your AVS? MyChart  If the video visit is dropped, the invitation should be resent by: Send to e-mail at: [email or web address]  Will anyone else be joining your video visit? No      Video Start Time: 12:37 PM      Initial Structured Weight Loss Supervised Diet Evaluation     Assessment:  Shana is being seen today for initial RD nutritional evaluation. Patient has been unsuccessful with non-surgical weight loss methods and is interested in bariatric surgery. Today we reviewed current eating habits and level of physical activity, and instructed on the changes that are required for successful bariatric outcomes.    Surgery of interest per pt: Revision.  +hoping to achieve more weight loss- feels as though she is controlling diabetes well  +feels as though she has continued the surgical habits for the most part    Workflow review:  Support Group: Not completed.  Psychology:scheduled.  Lab work:Not completed.  SWL:No       Weight goal: At or below initial.    Anthropometrics:  Pt's weight is 289 lbs 0 oz  Initial weight: 289lb  Weight change: none  BMI: Body mass index is 45.26 kg/m .   Ideal body weight: 61.6 kg (135 lb 12.9 oz)  Adjusted ideal body weight: 89.4 kg (197 lb 1.3 oz)  Estimated RMR ([place]-St Jeor equation):  1908 kcals x 1.2 (sedentary) = 2289 kcals (for weight maintenance)    Medical History:  Patient Active Problem List   Diagnosis     GERD (gastroesophageal reflux disease)     Arthritis     Morbid obesity with BMI of 45.0-49.9, adult (H)     Hypertension     Morbid obesity (H)     Knee pain     Diabetes mellitus, type 2 (H)      Diabetes: trulicity once a week- testing every now and then-   HbA1c:  No results found for: HGBA1C  Biggest struggle with weight loss: lack of activity, drinking pop, states she has cravings for sweets or salt every once in a while  +using air  nirmal now- replacing fried foods  +sometimes struggling with three meals per day    Nutrition History  Food allergies/intolerances/cultural or religous food customs: No sometimes milk and cheese  Diet history: LSG in   Vitamins/Mineral currently taking: bariatric vitamins      Diet Recall/Time  Breakfast: protein shake- will sometimes not eat anything until dinner  Lunch: none  Dinner: air fried chicken thighs and salad     Typical Snacks: none    Overnight eating: No    Eating out: maybe 1 per week    Beverages  Drinking pop- down to 1 every other day- used to do 2-3 cans per day  +not getting a lot of water  +cup of coffee every once in a while    Exercise  Arthritis in knees- goal is to lose weight in order to not have a knee replacement    Nutrition Diagnosis (PES statement)  (NI-1.3) Excessive energy intake related to Food and nutrition related knowledge deficit concerning excessive energy/oral intake as evidenced by Intake of high caloric density foods/beverages (juice, soda, alcohol) at meals and/or snacks; large portions; frequent grazing; Estimated intake that exceeds estimated daily energy intake; Binge eating patterns; Frequent excessive fast food or restaurant intake; and BMI 45.26     Intervention    Nutrition Education:   1. Provided general overview of diet and lifestyle modifications needed to be a deemed a safe candidate for bariatric surgery.     Food/Nutrient Delivery:  2. Educated patient on eating three meals, with cutting out snacking.  3. Educated patient on using a protein powder drink as a meal replacement and/or supplement after bariatric surgery.   4. Discussed importance of adequate hydration after surgery, with goal of at least 64 oz of fluids/day.  5. Addressed avoiding all carbonated, caffeinated and sweetened drinks to prepare for bariatric surgery.     Nutrition Counselin. Mindful eating techniques: Encouraged slow meal pace, chewing foods to applesauce consistency for 20-30  minutes/meal.   7. Discussed  fluids 30 minutes before, during, and after meal to prevent dumping syndrome and discomfort post bariatric surgery.     Instructions/Goals:     1. Start implementing bariatric surgery lifestyle modifications.  2. 64oz water per day  3. Eat three meals per day      Handouts Provided:   Protein sources  Support group information    Monitor/Evaluation:  Pt. s target weight:no gain from initial visit, patient verbalized understanding.     Plan for next visit:   Bariatric plate. Give food journal homework.  Educate on dumping syndrome and reading food labels.      Video-Visit Details    Type of service:  Video Visit    Video End Time:1:00p    Originating Location (pt. Location): Home    Distant Location (provider location):  [facility] SURGERY CLINIC AND BAR[facility]ATRICS CARE [place]     Platform used for Video Visit: Dawna MANZO RD

## 2021-11-22 ENCOUNTER — VIRTUAL VISIT (OUTPATIENT)
Dept: SURGERY | Facility: CLINIC | Age: 56
End: 2021-11-22
Payer: COMMERCIAL

## 2021-11-22 DIAGNOSIS — E66.01 MORBID OBESITY (H): Primary | ICD-10-CM

## 2021-11-22 NOTE — PROGRESS NOTES
Video-Visit Details    Type of service:  Video Visit    Video Start Time (time video started): 10:45 AM    Video End Time (time video stopped): 11:30 AM    Originating Location (pt. Location): Other work    Distant Location (provider location):  Home office     Mode of Communication:  Video Conference via [facility]    Physician has received verbal consent for a Video Visit from the patient? Yes    Shana returned to session as she was not ready to proceed for revision surgery in 2019 (she got cold feet). She is now feels more psychologically ready and less anxious. She still struggles with pain and wants to increase her activity level. She has good knowledge of surgery and good support. She will follow up and complete psychological testing. F50.9; E66.01    Flako Telles, PhD

## 2021-11-22 NOTE — LETTER
11/22/2021         RE: Shana Ventura  [address] [facility]   [address]  [place] [address]        Dear Colleague,    Thank you for referring your patient, Shana Ventura, to the [facility] SURGERY CLINIC [facility] BARIATRICS CARE [place]. Please see a copy of my visit note below.    Video-Visit Details    Type of service:  Video Visit    Video Start Time (time video started): 10:45 AM    Video End Time (time video stopped): 11:30 AM    Originating Location (pt. Location): Other work    Distant Location (provider location):  Home office     Mode of Communication:  Video Conference via [facility]    Physician has received verbal consent for a Video Visit from the patient? Yes    Shana returned to session as she was not ready to proceed for revision surgery in 2019 (she got cold feet). She is now feels more psychologically ready and less anxious. She still struggles with pain and wants to increase her activity level. She has good knowledge of surgery and good support. She will follow up and complete psychological testing. F50.9; E66.01    Flako Telles, PhD            Again, thank you for allowing me to participate in the care of your patient.        Sincerely,        Flako Telles, PhD

## 2021-12-03 ENCOUNTER — VIRTUAL VISIT (OUTPATIENT)
Dept: SURGERY | Facility: CLINIC | Age: 56
End: 2021-12-03
Payer: MEDICAID

## 2021-12-03 VITALS — WEIGHT: 289 LBS | BODY MASS INDEX: 45.26 KG/M2

## 2021-12-03 DIAGNOSIS — E66.01 OBESITY, CLASS III, BMI 40-49.9 (MORBID OBESITY) (H): ICD-10-CM

## 2021-12-03 DIAGNOSIS — Z71.3 NUTRITIONAL COUNSELING: ICD-10-CM

## 2021-12-03 DIAGNOSIS — E11.9 TYPE 2 DIABETES MELLITUS WITHOUT COMPLICATION, WITHOUT LONG-TERM CURRENT USE OF INSULIN (H): Primary | ICD-10-CM

## 2021-12-03 PROCEDURE — 98966 PH1 ASSMT&MGMT NQHP 5-10: CPT | Performed by: DIETITIAN, REGISTERED

## 2021-12-03 NOTE — PROGRESS NOTES
Shana Ventura is a 56 year old who is being evaluated via a billable telephone visit.      What phone number would you like to be contacted at? [phone]  How would you like to obtain your AVS? MyChart      Follow Up Surgical Weight Loss Supervised Diet Evaluation    Assessment:  Pt. is being seen today for a follow up RD nutritional evaluation. Pt. has been unsuccessful with non-surgical weight loss methods and is interested in bariatric surgery. Today we reviewed current eating habits and level of physical activity, and instructed on the changes that are required for successful bariatric outcomes.    Surgery of interest per pt: Revision.    Workflow review:  Support Group: Not completed.  Psychology:In progress.  Lab work:Not completed.  SWL:No       Weight goal: At or below initial.    Anthropometrics:  Pt's weight is 289 lbs 0 oz  BMI: Body mass index is 45.26 kg/m .   Ideal body weight: 61.6 kg (135 lb 12.9 oz)  Adjusted ideal body weight: 89.4 kg (197 lb 1.3 oz)  Estimated RMR ([place]-St Jeor equation):  1908 kcals x 1.2 (sedentary) = 2289 kcals (for weight maintenance)    Medical History:  Patient Active Problem List   Diagnosis     GERD (gastroesophageal reflux disease)     Arthritis     Morbid obesity with BMI of 45.0-49.9, adult (H)     Hypertension     Morbid obesity (H)     Knee pain     Diabetes mellitus, type 2 (H)      Diabetes: yes- trulicity once a week, testing occasionally  HbA1c:  No results found for: HGBA1C    Progress over past month: still struggling with getting enough water in- only drinking water, but is having a hard time getting it in   +having a shake or something in the morning  -sister passed away in the past month, so has not been as structured     Beverages  1-2 bottles water per day (24-48oz)    Exercise  Not discussed    PES statement:   (NI-1.3) Excessive energy intake related to Food and nutrition related knowledge deficit concerning excessive energy/oral intake as  evidenced by Intake of high caloric density foods/beverages (juice, soda, alcohol) at meals and/or snacks; large portions; frequent grazing; estimated intake that exceeds estimated daily energy intake; binge eating patterns; frequent fast food or restaurant intake; and BMI 45.26     Intervention    Nutrition Education:   1. Provided general overview of diet and lifestyle modifications needed to be a deemed a safe candidate for bariatric surgery.   Food/Nutrient Delivery:  2. Educated patient on eating three meals, with cutting out snacking.  3. Educated patient on using a protein powder drink as a meal replacement and/or supplement after bariatric surgery.   4. Discussed importance of adequate hydration after surgery, with goal of at least 64 oz of fluids/day.  5. Addressed avoiding all carbonated, caffeinated and sweetened drinks to prepare for bariatric surgery.     Nutrition Counselin. Mindful eating techniques: Encouraged slow meal pace, chewing foods to applesauce consistency for 20-30 minutes/meal.     Instructions/Goals:     1. Continue implementing bariatric surgery lifestyle modifications.  2. 3 bottles water- one before lunch, one before dinner and one before bed  3. Drink protein at breakfast    Monitor/Evaluation:  Pt. s target weight:  no gain from initial visit, pt. verbalized understanding.       Plan for next visit:   Bariatric plate.   Educate on dumping syndrome and reading food labels.  (Final supervised diet visit with RD) pre/post-op  diet progression, give review of surgery process.      Phone call duration: 10 minutes      DANY MANZO RD

## 2021-12-03 NOTE — LETTER
12/3/2021         RE: Shana Ventura  [address] [facility]   [address]  [place] [address]        Dear Colleague,    Thank you for referring your patient, Shana Ventura, to the [facility] SURGERY CLINIC [facility] BARIATRICS CARE [place]. Please see a copy of my visit note below.    Shana Ventura is a 56 year old who is being evaluated via a billable telephone visit.      What phone number would you like to be contacted at? [phone]  How would you like to obtain your AVS? MyChart      Follow Up Surgical Weight Loss Supervised Diet Evaluation    Assessment:  Pt. is being seen today for a follow up RD nutritional evaluation. Pt. has been unsuccessful with non-surgical weight loss methods and is interested in bariatric surgery. Today we reviewed current eating habits and level of physical activity, and instructed on the changes that are required for successful bariatric outcomes.    Surgery of interest per pt: Revision.    Workflow review:  Support Group: Not completed.  Psychology:In progress.  Lab work:Not completed.  SWL:No       Weight goal: At or below initial.    Anthropometrics:  Pt's weight is 289 lbs 0 oz  BMI: Body mass index is 45.26 kg/m .   Ideal body weight: 61.6 kg (135 lb 12.9 oz)  Adjusted ideal body weight: 89.4 kg (197 lb 1.3 oz)  Estimated RMR ([place]-St Jeor equation):  1908 kcals x 1.2 (sedentary) = 2289 kcals (for weight maintenance)    Medical History:  Patient Active Problem List   Diagnosis     GERD (gastroesophageal reflux disease)     Arthritis     Morbid obesity with BMI of 45.0-49.9, adult (H)     Hypertension     Morbid obesity (H)     Knee pain     Diabetes mellitus, type 2 (H)      Diabetes: yes- trulicity once a week, testing occasionally  HbA1c:  No results found for: HGBA1C    Progress over past month: still struggling with getting enough water in- only drinking water, but is having a hard time getting it in   +having a shake or something in the morning  -sister  passed away in the past month, so has not been as structured     Beverages  1-2 bottles water per day (24-48oz)    Exercise  Not discussed    PES statement:   (NI-1.3) Excessive energy intake related to Food and nutrition related knowledge deficit concerning excessive energy/oral intake as evidenced by Intake of high caloric density foods/beverages (juice, soda, alcohol) at meals and/or snacks; large portions; frequent grazing; estimated intake that exceeds estimated daily energy intake; binge eating patterns; frequent fast food or restaurant intake; and BMI 45.26     Intervention    Nutrition Education:   1. Provided general overview of diet and lifestyle modifications needed to be a deemed a safe candidate for bariatric surgery.   Food/Nutrient Delivery:  2. Educated patient on eating three meals, with cutting out snacking.  3. Educated patient on using a protein powder drink as a meal replacement and/or supplement after bariatric surgery.   4. Discussed importance of adequate hydration after surgery, with goal of at least 64 oz of fluids/day.  5. Addressed avoiding all carbonated, caffeinated and sweetened drinks to prepare for bariatric surgery.     Nutrition Counselin. Mindful eating techniques: Encouraged slow meal pace, chewing foods to applesauce consistency for 20-30 minutes/meal.     Instructions/Goals:     1. Continue implementing bariatric surgery lifestyle modifications.  2. 3 bottles water- one before lunch, one before dinner and one before bed  3. Drink protein at breakfast    Monitor/Evaluation:  Pt. s target weight:  no gain from initial visit, pt. verbalized understanding.       Plan for next visit:   Bariatric plate.   Educate on dumping syndrome and reading food labels.  (Final supervised diet visit with RD) pre/post-op  diet progression, give review of surgery process.      Phone call duration: 10 minutes      DANY MANZO RD          Again, thank you for allowing me to participate in the  care of your patient.        Sincerely,        DANY MANZO RD

## 2021-12-06 ENCOUNTER — VIRTUAL VISIT (OUTPATIENT)
Dept: SURGERY | Facility: CLINIC | Age: 56
End: 2021-12-06
Payer: COMMERCIAL

## 2021-12-06 ENCOUNTER — TRANSFERRED RECORDS (OUTPATIENT)
Dept: HEALTH INFORMATION MANAGEMENT | Facility: CLINIC | Age: 56
End: 2021-12-06
Payer: COMMERCIAL

## 2021-12-06 DIAGNOSIS — E66.01 MORBID OBESITY (H): Primary | ICD-10-CM

## 2021-12-06 NOTE — PROGRESS NOTES
Video-Visit Details    Type of service:  Video Visit    Video Start Time (time video started): 11:30 AM    Video End Time (time video stopped): 12:08 PM    Originating Location (pt. Location): Other work    Distant Location (provider location):  Home office     Mode of Communication:  Video Conference via [email or web address]    Physician has received verbal consent for a Video Visit from the patient? Yes    Shana has continued to make quality changes in eating and lifestyle and is ready to follow through with surgery. A report will be sent to Celia Melgoza. F50.9; E66.01    Flako Telles, PhD

## 2021-12-06 NOTE — LETTER
12/6/2021         RE: Shana Ventura  [address] [facility]   [address]  [facility] [address]        Dear Colleague,    Thank you for referring your patient, Shana Ventura, to the [facility] SURGERY [facility]ATRICS CARE [place]. Please see a copy of my visit note below.    Video-Visit Details    Type of service:  Video Visit    Video Start Time (time video started): 11:30 AM    Video End Time (time video stopped): 12:08 PM    Originating Location (pt. Location): Other work    Distant Location (provider location):  Home office     Mode of Communication:  Video Conference via [email or web address]    Physician has received verbal consent for a Video Visit from the patient? Yes    Shana has continued to make quality changes in eating and lifestyle and is ready to follow through with surgery. A report will be sent to Celia Melgoza. F50.9; E66.01    Flako Telles, PhD            Again, thank you for allowing me to participate in the care of your patient.        Sincerely,        Flako Telles, PhD

## 2021-12-13 DIAGNOSIS — Z98.84 S/P BARIATRIC SURGERY: ICD-10-CM

## 2021-12-13 DIAGNOSIS — K91.2 POSTOPERATIVE MALABSORPTION: Primary | ICD-10-CM

## 2021-12-13 DIAGNOSIS — K90.9 INTESTINAL MALABSORPTION, UNSPECIFIED: ICD-10-CM

## 2021-12-13 RX ORDER — CYANOCOBALAMIN 500 UG/1
1 SPRAY NASAL WEEKLY
Qty: 12 EACH | Refills: 3 | Status: SHIPPED | OUTPATIENT
Start: 2021-12-13 | End: 2022-03-13

## 2022-01-06 ENCOUNTER — VIRTUAL VISIT (OUTPATIENT)
Dept: SURGERY | Facility: CLINIC | Age: 57
End: 2022-01-06
Payer: COMMERCIAL

## 2022-01-06 ENCOUNTER — MYC MEDICAL ADVICE (OUTPATIENT)
Dept: SURGERY | Facility: CLINIC | Age: 57
End: 2022-01-06

## 2022-01-06 VITALS — HEIGHT: 67 IN | WEIGHT: 293 LBS | BODY MASS INDEX: 45.99 KG/M2

## 2022-01-06 DIAGNOSIS — E11.9 TYPE 2 DIABETES MELLITUS WITHOUT COMPLICATION, WITHOUT LONG-TERM CURRENT USE OF INSULIN (H): Primary | ICD-10-CM

## 2022-01-06 DIAGNOSIS — E66.01 OBESITY, CLASS III, BMI 40-49.9 (MORBID OBESITY) (H): ICD-10-CM

## 2022-01-06 DIAGNOSIS — Z98.84 BARIATRIC SURGERY STATUS: ICD-10-CM

## 2022-01-06 DIAGNOSIS — Z71.3 NUTRITIONAL COUNSELING: ICD-10-CM

## 2022-01-06 PROCEDURE — 97803 MED NUTRITION INDIV SUBSEQ: CPT | Mod: 95 | Performed by: DIETITIAN, REGISTERED

## 2022-01-06 ASSESSMENT — MIFFLIN-ST. JEOR: SCORE: 1979.81

## 2022-01-06 NOTE — PROGRESS NOTES
Shana Ventura is a 56 year old who is being evaluated via a billable video visit.      How would you like to obtain your AVS? MyChart  If the video visit is dropped, the invitation should be resent by: Send to e-mail at: [email or web address]  Will anyone else be joining your video visit? No      Video Start Time: 7:35 AM      Follow Up Surgical Weight Loss Supervised Diet Evaluation    Assessment:  Pt. is being seen today for a follow up RD nutritional evaluation. Pt. has been unsuccessful with non-surgical weight loss methods and is interested in bariatric surgery. Today we reviewed current eating habits and level of physical activity, and instructed on the changes that are required for successful bariatric outcomes.    Surgery of interest per pt: Revision.    Workflow review:  Support Group: will attend on the 11th.  Psychology:Completed.  Lab work:Not completed.  SWL:No   No sleep study      Weight goal: At or below initial.    Anthropometrics:  Pt's weight is 297 lbs 0 oz  Initial weight: 289 lb  Weight change: up 8lb- this might not be accurate, will weight self at [facility] when getting labs done and let me know what that weight is  BMI: Body mass index is 46.52 kg/m .   Ideal body weight: 61.6 kg (135 lb 12.9 oz)  Adjusted ideal body weight: 89.4 kg (197 lb 1.3 oz)  Estimated RMR ([facility] equation):  1908 kcals x 1.2 (sedentary) = 2289 kcals (for weight maintenance)    Medical History:  Patient Active Problem List   Diagnosis     GERD (gastroesophageal reflux disease)     Arthritis     Morbid obesity with BMI of 45.0-49.9, adult (H)     Hypertension     Morbid obesity (H)     Knee pain     Diabetes mellitus, type 2 (H)      Diabetes: not checking blood glucose- states she doesn't have any issue with diabetes  HbA1c:  No results found for: HGBA1C    Progress over past month: her and  making some changes this week- meat and veggies for dinner- cutting down on starchy foods  + is  supportive and making changes with her    Diet Recall/Time  Breakfast: protein shake (atkins)  Lunch: sloppy maricel with cheese and 1 piece of bread  Dinner: protein and veggie    Typical Snacks: packing veggies for snacks    Meal duration: 20-30 minutes- eating and talking with  at dinner     fluids by 30 minutes before, during meal, and waiting 30 minutes after meal before drinking fluids: Yes- does not bring liquids to the table    Beverages  Has been increasing water intake- states she is getting about 64oz- not drinking soda daily- maybe 3-4 per week- has only 4 left in the fridge and plans to not buy any more after this    Exercise  Would like to get gym membership with a pool- hoping to do water exercise    PES statement:   (NI-1.3) Excessive energy intake related to Food and nutrition related knowledge deficit concerning excessive energy/oral intake as evidenced by Intake of high caloric density foods/beverages (juice, soda, alcohol) at meals and/or snacks; large portions; frequent grazing; estimated intake that exceeds estimated daily energy intake; binge eating patterns; frequent fast food or restaurant intake; and BMI 46.52     Intervention    Nutrition Education:   1. Provided general overview of diet and lifestyle modifications needed to be a deemed a safe candidate for bariatric surgery.     Food/Nutrient Delivery:  2. Educated patient on eating three meals, with cutting out snacking.  3. Bariatric Plate: Patient and I discussed the importance of including a lean protein source (20-30 grams/meal), vegetables (included at lunch and dinner), one serving (15g) of carbohydrate, and limited added fat (1 tb/day) at each meal.   4. Educated patient on using a protein powder drink as a meal replacement and/or supplement after bariatric surgery.   5. Discussed importance of adequate hydration after surgery, with goal of at least 64 oz of fluids/day.  6. Addressed avoiding all carbonated,  caffeinated and sweetened drinks to prepare for bariatric surgery.     Nutrition Counselin. Mindful eating techniques: Encouraged slow meal pace, chewing foods to applesauce consistency for 20-30 minutes/meal.   8. Discussed  fluids 30 minutes before, during, and after meal to prevent dumping syndrome and discomfort post bariatric surgery.   9. Discussed pre/post operative diet progression, post op vitamin regimen, gave review of surgery process.     Instructions/Goals:     1. Continue implementing bariatric surgery lifestyle modifications.  2. Eliminate soda    Handouts Provided:   Seminar link    Monitor/Evaluation:  Pt. s target weight:  no gain from initial visit, pt. verbalized understanding.     Pt has completed all nutrition requirements and is well-informed of the dietary and physical activity requirements that are necessary for successful bariatric outcomes. This pt is an appropriate candidate for surgery from a nutrition standpoint at this time. The patient understands that surgery is a tool, not a cure, and post operative follow up is essential.     Plan for next visit:   Check in in a month      Video-Visit Details    Type of service:  Video Visit    Video End Time:8:00a    Originating Location (pt. Location): Home    Distant Location (provider location):  [facility] SURGERY CLINIC AND BARIATRICS CARE [place]     Platform used for Video Visit: Dawna MANZO RD

## 2022-01-06 NOTE — LETTER
1/6/2022         RE: Shana Ventura  [address] [facility]   [address]  [facility] [address]        Dear Colleague,    Thank you for referring your patient, Shana Ventura, to the [facility] SURGERY CLINIC AND BARIATRICS CARE [place]. Please see a copy of my visit note below.    Shana Ventura is a 56 year old who is being evaluated via a billable video visit.      How would you like to obtain your AVS? MyChart  If the video visit is dropped, the invitation should be resent by: Send to e-mail at: [email or web address]  Will anyone else be joining your video visit? No      Video Start Time: 7:35 AM      Follow Up Surgical Weight Loss Supervised Diet Evaluation    Assessment:  Pt. is being seen today for a follow up RD nutritional evaluation. Pt. has been unsuccessful with non-surgical weight loss methods and is interested in bariatric surgery. Today we reviewed current eating habits and level of physical activity, and instructed on the changes that are required for successful bariatric outcomes.    Surgery of interest per pt: Revision.    Workflow review:  Support Group: will attend on the 11th.  Psychology:Completed.  Lab work:Not completed.  SWL:No   No sleep study      Weight goal: At or below initial.    Anthropometrics:  Pt's weight is 297 lbs 0 oz  Initial weight: 289 lb  Weight change: up 8lb- this might not be accurate, will weight self at [place] when getting labs done and let me know what that weight is  BMI: Body mass index is 46.52 kg/m .   Ideal body weight: 61.6 kg (135 lb 12.9 oz)  Adjusted ideal body weight: 89.4 kg (197 lb 1.3 oz)  Estimated RMR ([place]-[facility] Jeor equation):  1908 kcals x 1.2 (sedentary) = 2289 kcals (for weight maintenance)    Medical History:  Patient Active Problem List   Diagnosis     GERD (gastroesophageal reflux disease)     Arthritis     Morbid obesity with BMI of 45.0-49.9, adult (H)     Hypertension     Morbid obesity (H)     Knee pain     Diabetes  mellitus, type 2 (H)      Diabetes: not checking blood glucose- states she doesn't have any issue with diabetes  HbA1c:  No results found for: HGBA1C    Progress over past month: her and  making some changes this week- meat and veggies for dinner- cutting down on starchy foods  + is supportive and making changes with her    Diet Recall/Time  Breakfast: protein shake (atkins)  Lunch: sloppy maricel with cheese and 1 piece of bread  Dinner: protein and veggie    Typical Snacks: packing veggies for snacks    Meal duration: 20-30 minutes- eating and talking with  at dinner     fluids by 30 minutes before, during meal, and waiting 30 minutes after meal before drinking fluids: Yes- does not bring liquids to the table    Beverages  Has been increasing water intake- states she is getting about 64oz- not drinking soda daily- maybe 3-4 per week- has only 4 left in the fridge and plans to not buy any more after this    Exercise  Would like to get gym membership with a pool- hoping to do water exercise    PES statement:   (NI-1.3) Excessive energy intake related to Food and nutrition related knowledge deficit concerning excessive energy/oral intake as evidenced by Intake of high caloric density foods/beverages (juice, soda, alcohol) at meals and/or snacks; large portions; frequent grazing; estimated intake that exceeds estimated daily energy intake; binge eating patterns; frequent fast food or restaurant intake; and BMI 46.52     Intervention    Nutrition Education:   1. Provided general overview of diet and lifestyle modifications needed to be a deemed a safe candidate for bariatric surgery.     Food/Nutrient Delivery:  2. Educated patient on eating three meals, with cutting out snacking.  3. Bariatric Plate: Patient and I discussed the importance of including a lean protein source (20-30 grams/meal), vegetables (included at lunch and dinner), one serving (15g) of carbohydrate, and limited added fat  (1 tb/day) at each meal.   4. Educated patient on using a protein powder drink as a meal replacement and/or supplement after bariatric surgery.   5. Discussed importance of adequate hydration after surgery, with goal of at least 64 oz of fluids/day.  6. Addressed avoiding all carbonated, caffeinated and sweetened drinks to prepare for bariatric surgery.     Nutrition Counselin. Mindful eating techniques: Encouraged slow meal pace, chewing foods to applesauce consistency for 20-30 minutes/meal.   8. Discussed  fluids 30 minutes before, during, and after meal to prevent dumping syndrome and discomfort post bariatric surgery.   9. Discussed pre/post operative diet progression, post op vitamin regimen, gave review of surgery process.     Instructions/Goals:     1. Continue implementing bariatric surgery lifestyle modifications.  2. Eliminate soda    Handouts Provided:   Seminar link    Monitor/Evaluation:  Pt. s target weight:  no gain from initial visit, pt. verbalized understanding.     Pt has completed all nutrition requirements and is well-informed of the dietary and physical activity requirements that are necessary for successful bariatric outcomes. This pt is an appropriate candidate for surgery from a nutrition standpoint at this time. The patient understands that surgery is a tool, not a cure, and post operative follow up is essential.     Plan for next visit:   Check in in a month      Video-Visit Details    Type of service:  Video Visit    Video End Time:8:00a    Originating Location (pt. Location): Home    Distant Location (provider location):  [facility] SURGERY CLINIC AND BARIATRICS CARE [place]     Platform used for Video Visit: Dawna MANZO RD          Again, thank you for allowing me to participate in the care of your patient.        Sincerely,        DANY MANZO RD

## 2022-01-06 NOTE — TELEPHONE ENCOUNTER
Shana has been nutritionally cleared for surgery and cleared by [facility]. She will do support group next week, and will do labs at [place]. Once she has those things complete then she will be ready for an AB Consult.  Delfina Delatorre RN

## 2022-01-06 NOTE — Clinical Note
Shana has been nutritionally cleared. She will attend support group on Tuesday, will get labs done at [facility]. She is also checking in with me in a month for accountability.

## 2022-03-11 ENCOUNTER — LAB (OUTPATIENT)
Dept: LAB | Facility: CLINIC | Age: 57
End: 2022-03-11
Payer: MEDICAID

## 2022-03-11 DIAGNOSIS — K91.2 POSTOPERATIVE MALABSORPTION: ICD-10-CM

## 2022-03-11 LAB
ALBUMIN SERPL-MCNC: 3.3 G/DL (ref 3.4–5)
ALP SERPL-CCNC: 118 U/L (ref 40–150)
ALT SERPL W P-5'-P-CCNC: 24 U/L (ref 0–50)
AST SERPL W P-5'-P-CCNC: 18 U/L (ref 0–45)
BILIRUB DIRECT SERPL-MCNC: 0.1 MG/DL (ref 0–0.2)
BILIRUB SERPL-MCNC: 0.4 MG/DL (ref 0.2–1.3)
DEPRECATED CALCIDIOL+CALCIFEROL SERPL-MC: 34 UG/L (ref 20–75)
ERYTHROCYTE [DISTWIDTH] IN BLOOD BY AUTOMATED COUNT: 14 % (ref 10–15)
FERRITIN SERPL-MCNC: 41 NG/ML (ref 8–252)
FOLATE SERPL-MCNC: 17.3 NG/ML
HCT VFR BLD AUTO: 38.2 % (ref 35–47)
HGB BLD-MCNC: 12.5 G/DL (ref 11.7–15.7)
MCH RBC QN AUTO: 29.5 PG (ref 26.5–33)
MCHC RBC AUTO-ENTMCNC: 32.7 G/DL (ref 31.5–36.5)
MCV RBC AUTO: 90 FL (ref 78–100)
PLATELET # BLD AUTO: 201 10E3/UL (ref 150–450)
PROT SERPL-MCNC: 8.4 G/DL (ref 6.8–8.8)
PTH-INTACT SERPL-MCNC: 52 PG/ML (ref 18–80)
RBC # BLD AUTO: 4.24 10E6/UL (ref 3.8–5.2)
TSH SERPL DL<=0.005 MIU/L-ACNC: 2.24 MU/L (ref 0.4–4)
VIT B12 SERPL-MCNC: 944 PG/ML (ref 193–986)
WBC # BLD AUTO: 6.1 10E3/UL (ref 4–11)

## 2022-03-11 PROCEDURE — 84425 ASSAY OF VITAMIN B-1: CPT | Mod: 90

## 2022-03-11 PROCEDURE — 82746 ASSAY OF FOLIC ACID SERUM: CPT

## 2022-03-11 PROCEDURE — 99000 SPECIMEN HANDLING OFFICE-LAB: CPT

## 2022-03-11 PROCEDURE — 85027 COMPLETE CBC AUTOMATED: CPT

## 2022-03-11 PROCEDURE — 84590 ASSAY OF VITAMIN A: CPT | Mod: 90

## 2022-03-11 PROCEDURE — 83970 ASSAY OF PARATHORMONE: CPT

## 2022-03-11 PROCEDURE — 82607 VITAMIN B-12: CPT

## 2022-03-11 PROCEDURE — 82306 VITAMIN D 25 HYDROXY: CPT

## 2022-03-11 PROCEDURE — 36415 COLL VENOUS BLD VENIPUNCTURE: CPT

## 2022-03-11 PROCEDURE — 82728 ASSAY OF FERRITIN: CPT

## 2022-03-11 PROCEDURE — 84630 ASSAY OF ZINC: CPT | Mod: 90

## 2022-03-11 PROCEDURE — 80076 HEPATIC FUNCTION PANEL: CPT

## 2022-03-11 PROCEDURE — 84443 ASSAY THYROID STIM HORMONE: CPT

## 2022-03-17 LAB
ANNOTATION COMMENT IMP: NORMAL
RETINYL PALMITATE SERPL-MCNC: <0.02 MG/L
VIT A SERPL-MCNC: 0.63 MG/L
ZINC SERPL-MCNC: 136.3 UG/DL

## 2022-03-19 LAB — VIT B1 PYROPHOSHATE BLD-SCNC: 79 NMOL/L

## 2022-03-23 ENCOUNTER — TELEPHONE (OUTPATIENT)
Dept: SURGERY | Facility: CLINIC | Age: 57
End: 2022-03-23

## 2022-04-10 ENCOUNTER — APPOINTMENT (OUTPATIENT)
Dept: GENERAL RADIOLOGY | Facility: CLINIC | Age: 57
End: 2022-04-10
Attending: EMERGENCY MEDICINE
Payer: COMMERCIAL

## 2022-04-10 ENCOUNTER — HOSPITAL ENCOUNTER (EMERGENCY)
Facility: CLINIC | Age: 57
Discharge: HOME | End: 2022-04-10
Attending: EMERGENCY MEDICINE | Admitting: EMERGENCY MEDICINE
Payer: COMMERCIAL

## 2022-04-10 ENCOUNTER — TRANSFERRED RECORDS (OUTPATIENT)
Dept: HEALTH INFORMATION MANAGEMENT | Facility: CLINIC | Age: 57
End: 2022-04-10

## 2022-04-10 VITALS
TEMPERATURE: 97.3 F | RESPIRATION RATE: 17 BRPM | OXYGEN SATURATION: 100 % | SYSTOLIC BLOOD PRESSURE: 129 MMHG | HEART RATE: 65 BPM | WEIGHT: 293 LBS | DIASTOLIC BLOOD PRESSURE: 88 MMHG | BODY MASS INDEX: 46.06 KG/M2

## 2022-04-10 DIAGNOSIS — R07.9 CHEST PAIN, UNSPECIFIED TYPE: ICD-10-CM

## 2022-04-10 DIAGNOSIS — R55 SYNCOPE, UNSPECIFIED SYNCOPE TYPE: ICD-10-CM

## 2022-04-10 LAB
ALBUMIN SERPL-MCNC: 3.3 G/DL (ref 3.4–5)
ALP SERPL-CCNC: 96 U/L (ref 40–150)
ALT SERPL W P-5'-P-CCNC: 21 U/L (ref 0–50)
ANION GAP SERPL CALCULATED.3IONS-SCNC: 3 MMOL/L (ref 3–14)
AST SERPL W P-5'-P-CCNC: 20 U/L (ref 0–45)
BASOPHILS # BLD AUTO: 0 10E3/UL (ref 0–0.2)
BASOPHILS NFR BLD AUTO: 0 %
BILIRUB SERPL-MCNC: 0.2 MG/DL (ref 0.2–1.3)
BUN SERPL-MCNC: 14 MG/DL (ref 7–30)
CALCIUM SERPL-MCNC: 9.2 MG/DL (ref 8.5–10.1)
CHLORIDE BLD-SCNC: 107 MMOL/L (ref 94–109)
CO2 SERPL-SCNC: 28 MMOL/L (ref 20–32)
CREAT SERPL-MCNC: 0.92 MG/DL (ref 0.52–1.04)
D DIMER PPP FEU-MCNC: 0.5 UG/ML FEU (ref 0–0.5)
EOSINOPHIL # BLD AUTO: 0.3 10E3/UL (ref 0–0.7)
EOSINOPHIL NFR BLD AUTO: 4 %
ERYTHROCYTE [DISTWIDTH] IN BLOOD BY AUTOMATED COUNT: 14.3 % (ref 10–15)
FLUAV RNA SPEC QL NAA+PROBE: NEGATIVE
FLUBV RNA RESP QL NAA+PROBE: NEGATIVE
GFR SERPL CREATININE-BSD FRML MDRD: 74 ML/MIN/1.73M2
GLUCOSE BLD-MCNC: 98 MG/DL (ref 70–99)
HCT VFR BLD AUTO: 37 % (ref 35–47)
HGB BLD-MCNC: 11.7 G/DL (ref 11.7–15.7)
IMM GRANULOCYTES # BLD: 0 10E3/UL
IMM GRANULOCYTES NFR BLD: 0 %
LIPASE SERPL-CCNC: 107 U/L (ref 73–393)
LYMPHOCYTES # BLD AUTO: 2.2 10E3/UL (ref 0.8–5.3)
LYMPHOCYTES NFR BLD AUTO: 30 %
MCH RBC QN AUTO: 30 PG (ref 26.5–33)
MCHC RBC AUTO-ENTMCNC: 31.6 G/DL (ref 31.5–36.5)
MCV RBC AUTO: 95 FL (ref 78–100)
MONOCYTES # BLD AUTO: 0.4 10E3/UL (ref 0–1.3)
MONOCYTES NFR BLD AUTO: 5 %
NEUTROPHILS # BLD AUTO: 4.4 10E3/UL (ref 1.6–8.3)
NEUTROPHILS NFR BLD AUTO: 61 %
NRBC # BLD AUTO: 0 10E3/UL
NRBC BLD AUTO-RTO: 0 /100
PLATELET # BLD AUTO: 214 10E3/UL (ref 150–450)
POTASSIUM BLD-SCNC: 3.3 MMOL/L (ref 3.4–5.3)
PROT SERPL-MCNC: 7.6 G/DL (ref 6.8–8.8)
RBC # BLD AUTO: 3.9 10E6/UL (ref 3.8–5.2)
RSV RNA SPEC NAA+PROBE: NEGATIVE
SARS-COV-2 RNA RESP QL NAA+PROBE: NEGATIVE
SODIUM SERPL-SCNC: 138 MMOL/L (ref 133–144)
TROPONIN I SERPL HS-MCNC: 7 NG/L
TROPONIN I SERPL HS-MCNC: 7 NG/L
WBC # BLD AUTO: 7.4 10E3/UL (ref 4–11)

## 2022-04-10 PROCEDURE — 258N000003 HC RX IP 258 OP 636: Performed by: EMERGENCY MEDICINE

## 2022-04-10 PROCEDURE — 93005 ELECTROCARDIOGRAM TRACING: CPT

## 2022-04-10 PROCEDURE — 83690 ASSAY OF LIPASE: CPT | Performed by: EMERGENCY MEDICINE

## 2022-04-10 PROCEDURE — 99285 EMERGENCY DEPT VISIT HI MDM: CPT | Mod: 25

## 2022-04-10 PROCEDURE — 96361 HYDRATE IV INFUSION ADD-ON: CPT

## 2022-04-10 PROCEDURE — 80053 COMPREHEN METABOLIC PANEL: CPT | Performed by: EMERGENCY MEDICINE

## 2022-04-10 PROCEDURE — 85379 FIBRIN DEGRADATION QUANT: CPT | Performed by: EMERGENCY MEDICINE

## 2022-04-10 PROCEDURE — 85025 COMPLETE CBC W/AUTO DIFF WBC: CPT | Performed by: EMERGENCY MEDICINE

## 2022-04-10 PROCEDURE — 36415 COLL VENOUS BLD VENIPUNCTURE: CPT | Performed by: EMERGENCY MEDICINE

## 2022-04-10 PROCEDURE — 250N000013 HC RX MED GY IP 250 OP 250 PS 637: Performed by: EMERGENCY MEDICINE

## 2022-04-10 PROCEDURE — 84484 ASSAY OF TROPONIN QUANT: CPT | Performed by: EMERGENCY MEDICINE

## 2022-04-10 PROCEDURE — 250N000011 HC RX IP 250 OP 636: Performed by: EMERGENCY MEDICINE

## 2022-04-10 PROCEDURE — 250N000009 HC RX 250: Performed by: EMERGENCY MEDICINE

## 2022-04-10 PROCEDURE — 87637 SARSCOV2&INF A&B&RSV AMP PRB: CPT | Performed by: EMERGENCY MEDICINE

## 2022-04-10 PROCEDURE — 82040 ASSAY OF SERUM ALBUMIN: CPT | Performed by: EMERGENCY MEDICINE

## 2022-04-10 PROCEDURE — 96375 TX/PRO/DX INJ NEW DRUG ADDON: CPT

## 2022-04-10 PROCEDURE — 71046 X-RAY EXAM CHEST 2 VIEWS: CPT

## 2022-04-10 PROCEDURE — 96374 THER/PROPH/DIAG INJ IV PUSH: CPT

## 2022-04-10 RX ORDER — ACETAMINOPHEN 500 MG
1000 TABLET ORAL ONCE
Status: COMPLETED | OUTPATIENT
Start: 2022-04-10 | End: 2022-04-10

## 2022-04-10 RX ORDER — NITROGLYCERIN 0.4 MG/1
0.4 TABLET SUBLINGUAL EVERY 5 MIN PRN
Status: DISCONTINUED | OUTPATIENT
Start: 2022-04-10 | End: 2022-04-10 | Stop reason: HOSPADM

## 2022-04-10 RX ORDER — POTASSIUM CHLORIDE 1500 MG/1
40 TABLET, EXTENDED RELEASE ORAL ONCE
Status: COMPLETED | OUTPATIENT
Start: 2022-04-10 | End: 2022-04-10

## 2022-04-10 RX ORDER — ASPIRIN 81 MG/1
324 TABLET, CHEWABLE ORAL ONCE
Status: COMPLETED | OUTPATIENT
Start: 2022-04-10 | End: 2022-04-10

## 2022-04-10 RX ORDER — ONDANSETRON 2 MG/ML
4 INJECTION INTRAMUSCULAR; INTRAVENOUS EVERY 30 MIN PRN
Status: DISCONTINUED | OUTPATIENT
Start: 2022-04-10 | End: 2022-04-10 | Stop reason: HOSPADM

## 2022-04-10 RX ORDER — KETOROLAC TROMETHAMINE 15 MG/ML
15 INJECTION, SOLUTION INTRAMUSCULAR; INTRAVENOUS ONCE
Status: COMPLETED | OUTPATIENT
Start: 2022-04-10 | End: 2022-04-10

## 2022-04-10 RX ADMIN — ONDANSETRON 4 MG: 2 INJECTION INTRAMUSCULAR; INTRAVENOUS at 19:24

## 2022-04-10 RX ADMIN — ACETAMINOPHEN 1000 MG: 500 TABLET, FILM COATED ORAL at 19:25

## 2022-04-10 RX ADMIN — ASPIRIN 81 MG CHEWABLE TABLET 324 MG: 81 TABLET CHEWABLE at 21:38

## 2022-04-10 RX ADMIN — NITROGLYCERIN 0.4 MG: 0.4 TABLET SUBLINGUAL at 17:56

## 2022-04-10 RX ADMIN — KETOROLAC TROMETHAMINE 15 MG: 15 INJECTION, SOLUTION INTRAMUSCULAR; INTRAVENOUS at 21:35

## 2022-04-10 RX ADMIN — POTASSIUM CHLORIDE 40 MEQ: 1500 TABLET, EXTENDED RELEASE ORAL at 19:25

## 2022-04-10 RX ADMIN — LIDOCAINE HYDROCHLORIDE 30 ML: 20 SOLUTION ORAL; TOPICAL at 18:03

## 2022-04-10 RX ADMIN — SODIUM CHLORIDE 500 ML: 9 INJECTION, SOLUTION INTRAVENOUS at 18:26

## 2022-04-10 ASSESSMENT — ENCOUNTER SYMPTOMS
ABDOMINAL PAIN: 1
LIGHT-HEADEDNESS: 1
SHORTNESS OF BREATH: 1
NAUSEA: 1

## 2022-04-10 NOTE — ED TRIAGE NOTES
Pt here with c/o lightheadedness, mid-sternal CP that radiates to L neck and some SOB this afternoon. Reports last night felt faint and had syncope in chair and woke up diaphoretic and nauseated. Not on blood thinners. ABC intact.

## 2022-04-10 NOTE — ED PROVIDER NOTES
History   Chief Complaint:  Syncope    HPI   Shana Ventura is a 56 year old female who presents with syncope last night. She was out listening to live music with her  and few other people. At the time, she had a cocktail or two that night. She was conversing with her  and fainted after feeing nauseated and lightheaded. After 3-4 minute she woke up with her  and two nurses in the bar taking care of her. She immediately went to have a bowel movement. When EMS arrived, they checked her sugar and fluids but did not bring her to the hospital at that time. Today after [facility], she started to have chest pain, that she rates 7/10, that worsens when she walks around. She is having some abdominal pain and nausea too.     Review of Systems   Respiratory: Positive for shortness of breath.    Cardiovascular: Positive for chest pain.   Gastrointestinal: Positive for abdominal pain and nausea.   Neurological: Positive for syncope and light-headedness.   All other systems reviewed and are negative.        Allergies:  Codeine Sulfate  Penicillins    Medications:  albuterol  amitriptyline  atorvastatin  Dextrose  duloxetine  hydrochlorothiazide  hydrocodone-acetaminophen  lisinopril-hydrochlorothiazide  metformin  minocycline  tranexamic acid  Trulicity   cyclobenzaprine  tizanidine  polyethylene glycol  ondansetron  tramadol  phentermine    Past Medical History:     Arthritis   Diabetes mellitus, Type II  Fibromyalgia  Gastroesophageal reflux disease  Hypertension   Knee pain   Morbid obesity  Carbuncle and furuncle  epistasis  Hidradenitis  Squamous intraepithelial lesion on cervical pap smear  Migraine  Osteoarthritis  Vitamin D deficiency  Tobacco use  Varicella  Depression  DKA, type 2  Hidradenitis  Breast abscess  Eczema    Past Surgical History:     section  Gastrectomy laparoscopic sleeve  Partial hysterectomy  Carpal tunnel release  GA ligate fallopian tubes    Family History:    Mother: Lung  Cancer  Father: Diabetes, Lung cancer, Leukemia  Sister: Obesity, Lung cancer, Diabetes, Thyroid disease  Brother: Lung cancer    Social History:  Patient reports smoking 1-2 cigarettes a day.  She is currently accompanied by her .      Physical Exam     Patient Vitals for the past 24 hrs:   BP Temp Temp src Pulse Resp SpO2 Weight   04/10/22 2130 129/88 -- -- 65 -- 100 % --   04/10/22 2115 118/72 -- -- 59 -- 100 % --   04/10/22 2100 119/79 -- -- 75 -- 100 % --   04/10/22 2045 121/81 -- -- 62 -- 100 % --   04/10/22 2030 (!) 139/90 -- -- 61 -- 100 % --   04/10/22 2015 128/83 -- -- 64 -- 100 % --   04/10/22 2000 (!) 130/101 -- -- 67 -- 100 % --   04/10/22 1930 138/82 -- -- 61 17 100 % --   04/10/22 1915 125/85 -- -- 63 16 100 % --   04/10/22 1900 118/82 -- -- 61 17 100 % --   04/10/22 1726 (!) 162/107 97.3  F (36.3  C) Temporal 76 18 99 % --   04/10/22 1719 -- -- -- -- -- -- 133.4 kg (294 lb 1.5 oz)       Physical Exam  Constitutional: Well developed, nontox appearance  Head: Atraumatic.   Mouth/Throat: Oropharynx is clear and moist.   Neck:  no stridor  Eyes: no scleral icterus  Cardiovascular: RRR, 2+ bilat radial pulses  Pulmonary/Chest: nml resp effort, Clear BS bilat, somewhat reproducible left-sided chest and shoulder tenderness  Abdominal: ND, soft, equivocal epigastric tenderness, no rebound or guarding   : no CVA tenderness bilat  Ext: Warm, well perfused, no edema  Neurological: A&O, symmetric facies, moves ext x4  Skin: Skin is warm and dry.   Psychiatric: Behavior is normal. Thought content normal.   Nursing note and vitals reviewed.    Emergency Department Course   ECG  ECG obtained at 1752, ECG read at 1756  Normal sinus rhythm  Normal ECG   No significant changes as compared to prior, dated 9/1/21.  Rate 63 bpm. ID interval 182 ms. QRS duration 84 ms. QT/QTc 422/431 ms. P-R-T axes 28 3 23.     Imaging:  Chest XR,  PA & LAT   Final Result   IMPRESSION: Negative chest.      Exercise Stress  Echocardiogram    (Results Pending)     Report per radiology    Laboratory:  Labs Ordered and Resulted from Time of ED Arrival to Time of ED Departure   COMPREHENSIVE METABOLIC PANEL - Abnormal       Result Value    Sodium 138      Potassium 3.3 (*)     Chloride 107      Carbon Dioxide (CO2) 28      Anion Gap 3      Urea Nitrogen 14      Creatinine 0.92      Calcium 9.2      Glucose 98      Alkaline Phosphatase 96      AST 20      ALT 21      Protein Total 7.6      Albumin 3.3 (*)     Bilirubin Total 0.2      GFR Estimate 74     TROPONIN I - Normal    Troponin I High Sensitivity 7     D DIMER QUANTITATIVE - Normal    D-Dimer Quantitative 0.50     LIPASE - Normal    Lipase 107     TROPONIN I - Normal    Troponin I High Sensitivity 7     CBC WITH PLATELETS AND DIFFERENTIAL    WBC Count 7.4      RBC Count 3.90      Hemoglobin 11.7      Hematocrit 37.0      MCV 95      MCH 30.0      MCHC 31.6      RDW 14.3      Platelet Count 214      % Neutrophils 61      % Lymphocytes 30      % Monocytes 5      % Eosinophils 4      % Basophils 0      % Immature Granulocytes 0      NRBCs per 100 WBC 0      Absolute Neutrophils 4.4      Absolute Lymphocytes 2.2      Absolute Monocytes 0.4      Absolute Eosinophils 0.3      Absolute Basophils 0.0      Absolute Immature Granulocytes 0.0      Absolute NRBCs 0.0     INFLUENZA A/B & SARS-COV2 PCR MULTIPLEX        Emergency Department Course:             Reviewed:  I reviewed nursing notes, vitals, past medical history and Care Everywhere    Assessments:  1730 I obtained history and examined the patient as noted above.   2127 I rechecked the patient and explained findings.     Interventions:  1756 Nitroglycerin 0.4 sublingual  1803 GI Cocktail - Maalox 15 mL, Viscous Lidocaine 15 mL, 30 mL suspension PO  1826  mL IV Bolus  1924 ondansetron 4 mg IV  1925 KLOR-Con M 40 mEq PO  1925 Acetaminophen 1,000 mg PO  2135 Toradol 15 mg IV  2138 Aspirin 324 mg PO    Disposition:  The patient was  discharged to home.     Impression & Plan     CMS Diagnoses: None    Medical Decision Makin year old female presenting w/ chest discomfort, syncope yesterday     DDx includes ACS, unstable angina, chest wall pain, GERD, pneumothorax, pneumonia, vasovagal syncope, orthostatic syncope, electrolyte abnormality, anemia, PE.  EKG interpretation as noted above sig for no acute ischemic findings.  Labs and imaging ordered as noted above.  Labs significant for troponin within normal limits x2, D-dimer within normal limits, Covid and influenza testing pending on discharge.  Imaging sig for no acute cardiopulmonary disease.  Interventions as noted above with mild improvement in symptoms.  Given patient's risk factors and description of symptoms (Heart score 3), I think it would be reasonable to have them follow-up for an outpt stress test which was ordered prior to discharge.  There is likely component of chest wall pain to the patient's presentation given reproducibility on exam.  At this time I feel the patient is safe for discharge.  Recommendations given regarding follow up with PCP and return to the emergency department as needed for new or worsening symptoms.  Pt counseled on all results, diagnosis and disposition.  They are understanding and agreeable to plan. Patient discharged in stable condition.       Diagnosis:    ICD-10-CM    1. Chest pain, unspecified type  R07.9 Follow-Up with Cardiology BECCA     Exercise Stress Echocardiogram   2. Syncope, unspecified syncope type  R55 Follow-Up with Cardiology BECCA     Exercise Stress Echocardiogram       Discharge Medications:  Discharge Medication List as of 4/10/2022  9:40 PM          Scribe Disclosure:  Hudson MARIA, am serving as a scribe at 5:28 PM on 4/10/2022 to document services personally performed by Romulo Kimball MD based on my observations and the provider's statements to me.              Romulo Kimball MD  04/10/22 0693

## 2022-04-11 LAB
ATRIAL RATE - MUSE: 63 BPM
DIASTOLIC BLOOD PRESSURE - MUSE: NORMAL MMHG
INTERPRETATION ECG - MUSE: NORMAL
P AXIS - MUSE: 28 DEGREES
PR INTERVAL - MUSE: 182 MS
QRS DURATION - MUSE: 84 MS
QT - MUSE: 422 MS
QTC - MUSE: 431 MS
R AXIS - MUSE: 3 DEGREES
SYSTOLIC BLOOD PRESSURE - MUSE: NORMAL MMHG
T AXIS - MUSE: 23 DEGREES
VENTRICULAR RATE- MUSE: 63 BPM

## 2022-04-11 NOTE — RESULT ENCOUNTER NOTE
Negative for Influenza A, Influenza B, RSV and Covid19.  Patient will receive the Covid19 result via [employer] and a letter will be sent via [employer] (if active) or via the mail

## 2022-04-11 NOTE — DISCHARGE INSTRUCTIONS
Discharge Instructions  Chest Pain    You have been seen today for chest pain or discomfort.  At this time, your provider has found no signs that your chest pain is due to a serious or life-threatening condition, (or you have declined more testing and/or admission to the hospital). However, sometimes there is a serious problem that does not show up right away. Your evaluation today may not be complete and you may need further testing and evaluation.     Generally, every Emergency Department visit should have a follow-up clinic visit with either a primary or a specialty clinic/provider. Please follow-up as instructed by your emergency provider today.  Return to the Emergency Department if:  Your chest pain changes, gets worse, starts to happen more often, or comes with less activity.  You are newly short of breath.  You get very weak or tired.  You pass out or faint.  You have any new symptoms, like fever, cough, numb legs, or you cough up blood.  You have anything else that worries you.    Until you follow-up with your regular provider, please do the following:  Take one aspirin daily unless you have an allergy or are told not to by your provider.  If a stress test appointment has been made, go to the appointment.  If you have questions, contact your regular provider.  Follow-up with your regular provider/clinic as directed; this is very important.    If you were given a prescription for medicine here today, be sure to read all of the information (including the package insert) that comes with your prescription.  This will include important information about the medicine, its side effects, and any warnings that you need to know about.  The pharmacist who fills the prescription can provide more information and answer questions you may have about the medicine.  If you have questions or concerns that the pharmacist cannot address, please call or return to the Emergency Department.       Remember that you can always  come back to the Emergency Department if you are not able to see your regular provider in the amount of time listed above, if you get any new symptoms, or if there is anything that worries you.    Discharge Instructions  Syncope    Syncope (fainting) is a sudden, short loss of consciousness (passing out spell). People will usually fall to the ground when they faint or slump over if seated.  People may also shake when this happens, and it can sometimes be difficult to tell the difference between syncope and a seizure. At this time, your provider does not find a reason to suspect that your fainting spell is a sign of anything dangerous or life-threatening.  However, sometimes the signs of serious illness do not show up right away.     Generally, every Emergency Department visit should have a follow-up clinic visit with either a primary or a specialty clinic/provider. Please follow-up as instructed by your emergency provider today.    Return to the Emergency Department if:  You faint again.   You have any significant bleeding.  You have chest pain or a fast or irregular heartbeat.  You feel short of breath.  You cough up any blood.  You have abdominal (belly) pain or unusual back pain.  You have ongoing vomiting (throwing up) or diarrhea (loose stools).  You have a black or tarry bowel movement, or blood in the stool or in your vomit.  You have a fever over 101 F.  You lose feeling or cannot move a part of your body or cannot talk normally.  You are confused, have a headache, cannot see well, or have a seizure.  DO NOT DRIVE. CALL 911 INSTEAD!    What can I do to help myself?  Follow any specific instructions that your provider discussed with you.  If you feel light-headed, make sure to sit down right away, even if you have to sit on the floor.  Follow up with your regular medical provider as discussed for further management. This may include lowering your blood pressure medications, insulin or other diabetic  medications, checking your blood sugar more frequently, and drinking more fluids, taking medicines for vomiting or diarrhea or getting up slower.  If you were given a prescription for medicine here today, be sure to read all of the information (including the package insert) that comes with your prescription.  This will include important information about the medicine, its side effects, and any warnings that you need to know about.  The pharmacist who fills the prescription can provide more information and answer questions you may have about the medicine.  If you have questions or concerns that the pharmacist cannot address, please call or return to the Emergency Department.   Remember that you can always come back to the Emergency Department if you are not able to see your regular provider in the amount of time listed above, if you get any new symptoms, or if there is anything that worries you.

## 2022-04-12 ENCOUNTER — HOSPITAL ENCOUNTER (OUTPATIENT)
Dept: CARDIOLOGY | Facility: CLINIC | Age: 57
Discharge: HOME | End: 2022-04-12
Attending: EMERGENCY MEDICINE | Admitting: EMERGENCY MEDICINE
Payer: COMMERCIAL

## 2022-04-12 DIAGNOSIS — R55 SYNCOPE, UNSPECIFIED SYNCOPE TYPE: ICD-10-CM

## 2022-04-12 DIAGNOSIS — R07.9 CHEST PAIN, UNSPECIFIED TYPE: ICD-10-CM

## 2022-04-12 PROCEDURE — 93325 DOPPLER ECHO COLOR FLOW MAPG: CPT | Mod: 26 | Performed by: INTERNAL MEDICINE

## 2022-04-12 PROCEDURE — 255N000002 HC RX 255 OP 636: Performed by: EMERGENCY MEDICINE

## 2022-04-12 PROCEDURE — 999N000208 ECHO STRESS ECHOCARDIOGRAM

## 2022-04-12 PROCEDURE — 93350 STRESS TTE ONLY: CPT | Mod: 26 | Performed by: INTERNAL MEDICINE

## 2022-04-12 PROCEDURE — 93321 DOPPLER ECHO F-UP/LMTD STD: CPT | Mod: 26 | Performed by: INTERNAL MEDICINE

## 2022-04-12 PROCEDURE — 93016 CV STRESS TEST SUPVJ ONLY: CPT | Performed by: INTERNAL MEDICINE

## 2022-04-12 PROCEDURE — 93018 CV STRESS TEST I&R ONLY: CPT | Performed by: INTERNAL MEDICINE

## 2022-04-12 RX ADMIN — HUMAN ALBUMIN MICROSPHERES AND PERFLUTREN 3 ML: 10; .22 INJECTION, SOLUTION INTRAVENOUS at 13:19

## 2022-04-15 ENCOUNTER — OFFICE VISIT (OUTPATIENT)
Dept: SURGERY | Facility: CLINIC | Age: 57
End: 2022-04-15
Payer: COMMERCIAL

## 2022-04-15 ENCOUNTER — TELEPHONE (OUTPATIENT)
Dept: SURGERY | Facility: CLINIC | Age: 57
End: 2022-04-15

## 2022-04-15 VITALS — HEIGHT: 67 IN | WEIGHT: 286.6 LBS | BODY MASS INDEX: 44.98 KG/M2

## 2022-04-15 DIAGNOSIS — E66.01 OBESITY, CLASS III, BMI 40-49.9 (MORBID OBESITY) (H): Primary | ICD-10-CM

## 2022-04-15 PROCEDURE — 99214 OFFICE O/P EST MOD 30 MIN: CPT | Performed by: SURGERY

## 2022-04-15 RX ORDER — CHLORHEXIDINE GLUCONATE ORAL RINSE 1.2 MG/ML
SOLUTION DENTAL
COMMUNITY
Start: 2022-01-11 | End: 2024-01-21

## 2022-04-15 RX ORDER — NAPROXEN 500 MG/1
500 TABLET ORAL 2 TIMES DAILY PRN
Status: ON HOLD | COMMUNITY
Start: 2022-01-21 | End: 2024-01-23

## 2022-04-15 RX ORDER — CYCLOBENZAPRINE HCL 10 MG
10 TABLET ORAL 3 TIMES DAILY PRN
Status: ON HOLD | COMMUNITY
Start: 2022-01-21 | End: 2024-01-23

## 2022-04-15 RX ORDER — DULAGLUTIDE 1.5 MG/.5ML
INJECTION, SOLUTION SUBCUTANEOUS
COMMUNITY
Start: 2022-03-09 | End: 2024-01-21

## 2022-04-15 NOTE — LETTER
4/15/2022         RE: Shana Ventura  [address]  [place] [id number]        Dear Colleague,    Thank you for referring your patient, Shana Ventura, to the [facility]. Please see a copy of my visit note below.    HPI: Shana Ventura is a 56 year old female here today for consideration of metabolic and bariatric surgery.  She previously underwent a laparoscopic sleeve gastrectomy in 2012 with my colleague Dr. Nathan.  She had an initial good response, going from 336 pounds down to 240 pounds following her surgery.  Over the subsequent 3 years she then gained back much of the weight to 309 pounds.  Some of this is associated with the loss of her father and sister.  Since that time she had been working diligently with her weight management program, and has been able to hold her weight in the 280-290 range.  Over the past few years however, she been having increasing difficulty with joint pain, particularly her knees but also with her hips.  She undergoes regular corticosteroid injections to her right knee and has been advised by her orthopedic surgeon that she may need a knee replacement, and additional weight loss would be greatly beneficial to the health of her knees and any potential future knee replacement surgery.  For those reasons she is interested in revision to her sleeve gastrectomy to help facilitate additional weight loss.    She is currently smoking, having resumed smoking last year.  Smoking 2 cigarettes/day.  Has only mild reflux disease.    Her bariatric comorbidities include history of type 2 diabetes, osteoarthritis of the knees.       Allergies:Codeine sulfate and Penicillins    Past Medical History:   Diagnosis Date     Arthritis     right knee     Diabetes mellitus (H)      Fibromyalgia, primary      GERD (gastroesophageal reflux disease)     after her sleeve     Hypertension      Knee pain      Morbid obesity (H)         Past Surgical History:   Procedure Laterality Date      SECTION       GASTRECTOMY LAPAROSCOPIC SLEEVE  2013    Dr. Nathan     PARTIAL HYSTERECTOMY         CURRENT MEDS:  Prior to Admission medications    Medication Sig Start Date End Date Taking? Authorizing Provider   ACCU-CHEK GUIDE test strip  21  Yes Reported, Patient   acetaminophen (TYLENOL) 500 MG tablet Take 1,000 mg by mouth 3/19/20  Yes Reported, Patient   albuterol (PROAIR HFA/PROVENTIL HFA/VENTOLIN HFA) 108 (90 Base) MCG/ACT inhaler Inhale 1-2 puffs into the lungs 19  Yes Reported, Patient   blood glucose monitoring (ACCU-CHEK FASTCLIX) lancets 2 times daily 21  Yes Reported, Patient   chlorhexidine (PERIDEX) 0.12 % solution SWISH FOR 30 SECONDS THEN SPIT. USE TWICE DAILY FOR 2 WEEKS 22  Yes Reported, Patient   cyclobenzaprine (FLEXERIL) 10 MG tablet  22  Yes Reported, Patient   Dextrose, Diabetic Use, (GLUCOSE) 1 g CHEW Take 4 tablets by mouth   Yes Reported, Patient   hydrochlorothiazide (HYDRODIURIL) 25 MG tablet Take 25 mg by mouth daily 21  Yes Reported, Patient   minocycline (MINOCIN) 100 MG capsule Take 100 mg by mouth 2 times daily 21  Yes Reported, Patient   naproxen (NAPROSYN) 500 MG tablet  22  Yes Reported, Patient   NO ACTIVE MEDICATIONS    Yes Reported, Patient   Prenatal Vit-Fe Fumarate-FA (PRENATAL MULTIVITAMIN W/IRON) 27-0.8 MG tablet Take 2 tablets by mouth daily 21  Yes Reported, Patient   TRULICITY 1.5 MG/0.5ML pen ADMINISTER 1.5 MG UNDER THE SKIN 1 TIME A WEEK 3/9/22  Yes Reported, Patient   vitamin D2 (ERGOCALCIFEROL) 97846 units (1250 mcg) capsule  21  Yes Reported, Patient         Social Connections: Not on file       Family History   Problem Relation Age of Onset     Cancer Mother      Diabetes Father      Obesity Sister         reports that she has never smoked. She has never used smokeless tobacco. She reports current alcohol use. She reports that she does  "not use drugs.    History   Smoking Status     Never Smoker   Smokeless Tobacco     Never Used       Review of Systems -  A complete ROS was reviewed and except for what is listed in the HPI above, all others are negative  PSYCHIATRIC: She has undergone a lifestyle assessment and has been deemed a good candidate for bariatric surgery by the psychologist.    Ht 1.702 m (5' 7\")   Wt 130 kg (286 lb 9.6 oz)   BMI 44.89 kg/m      Wt Readings from Last 4 Encounters:   04/15/22 130 kg (286 lb 9.6 oz)   04/10/22 133.4 kg (294 lb 1.5 oz)   01/06/22 134.7 kg (297 lb)   12/03/21 131.1 kg (289 lb)        Body mass index is 44.89 kg/m .    EXAM:  GENERAL: This is a well-developed 56 year old female who appears her stated age  HEAD & NECK: Grossly normal.  No visible goiter or scars  CARDIAC: Regular rate and rhythm  CHEST/LUNG: Normal respiratory effort  ABDOMEN: Obese.  No visible hernias or masses appreciated.  LYMPHATIC:  No significant adenopathy visualized.    EXTREMITIES: Grossly normal.  No evidence of chronic venous stasis.    NEUROLOGIC: Focally intact  INTEGUMENT: No open lesions or ulcers appreciated visually  PSYCHIATRIC: Normal affect. She has a good grasp on the nature of her obesity and the treatment options.    LABS:      Assessment/Plan: 56 year old female who I think would benefit from conversion from her sleeve gastrectomy to a duodenal switch.  However, she needs to be off all tobacco products for at least 3 months prior to that time.  Due to her smoking history, including resuming smoking after a long interval of smoking abstinence, I do not think conversion to a bypass would be a good idea.  The duodenal switch however has a more protected anastomosis with lower risk of anastomotic ulcer and it would be a safer option for her.    She was advised to let us know when she has quit smoking, and we would not start o'clock or smoking cessation.  I will plan to see her back after she has been abstinent from " smoking for 3 months for repeat visit and more in-depth discussion regarding surgery.      Brendan Evans MD ,MD  [facility] Department of Surgery      Again, thank you for allowing me to participate in the care of your patient.        Sincerely,        Brendan Evans MD

## 2022-04-15 NOTE — PROGRESS NOTES
HPI: Shana Ventura is a 56 year old female here today for consideration of metabolic and bariatric surgery.  She previously underwent a laparoscopic sleeve gastrectomy in  with my colleague Dr. Nathan.  She had an initial good response, going from 336 pounds down to 240 pounds following her surgery.  Over the subsequent 3 years she then gained back much of the weight to 309 pounds.  Some of this is associated with the loss of her father and sister.  Since that time she had been working diligently with her weight management program, and has been able to hold her weight in the 280-290 range.  Over the past few years however, she been having increasing difficulty with joint pain, particularly her knees but also with her hips.  She undergoes regular corticosteroid injections to her right knee and has been advised by her orthopedic surgeon that she may need a knee replacement, and additional weight loss would be greatly beneficial to the health of her knees and any potential future knee replacement surgery.  For those reasons she is interested in revision to her sleeve gastrectomy to help facilitate additional weight loss.    She is currently smoking, having resumed smoking last year.  Smoking 2 cigarettes/day.  Has only mild reflux disease.    Her bariatric comorbidities include history of type 2 diabetes, osteoarthritis of the knees.       Allergies:Codeine sulfate and Penicillins    Past Medical History:   Diagnosis Date     Arthritis     right knee     Diabetes mellitus (H)      Fibromyalgia, primary      GERD (gastroesophageal reflux disease)     after her sleeve     Hypertension      Knee pain      Morbid obesity (H)        Past Surgical History:   Procedure Laterality Date      SECTION       GASTRECTOMY LAPAROSCOPIC SLEEVE      Dr. Nathan     PARTIAL HYSTERECTOMY         CURRENT MEDS:  Prior to Admission medications    Medication Sig Start Date End Date Taking? Authorizing Provider    ACCU-CHEK GUIDE test strip  9/29/21  Yes Reported, Patient   acetaminophen (TYLENOL) 500 MG tablet Take 1,000 mg by mouth 3/19/20  Yes Reported, Patient   albuterol (PROAIR HFA/PROVENTIL HFA/VENTOLIN HFA) 108 (90 Base) MCG/ACT inhaler Inhale 1-2 puffs into the lungs 12/19/19  Yes Reported, Patient   blood glucose monitoring (ACCU-CHEK FASTCLIX) lancets 2 times daily 1/19/21  Yes Reported, Patient   chlorhexidine (PERIDEX) 0.12 % solution SWISH FOR 30 SECONDS THEN SPIT. USE TWICE DAILY FOR 2 WEEKS 1/11/22  Yes Reported, Patient   cyclobenzaprine (FLEXERIL) 10 MG tablet  1/21/22  Yes Reported, Patient   Dextrose, Diabetic Use, (GLUCOSE) 1 g CHEW Take 4 tablets by mouth   Yes Reported, Patient   hydrochlorothiazide (HYDRODIURIL) 25 MG tablet Take 25 mg by mouth daily 7/2/21  Yes Reported, Patient   minocycline (MINOCIN) 100 MG capsule Take 100 mg by mouth 2 times daily 9/22/21  Yes Reported, Patient   naproxen (NAPROSYN) 500 MG tablet  1/21/22  Yes Reported, Patient   NO ACTIVE MEDICATIONS    Yes Reported, Patient   Prenatal Vit-Fe Fumarate-FA (PRENATAL MULTIVITAMIN W/IRON) 27-0.8 MG tablet Take 2 tablets by mouth daily 1/14/21  Yes Reported, Patient   TRULICITY 1.5 MG/0.5ML pen ADMINISTER 1.5 MG UNDER THE SKIN 1 TIME A WEEK 3/9/22  Yes Reported, Patient   vitamin D2 (ERGOCALCIFEROL) 64625 units (1250 mcg) capsule  7/28/21  Yes Reported, Patient         Social Connections: Not on file       Family History   Problem Relation Age of Onset     Cancer Mother      Diabetes Father      Obesity Sister         reports that she has never smoked. She has never used smokeless tobacco. She reports current alcohol use. She reports that she does not use drugs.    History   Smoking Status     Never Smoker   Smokeless Tobacco     Never Used       Review of Systems -  A complete ROS was reviewed and except for what is listed in the HPI above, all others are negative  PSYCHIATRIC: She has undergone a lifestyle assessment and has been  "deemed a good candidate for bariatric surgery by the psychologist.    Ht 1.702 m (5' 7\")   Wt 130 kg (286 lb 9.6 oz)   BMI 44.89 kg/m      Wt Readings from Last 4 Encounters:   04/15/22 130 kg (286 lb 9.6 oz)   04/10/22 133.4 kg (294 lb 1.5 oz)   01/06/22 134.7 kg (297 lb)   12/03/21 131.1 kg (289 lb)        Body mass index is 44.89 kg/m .    EXAM:  GENERAL: This is a well-developed 56 year old female who appears her stated age  HEAD & NECK: Grossly normal.  No visible goiter or scars  CARDIAC: Regular rate and rhythm  CHEST/LUNG: Normal respiratory effort  ABDOMEN: Obese.  No visible hernias or masses appreciated.  LYMPHATIC:  No significant adenopathy visualized.    EXTREMITIES: Grossly normal.  No evidence of chronic venous stasis.    NEUROLOGIC: Focally intact  INTEGUMENT: No open lesions or ulcers appreciated visually  PSYCHIATRIC: Normal affect. She has a good grasp on the nature of her obesity and the treatment options.    LABS:      Assessment/Plan: 56 year old female who I think would benefit from conversion from her sleeve gastrectomy to a duodenal switch.  However, she needs to be off all tobacco products for at least 3 months prior to that time.  Due to her smoking history, including resuming smoking after a long interval of smoking abstinence, I do not think conversion to a bypass would be a good idea.  The duodenal switch however has a more protected anastomosis with lower risk of anastomotic ulcer and it would be a safer option for her.    She was advised to let us know when she has quit smoking, and we would not start o'clock or smoking cessation.  I will plan to see her back after she has been abstinent from smoking for 3 months for repeat visit and more in-depth discussion regarding surgery.      Brendan Evans MD ,MD  [facility] Department of Surgery  "

## 2022-04-15 NOTE — PATIENT INSTRUCTIONS
[facility] Informed Consent for Bariatric Surgery from the clinic was given to the patient, reviewed, signed and sent for scanning.

## 2022-04-17 ENCOUNTER — HEALTH MAINTENANCE LETTER (OUTPATIENT)
Age: 57
End: 2022-04-17

## 2022-06-09 ENCOUNTER — OFFICE VISIT (OUTPATIENT)
Dept: CARDIOLOGY | Facility: CLINIC | Age: 57
End: 2022-06-09
Attending: EMERGENCY MEDICINE
Payer: COMMERCIAL

## 2022-06-09 VITALS
BODY MASS INDEX: 43.66 KG/M2 | SYSTOLIC BLOOD PRESSURE: 108 MMHG | HEART RATE: 68 BPM | HEIGHT: 67 IN | DIASTOLIC BLOOD PRESSURE: 72 MMHG | WEIGHT: 278.2 LBS

## 2022-06-09 DIAGNOSIS — R55 SYNCOPE, UNSPECIFIED SYNCOPE TYPE: ICD-10-CM

## 2022-06-09 DIAGNOSIS — R07.9 CHEST PAIN, UNSPECIFIED TYPE: ICD-10-CM

## 2022-06-09 PROCEDURE — 99204 OFFICE O/P NEW MOD 45 MIN: CPT | Performed by: INTERNAL MEDICINE

## 2022-06-09 NOTE — PROGRESS NOTES
HPI and Plan:   It is my pleasure to see this very pleasant 56-year-old -American lady for evaluation of syncope.    First episode of syncope, no previous dizzy spells.  Has been diabetic since 2010.  Tells me her blood sugar has been under good control.  On hydrochlorothiazide for hypertension.  Not on aspirin or statin.  Blood pressures been under good control.    Several weeks ago had first episode of syncope.  Under a lot of emotional stress at this time.  She suddenly felt dizzy had an out of body experience and then lost consciousness.  Witnessed by  who poured cold water on her face.  Woke up after 2 or 3 minutes.  No seizure activity observed.  Went to the emergency room.  EKG which I personally reviewed appears normal.  She was not hypoglycemic or hyperglycemic.    She has no exertional chest discomfort shortness of breath or heart failure symptoms.  Recent stress echocardiogram was normal.    Was evaluated for palpitations within the past year and rhythm monitoring did not show any abnormal heart rhythms.    Cardiac examination is normal.  No orthostasis    I feel quite certain that this lady had a vasovagal syncope.  The mechanism of this was explained to the patient.  She understands.  Advised her on avoidance maneuvers.    No further work-up from a cardiac point of view is needed at this time and she is discharged from clinic.        No orders of the defined types were placed in this encounter.      No orders of the defined types were placed in this encounter.      Encounter Diagnoses   Name Primary?     Chest pain, unspecified type      Syncope, unspecified syncope type        CURRENT MEDICATIONS:  Current Outpatient Medications   Medication Sig Dispense Refill     ACCU-CHEK GUIDE test strip        acetaminophen (TYLENOL) 500 MG tablet Take 1,000 mg by mouth every 6 hours as needed       albuterol (PROAIR HFA/PROVENTIL HFA/VENTOLIN HFA) 108 (90 Base) MCG/ACT inhaler Inhale 1-2 puffs into  the lungs every 4 hours as needed       blood glucose monitoring (ACCU-CHEK FASTCLIX) lancets 2 times daily       chlorhexidine (PERIDEX) 0.12 % solution SWISH FOR 30 SECONDS THEN SPIT. USE TWICE DAILY FOR 2 WEEKS       cyclobenzaprine (FLEXERIL) 10 MG tablet Take 10 mg by mouth 2 times daily as needed       Dextrose, Diabetic Use, (GLUCOSE) 1 g CHEW Take 4 tablets by mouth as needed       hydrochlorothiazide (HYDRODIURIL) 25 MG tablet Take 25 mg by mouth daily       minocycline (MINOCIN) 100 MG capsule Take 100 mg by mouth 2 times daily       naproxen (NAPROSYN) 500 MG tablet Take 500 mg by mouth as needed       Prenatal Vit-Fe Fumarate-FA (PRENATAL MULTIVITAMIN W/IRON) 27-0.8 MG tablet Take 2 tablets by mouth daily       TRULICITY 1.5 MG/0.5ML pen ADMINISTER 1.5 MG UNDER THE SKIN 1 TIME A WEEK       vitamin D2 (ERGOCALCIFEROL) 57887 units (1250 mcg) capsule Take 50,000 Units by mouth once a week       NO ACTIVE MEDICATIONS          ALLERGIES     Allergies   Allergen Reactions     Codeine Sulfate      Penicillins        PAST MEDICAL HISTORY:  Past Medical History:   Diagnosis Date     Arthritis     right knee     Diabetes mellitus (H)      Fibromyalgia, primary      GERD (gastroesophageal reflux disease)     after her sleeve     Hypertension      Knee pain      Morbid obesity (H)        PAST SURGICAL HISTORY:  Past Surgical History:   Procedure Laterality Date      SECTION       GASTRECTOMY LAPAROSCOPIC SLEEVE  2013    Dr. Nathan     PARTIAL HYSTERECTOMY         FAMILY HISTORY:  Family History   Problem Relation Age of Onset     Cancer Mother      Diabetes Father      Obesity Sister        SOCIAL HISTORY:  Social History     Socioeconomic History     Marital status:      Spouse name: None     Number of children: None     Years of education: None     Highest education level: None   Tobacco Use     Smoking status: Never Smoker     Smokeless tobacco: Never Used   Substance and Sexual Activity      "Alcohol use: Yes     Comment: Alcoholic Drinks/day: Special Occ.      Drug use: No     Sexual activity: Yes     Partners: Male     Birth control/protection: Surgical   Social History Narrative    Single, Lee' father of her children living together  Working FT [employer]  2 daughters, one daughter graduated from [employer] and going to [employer]  2 Sons        Review of Systems:  Skin:  not assessed     Eyes:  not assessed    ENT:  not assessed    Respiratory:  not assessed    Cardiovascular:       Gastroenterology: Positive for abdominal pain  Genitourinary:  not assessed    Musculoskeletal:  not assessed    Neurologic:  Negative    Psychiatric:  not assessed    Heme/Lymph/Imm:  not assessed    Endocrine:  not assessed      Physical Exam:  Vitals: /72   Pulse 68   Ht 1.702 m (5' 7\")   Wt 126.2 kg (278 lb 3.2 oz)   BMI 43.57 kg/m      Constitutional:  cooperative, alert and oriented, well developed, well nourished, in no acute distress obese      Skin:  warm and dry to the touch, no apparent skin lesions or masses noted          Head:  normocephalic, no masses or lesions        Eyes:  pupils equal and round, conjunctivae and lids unremarkable, sclera white, no xanthalasma, EOMS intact, no nystagmus        Lymph:No Cervical lymphadenopathy present     ENT:  no pallor or cyanosis, dentition good        Neck:  carotid pulses are full and equal bilaterally, JVP normal, no carotid bruit        Respiratory:  normal breath sounds, clear to auscultation, normal A-P diameter, normal symmetry, normal respiratory excursion, no use of accessory muscles         Cardiac: regular rhythm, normal S1/S2, no S3 or S4, apical impulse not displaced, no murmurs, gallops or rubs                pulses full and equal, no bruits auscultated                                        GI:  abdomen soft, non-tender, BS normoactive, no mass, no HSM, no bruits        Extremities and Muscular Skeletal:  no deformities, " clubbing, cyanosis, erythema observed              Neurological:  no gross motor deficits        Psych:  Alert and Oriented x 3        Recent Lab Results:  LIPID RESULTS:  Lab Results   Component Value Date    CHOL 194 07/23/2020    HDL 48 (L) 07/23/2020     07/23/2020    TRIG 196 (H) 07/23/2020       LIVER ENZYME RESULTS:  Lab Results   Component Value Date    AST 20 04/10/2022    ALT 21 04/10/2022       CBC RESULTS:  Lab Results   Component Value Date    WBC 7.4 04/10/2022    WBC 8.2 12/02/2019    RBC 3.90 04/10/2022    RBC 3.59 (L) 12/02/2019    HGB 11.7 04/10/2022    HGB 10.3 (L) 12/02/2019    HCT 37.0 04/10/2022    HCT 31.7 (L) 12/02/2019    MCV 95 04/10/2022    MCV 88 12/02/2019    MCH 30.0 04/10/2022    MCH 28.7 12/02/2019    MCHC 31.6 04/10/2022    MCHC 32.5 12/02/2019    RDW 14.3 04/10/2022    RDW 14.5 12/02/2019     04/10/2022     12/02/2019       BMP RESULTS:  Lab Results   Component Value Date     04/10/2022     12/02/2019    POTASSIUM 3.3 (L) 04/10/2022    POTASSIUM 3.4 12/02/2019    CHLORIDE 107 04/10/2022    CHLORIDE 111 (H) 12/02/2019    CO2 28 04/10/2022    CO2 27 12/02/2019    ANIONGAP 3 04/10/2022    ANIONGAP 3 12/02/2019    GLC 98 04/10/2022     (H) 12/02/2019    BUN 14 04/10/2022    BUN 12 12/02/2019    CR 0.92 04/10/2022    CR 0.82 12/02/2019    GFRESTIMATED 74 04/10/2022    GFRESTIMATED >60 07/23/2020    GFRESTIMATED 81 12/02/2019    GFRESTBLACK >60 07/23/2020    GFRESTBLACK >90 12/02/2019    CELINA 9.2 04/10/2022    CELINA 8.8 12/02/2019        A1C RESULTS:  Lab Results   Component Value Date    A1C 8.1 (H) 07/23/2020       INR RESULTS:  No results found for: INR        CC  Romulo Kimball MD  [facility]  [address]  [place] [address]

## 2022-06-09 NOTE — LETTER
6/9/2022    Natalia SALCEDO [facility] [address] [facility] [address]    RE: Shana Ventura       Dear Colleague,     I had the pleasure of seeing Shana Ventura in the [facility].  HPI and Plan:   It is my pleasure to see this very pleasant 56-year-old -American lady for evaluation of syncope.    First episode of syncope, no previous dizzy spells.  Has been diabetic since 2010.  Tells me her blood sugar has been under good control.  On hydrochlorothiazide for hypertension.  Not on aspirin or statin.  Blood pressures been under good control.    Several weeks ago had first episode of syncope.  Under a lot of emotional stress at this time.  She suddenly felt dizzy had an out of body experience and then lost consciousness.  Witnessed by  who poured cold water on her face.  Woke up after 2 or 3 minutes.  No seizure activity observed.  Went to the emergency room.  EKG which I personally reviewed appears normal.  She was not hypoglycemic or hyperglycemic.    She has no exertional chest discomfort shortness of breath or heart failure symptoms.  Recent stress echocardiogram was normal.    Was evaluated for palpitations within the past year and rhythm monitoring did not show any abnormal heart rhythms.    Cardiac examination is normal.  No orthostasis    I feel quite certain that this lady had a vasovagal syncope.  The mechanism of this was explained to the patient.  She understands.  Advised her on avoidance maneuvers.    No further work-up from a cardiac point of view is needed at this time and she is discharged from clinic.        No orders of the defined types were placed in this encounter.      No orders of the defined types were placed in this encounter.      Encounter Diagnoses   Name Primary?     Chest pain, unspecified type      Syncope, unspecified syncope type        CURRENT MEDICATIONS:  Current Outpatient Medications   Medication Sig Dispense Refill      ACCU-CHEK GUIDE test strip        acetaminophen (TYLENOL) 500 MG tablet Take 1,000 mg by mouth every 6 hours as needed       albuterol (PROAIR HFA/PROVENTIL HFA/VENTOLIN HFA) 108 (90 Base) MCG/ACT inhaler Inhale 1-2 puffs into the lungs every 4 hours as needed       blood glucose monitoring (ACCU-CHEK FASTCLIX) lancets 2 times daily       chlorhexidine (PERIDEX) 0.12 % solution SWISH FOR 30 SECONDS THEN SPIT. USE TWICE DAILY FOR 2 WEEKS       cyclobenzaprine (FLEXERIL) 10 MG tablet Take 10 mg by mouth 2 times daily as needed       Dextrose, Diabetic Use, (GLUCOSE) 1 g CHEW Take 4 tablets by mouth as needed       hydrochlorothiazide (HYDRODIURIL) 25 MG tablet Take 25 mg by mouth daily       minocycline (MINOCIN) 100 MG capsule Take 100 mg by mouth 2 times daily       naproxen (NAPROSYN) 500 MG tablet Take 500 mg by mouth as needed       Prenatal Vit-Fe Fumarate-FA (PRENATAL MULTIVITAMIN W/IRON) 27-0.8 MG tablet Take 2 tablets by mouth daily       TRULICITY 1.5 MG/0.5ML pen ADMINISTER 1.5 MG UNDER THE SKIN 1 TIME A WEEK       vitamin D2 (ERGOCALCIFEROL) 34613 units (1250 mcg) capsule Take 50,000 Units by mouth once a week       NO ACTIVE MEDICATIONS          ALLERGIES     Allergies   Allergen Reactions     Codeine Sulfate      Penicillins        PAST MEDICAL HISTORY:  Past Medical History:   Diagnosis Date     Arthritis     right knee     Diabetes mellitus (H)      Fibromyalgia, primary      GERD (gastroesophageal reflux disease)     after her sleeve     Hypertension      Knee pain      Morbid obesity (H)        PAST SURGICAL HISTORY:  Past Surgical History:   Procedure Laterality Date      SECTION       GASTRECTOMY LAPAROSCOPIC SLEEVE  2013    Dr. Nathan     PARTIAL HYSTERECTOMY         FAMILY HISTORY:  Family History   Problem Relation Age of Onset     Cancer Mother      Diabetes Father      Obesity Sister        SOCIAL HISTORY:  Social History     Socioeconomic History     Marital status:       "Spouse name: None     Number of children: None     Years of education: None     Highest education level: None   Tobacco Use     Smoking status: Never Smoker     Smokeless tobacco: Never Used   Substance and Sexual Activity     Alcohol use: Yes     Comment: Alcoholic Drinks/day: Special Occ.      Drug use: No     Sexual activity: Yes     Partners: Male     Birth control/protection: Surgical   Social History Narrative    Single, Lee' father of her children living together  Working FT [email or web address] district  2 daughters, one daughter graduated from [employer] and going to [employer]  2 Sons        Review of Systems:  Skin:  not assessed     Eyes:  not assessed    ENT:  not assessed    Respiratory:  not assessed    Cardiovascular:       Gastroenterology: Positive for abdominal pain  Genitourinary:  not assessed    Musculoskeletal:  not assessed    Neurologic:  Negative    Psychiatric:  not assessed    Heme/Lymph/Imm:  not assessed    Endocrine:  not assessed      Physical Exam:  Vitals: /72   Pulse 68   Ht 1.702 m (5' 7\")   Wt 126.2 kg (278 lb 3.2 oz)   BMI 43.57 kg/m      Constitutional:  cooperative, alert and oriented, well developed, well nourished, in no acute distress obese      Skin:  warm and dry to the touch, no apparent skin lesions or masses noted          Head:  normocephalic, no masses or lesions        Eyes:  pupils equal and round, conjunctivae and lids unremarkable, sclera white, no xanthalasma, EOMS intact, no nystagmus        Lymph:No Cervical lymphadenopathy present     ENT:  no pallor or cyanosis, dentition good        Neck:  carotid pulses are full and equal bilaterally, JVP normal, no carotid bruit        Respiratory:  normal breath sounds, clear to auscultation, normal A-P diameter, normal symmetry, normal respiratory excursion, no use of accessory muscles         Cardiac: regular rhythm, normal S1/S2, no S3 or S4, apical impulse not displaced, no murmurs, gallops or rubs              "   pulses full and equal, no bruits auscultated                                        GI:  abdomen soft, non-tender, BS normoactive, no mass, no HSM, no bruits        Extremities and Muscular Skeletal:  no deformities, clubbing, cyanosis, erythema observed              Neurological:  no gross motor deficits        Psych:  Alert and Oriented x 3        Recent Lab Results:  LIPID RESULTS:  Lab Results   Component Value Date    CHOL 194 07/23/2020    HDL 48 (L) 07/23/2020     07/23/2020    TRIG 196 (H) 07/23/2020       LIVER ENZYME RESULTS:  Lab Results   Component Value Date    AST 20 04/10/2022    ALT 21 04/10/2022       CBC RESULTS:  Lab Results   Component Value Date    WBC 7.4 04/10/2022    WBC 8.2 12/02/2019    RBC 3.90 04/10/2022    RBC 3.59 (L) 12/02/2019    HGB 11.7 04/10/2022    HGB 10.3 (L) 12/02/2019    HCT 37.0 04/10/2022    HCT 31.7 (L) 12/02/2019    MCV 95 04/10/2022    MCV 88 12/02/2019    MCH 30.0 04/10/2022    MCH 28.7 12/02/2019    MCHC 31.6 04/10/2022    MCHC 32.5 12/02/2019    RDW 14.3 04/10/2022    RDW 14.5 12/02/2019     04/10/2022     12/02/2019       BMP RESULTS:  Lab Results   Component Value Date     04/10/2022     12/02/2019    POTASSIUM 3.3 (L) 04/10/2022    POTASSIUM 3.4 12/02/2019    CHLORIDE 107 04/10/2022    CHLORIDE 111 (H) 12/02/2019    CO2 28 04/10/2022    CO2 27 12/02/2019    ANIONGAP 3 04/10/2022    ANIONGAP 3 12/02/2019    GLC 98 04/10/2022     (H) 12/02/2019    BUN 14 04/10/2022    BUN 12 12/02/2019    CR 0.92 04/10/2022    CR 0.82 12/02/2019    GFRESTIMATED 74 04/10/2022    GFRESTIMATED >60 07/23/2020    GFRESTIMATED 81 12/02/2019    GFRESTBLACK >60 07/23/2020    GFRESTBLACK >90 12/02/2019    CELINA 9.2 04/10/2022    CELINA 8.8 12/02/2019        A1C RESULTS:  Lab Results   Component Value Date    A1C 8.1 (H) 07/23/2020       INR RESULTS:  No results found for: INR        CC  Romulo Kimball MD  [facility]  [address]   [address]  [place] [address]    Thank you for allowing me to participate in the care of your patient.      Sincerely,     DR BURAK CAGLE MD     [facility]

## 2022-08-05 ENCOUNTER — TELEPHONE (OUTPATIENT)
Dept: SURGERY | Facility: CLINIC | Age: 57
End: 2022-08-05

## 2022-08-05 NOTE — TELEPHONE ENCOUNTER
Pt is checking to see if she is supposed to schedule appt with surgeon now that she has quit smoking    [phone]

## 2022-08-10 DIAGNOSIS — Z98.84 S/P BARIATRIC SURGERY: Primary | ICD-10-CM

## 2022-08-10 DIAGNOSIS — Z87.891 PAST USE OF TOBACCO: ICD-10-CM

## 2022-08-11 ENCOUNTER — TELEPHONE (OUTPATIENT)
Dept: SURGERY | Facility: CLINIC | Age: 57
End: 2022-08-11

## 2022-08-11 NOTE — TELEPHONE ENCOUNTER
Called pt to let her know that the order was faxed yesterday. She will call again and if they can't find it, she will go to an F lab.    Tea Clancy RN, CBN  [facility]  P [phone]  F [phone]

## 2022-08-11 NOTE — TELEPHONE ENCOUNTER
Impression: Presbyopia: H52.4. Plan: New spectacle Rx issued and released to patient. Pt called and stated [facility] did not receive the order for the urine test to check for nicotine. Pt would like to make sure this is sent and would like a status update.    [phone], detailed VM ok.

## 2022-08-12 ENCOUNTER — LAB (OUTPATIENT)
Dept: LAB | Facility: CLINIC | Age: 57
End: 2022-08-12
Payer: COMMERCIAL

## 2022-08-12 DIAGNOSIS — Z98.84 S/P BARIATRIC SURGERY: ICD-10-CM

## 2022-08-12 DIAGNOSIS — Z87.891 PAST USE OF TOBACCO: ICD-10-CM

## 2022-08-12 PROCEDURE — 80323 ALKALOIDS NOS: CPT

## 2022-08-15 LAB
ANABASINE UR-MCNC: <5 NG/ML
COTININE UR-MCNC: <15 NG/ML
NICOTINE UR-MCNC: <15 NG/ML
TRANS-3-OH-COTININE UR-MCNC: <50 NG/ML

## 2022-09-01 ENCOUNTER — OFFICE VISIT (OUTPATIENT)
Dept: SURGERY | Facility: CLINIC | Age: 57
End: 2022-09-01
Payer: COMMERCIAL

## 2022-09-01 VITALS
HEIGHT: 67 IN | BODY MASS INDEX: 44.42 KG/M2 | DIASTOLIC BLOOD PRESSURE: 80 MMHG | SYSTOLIC BLOOD PRESSURE: 122 MMHG | WEIGHT: 283 LBS

## 2022-09-01 DIAGNOSIS — E66.01 OBESITY, CLASS III, BMI 40-49.9 (MORBID OBESITY) (H): Primary | ICD-10-CM

## 2022-09-01 PROCEDURE — 99214 OFFICE O/P EST MOD 30 MIN: CPT | Performed by: SURGERY

## 2022-09-01 RX ORDER — POLYETHYLENE GLYCOL 3350, SODIUM SULFATE ANHYDROUS, SODIUM BICARBONATE, SODIUM CHLORIDE, POTASSIUM CHLORIDE 236; 22.74; 6.74; 5.86; 2.97 G/4L; G/4L; G/4L; G/4L; G/4L
POWDER, FOR SOLUTION ORAL
COMMUNITY
Start: 2022-08-30 | End: 2024-01-21

## 2022-09-01 RX ORDER — ONDANSETRON 4 MG/1
TABLET, FILM COATED ORAL
COMMUNITY
Start: 2022-08-30 | End: 2024-01-21

## 2022-09-01 RX ORDER — BISACODYL 5 MG/1
TABLET, DELAYED RELEASE ORAL
COMMUNITY
Start: 2022-08-30 | End: 2024-01-21

## 2022-09-01 NOTE — LETTER
2022         RE: Shana Ventura  [address]  [place] [address]        Dear Colleague,    Thank you for referring your patient, Shana Ventura, to the [facility] [place]. Please see a copy of my visit note below.    HPI: Shana Ventura is a 56 year old female here today for discussion regarding revision to her previous sleeve gastrectomy.  Please see my note from April of this year for further details.  Since her last visit, she has quit smoking again, and notes that some of her mild reflux disease has improved with that.  No overall change in the bilateral knee and back pain.  Weight has been stable over the past 5 months.    Her bariatric comorbidities include history of type 2 diabetes, osteoarthritis of the knees.       Allergies:Codeine sulfate and Penicillins    Past Medical History:   Diagnosis Date     Arthritis     right knee     Diabetes mellitus (H)      Fibromyalgia, primary      GERD (gastroesophageal reflux disease)     after her sleeve     Hypertension      Knee pain      Morbid obesity (H)        Past Surgical History:   Procedure Laterality Date      SECTION       GASTRECTOMY LAPAROSCOPIC SLEEVE      Dr. Nathan     PARTIAL HYSTERECTOMY         CURRENT MEDS:  Prior to Admission medications    Medication Sig Start Date End Date Taking? Authorizing Provider   ACCU-CHEK GUIDE test strip  21  Yes Reported, Patient   acetaminophen (TYLENOL) 500 MG tablet Take 1,000 mg by mouth 3/19/20  Yes Reported, Patient   albuterol (PROAIR HFA/PROVENTIL HFA/VENTOLIN HFA) 108 (90 Base) MCG/ACT inhaler Inhale 1-2 puffs into the lungs 19  Yes Reported, Patient   blood glucose monitoring (ACCU-CHEK FASTCLIX) lancets 2 times daily 21  Yes Reported, Patient   chlorhexidine (PERIDEX) 0.12 % solution SWISH FOR 30 SECONDS THEN SPIT. USE TWICE DAILY FOR 2 WEEKS 22  Yes Reported, Patient   cyclobenzaprine  "(FLEXERIL) 10 MG tablet  1/21/22  Yes Reported, Patient   Dextrose, Diabetic Use, (GLUCOSE) 1 g CHEW Take 4 tablets by mouth   Yes Reported, Patient   hydrochlorothiazide (HYDRODIURIL) 25 MG tablet Take 25 mg by mouth daily 7/2/21  Yes Reported, Patient   minocycline (MINOCIN) 100 MG capsule Take 100 mg by mouth 2 times daily 9/22/21  Yes Reported, Patient   naproxen (NAPROSYN) 500 MG tablet  1/21/22  Yes Reported, Patient   NO ACTIVE MEDICATIONS    Yes Reported, Patient   Prenatal Vit-Fe Fumarate-FA (PRENATAL MULTIVITAMIN W/IRON) 27-0.8 MG tablet Take 2 tablets by mouth daily 1/14/21  Yes Reported, Patient   TRULICITY 1.5 MG/0.5ML pen ADMINISTER 1.5 MG UNDER THE SKIN 1 TIME A WEEK 3/9/22  Yes Reported, Patient   vitamin D2 (ERGOCALCIFEROL) 10438 units (1250 mcg) capsule  7/28/21  Yes Reported, Patient         Social Connections: Not on file       Family History   Problem Relation Age of Onset     Cancer Mother      Diabetes Father      Obesity Sister         reports that she has never smoked. She has never used smokeless tobacco. She reports current alcohol use. She reports that she does not use drugs.    History   Smoking Status     Never Smoker   Smokeless Tobacco     Never Used       Review of Systems -  A complete ROS was reviewed and except for what is listed in the HPI above, all others are negative      /80   Ht 1.702 m (5' 7\")   Wt 128.4 kg (283 lb)   BMI 44.32 kg/m      Wt Readings from Last 4 Encounters:   09/01/22 128.4 kg (283 lb)   06/09/22 126.2 kg (278 lb 3.2 oz)   04/15/22 130 kg (286 lb 9.6 oz)   04/10/22 133.4 kg (294 lb 1.5 oz)        Body mass index is 44.32 kg/m .    EXAM:  GENERAL: This is a well-developed 56 year old female who appears her stated age  HEAD & NECK: Grossly normal.  No visible goiter or scars  CARDIAC: Regular rate and rhythm  CHEST/LUNG: Normal respiratory effort  ABDOMEN: Obese.  No visible hernias or masses appreciated.  LYMPHATIC:  No significant adenopathy " visualized.    EXTREMITIES: Grossly normal.  No evidence of chronic venous stasis.    NEUROLOGIC: Focally intact  INTEGUMENT: No open lesions or ulcers appreciated visually  PSYCHIATRIC: Normal affect. She has a good grasp on the nature of her obesity and the treatment options.    LABS:      Assessment/Plan: 56 year old female who I think would benefit from conversion from her sleeve gastrectomy to a duodenal switch.  We spent the majority of this visit discussing the differences between the duodenal switch and gastric bypass, the risks and benefits of duodenal switch surgery versus gastric bypass with particular regard to her smoking history and NSAID use.  We discussed her preoperative work-up, liquid diet, perioperative course in the hospital and potential long-term complications including malnutrition and absorption of nutrients and vitamins.  She wishes to proceed with a laparoscopic conversion to a duodenal switch.  We will submit for insurance approval.      Brendan Evans MD ,MD  [facility] Department of Surgery      Again, thank you for allowing me to participate in the care of your patient.        Sincerely,        Brendan Evans MD

## 2022-09-01 NOTE — PROGRESS NOTES
HPI: Shana Ventura is a 56 year old female here today for discussion regarding revision to her previous sleeve gastrectomy.  Please see my note from April of this year for further details.  Since her last visit, she has quit smoking again, and notes that some of her mild reflux disease has improved with that.  No overall change in the bilateral knee and back pain.  Weight has been stable over the past 5 months.    Her bariatric comorbidities include history of type 2 diabetes, osteoarthritis of the knees.       Allergies:Codeine sulfate and Penicillins    Past Medical History:   Diagnosis Date     Arthritis     right knee     Diabetes mellitus (H)      Fibromyalgia, primary      GERD (gastroesophageal reflux disease)     after her sleeve     Hypertension      Knee pain      Morbid obesity (H)        Past Surgical History:   Procedure Laterality Date      SECTION       GASTRECTOMY LAPAROSCOPIC SLEEVE      Dr. Nathan     PARTIAL HYSTERECTOMY         CURRENT MEDS:  Prior to Admission medications    Medication Sig Start Date End Date Taking? Authorizing Provider   ACCU-CHEK GUIDE test strip  21  Yes Reported, Patient   acetaminophen (TYLENOL) 500 MG tablet Take 1,000 mg by mouth 3/19/20  Yes Reported, Patient   albuterol (PROAIR HFA/PROVENTIL HFA/VENTOLIN HFA) 108 (90 Base) MCG/ACT inhaler Inhale 1-2 puffs into the lungs 19  Yes Reported, Patient   blood glucose monitoring (ACCU-CHEK FASTCLIX) lancets 2 times daily 21  Yes Reported, Patient   chlorhexidine (PERIDEX) 0.12 % solution SWISH FOR 30 SECONDS THEN SPIT. USE TWICE DAILY FOR 2 WEEKS 22  Yes Reported, Patient   cyclobenzaprine (FLEXERIL) 10 MG tablet  22  Yes Reported, Patient   Dextrose, Diabetic Use, (GLUCOSE) 1 g CHEW Take 4 tablets by mouth   Yes Reported, Patient   hydrochlorothiazide (HYDRODIURIL) 25 MG tablet Take 25 mg by mouth daily 21  Yes Reported, Patient   minocycline (MINOCIN) 100 MG capsule Take 100  "mg by mouth 2 times daily 9/22/21  Yes Reported, Patient   naproxen (NAPROSYN) 500 MG tablet  1/21/22  Yes Reported, Patient   NO ACTIVE MEDICATIONS    Yes Reported, Patient   Prenatal Vit-Fe Fumarate-FA (PRENATAL MULTIVITAMIN W/IRON) 27-0.8 MG tablet Take 2 tablets by mouth daily 1/14/21  Yes Reported, Patient   TRULICITY 1.5 MG/0.5ML pen ADMINISTER 1.5 MG UNDER THE SKIN 1 TIME A WEEK 3/9/22  Yes Reported, Patient   vitamin D2 (ERGOCALCIFEROL) 10558 units (1250 mcg) capsule  7/28/21  Yes Reported, Patient         Social Connections: Not on file       Family History   Problem Relation Age of Onset     Cancer Mother      Diabetes Father      Obesity Sister         reports that she has never smoked. She has never used smokeless tobacco. She reports current alcohol use. She reports that she does not use drugs.    History   Smoking Status     Never Smoker   Smokeless Tobacco     Never Used       Review of Systems -  A complete ROS was reviewed and except for what is listed in the HPI above, all others are negative      /80   Ht 1.702 m (5' 7\")   Wt 128.4 kg (283 lb)   BMI 44.32 kg/m      Wt Readings from Last 4 Encounters:   09/01/22 128.4 kg (283 lb)   06/09/22 126.2 kg (278 lb 3.2 oz)   04/15/22 130 kg (286 lb 9.6 oz)   04/10/22 133.4 kg (294 lb 1.5 oz)        Body mass index is 44.32 kg/m .    EXAM:  GENERAL: This is a well-developed 56 year old female who appears her stated age  HEAD & NECK: Grossly normal.  No visible goiter or scars  CARDIAC: Regular rate and rhythm  CHEST/LUNG: Normal respiratory effort  ABDOMEN: Obese.  No visible hernias or masses appreciated.  LYMPHATIC:  No significant adenopathy visualized.    EXTREMITIES: Grossly normal.  No evidence of chronic venous stasis.    NEUROLOGIC: Focally intact  INTEGUMENT: No open lesions or ulcers appreciated visually  PSYCHIATRIC: Normal affect. She has a good grasp on the nature of her obesity and the treatment options.    LABS:      Assessment/Plan: " 56 year old female who I think would benefit from conversion from her sleeve gastrectomy to a duodenal switch.  We spent the majority of this visit discussing the differences between the duodenal switch and gastric bypass, the risks and benefits of duodenal switch surgery versus gastric bypass with particular regard to her smoking history and NSAID use.  We discussed her preoperative work-up, liquid diet, perioperative course in the hospital and potential long-term complications including malnutrition and absorption of nutrients and vitamins.  She wishes to proceed with a laparoscopic conversion to a duodenal switch.  We will submit for insurance approval.      Brendan Evans MD ,MD  [facility] Department of Surgery

## 2022-09-02 ENCOUNTER — DOCUMENTATION ONLY (OUTPATIENT)
Dept: SURGERY | Facility: CLINIC | Age: 57
End: 2022-09-02

## 2022-09-02 NOTE — TELEPHONE ENCOUNTER
Tasklist updated. Pt had a positive FIT test on 8/23/2022 so will need a colonoscopy.    Tea Clancy RN, CBN  [facility]  P [phone]  F [phone]

## 2022-09-02 NOTE — PROGRESS NOTES
I have submitted a PA to [facility] for this patient to have a revision from her LSG to a BDS with Dr. Brendan Evans

## 2022-09-06 NOTE — TELEPHONE ENCOUNTER
Colonoscopy scheduled for 9/13/2022.    Tea Clancy RN, CBN  [facility]  P [phone]  F [phone]

## 2022-10-07 ENCOUNTER — DOCUMENTATION ONLY (OUTPATIENT)
Dept: SURGERY | Facility: CLINIC | Age: 57
End: 2022-10-07

## 2022-10-07 NOTE — PROGRESS NOTES
I have filed an appeal on behalf of Shana with [employer] [place].  They stated that she needed to have a complication frm her surgery, however, Shana hadn't done her surgery that was approved back in 2019 due to the death of her father and needing to help complete his estate.  Then Covid happened and she didn't feel comfortable and we were actually shut down from scheduling.

## 2022-10-23 ENCOUNTER — HEALTH MAINTENANCE LETTER (OUTPATIENT)
Age: 57
End: 2022-10-23

## 2023-01-10 ENCOUNTER — MEDICAL CORRESPONDENCE (OUTPATIENT)
Dept: HEALTH INFORMATION MANAGEMENT | Facility: CLINIC | Age: 58
End: 2023-01-10

## 2023-01-10 ENCOUNTER — TRANSCRIBE ORDERS (OUTPATIENT)
Dept: OTHER | Age: 58
End: 2023-01-10

## 2023-01-10 DIAGNOSIS — G95.89 CERVICAL SPINAL MASS (H): Primary | ICD-10-CM

## 2023-01-11 ENCOUNTER — TELEPHONE (OUTPATIENT)
Dept: NEUROSURGERY | Facility: CLINIC | Age: 58
End: 2023-01-11
Payer: COMMERCIAL

## 2023-01-12 NOTE — TELEPHONE ENCOUNTER
The following images were recvd and uploaded into PACS  01/11/2023 MR Cervical  01/11/2023 MR Thoracic  12/01/2022 MR Cervical  12/01/2022 MR Thoracic.  Afia Lisa LPN

## 2023-01-12 NOTE — TELEPHONE ENCOUNTER
Referred by:   Dr Yvan [facility]   [address] [address]   Phone: [phone], Fax: [phone]   Please call to schedule your appointment             Order Questions    Question Answer   Preferred Location: [facility]   Scheduling Instructions: Please call to schedule your appointment   My Clinical Question Is: Cervical mass

## 2023-01-12 NOTE — TELEPHONE ENCOUNTER
Care Everywhere accessed for referring notes. I called [facility] and requested the following images:  01/11/2023 MR Cervical  01/11/2023 MR Thoracic  12/01/2022 MR Cervical  12/01/2022 MR Thoracic.    Afia Lisa LPN

## 2023-01-13 ENCOUNTER — TELEPHONE (OUTPATIENT)
Dept: NEUROSURGERY | Facility: CLINIC | Age: 58
End: 2023-01-13

## 2023-01-13 NOTE — TELEPHONE ENCOUNTER
[facility] Call Center    Phone Message    May a detailed message be left on voicemail: yes     Reason for Call: Other: Olivia from [facility] called stating changes were noted less than a month between recent MRI and one prior, Olivia is requesting a timeline for how soon a Neurosurgeon will be able to review the imaging to schedule patient. Olviia states that patient is having more pain and worsening symptoms    Action Taken: Message routed to:  [facility] Neurosurgery    Travel Screening: Not Applicable

## 2023-01-13 NOTE — TELEPHONE ENCOUNTER
[facility] Call Center    Phone Message    May a detailed message be left on voicemail: yes     Reason for Call: Appointment Intake    Referring Provider Name: Dr. 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 Referral on file marked urgent, writer unable to schedule without instructions.    Action Taken: Message routed to:  [facility] Neurosurgery    Travel Screening: Not Applicable

## 2023-01-13 NOTE — TELEPHONE ENCOUNTER
Called patient to let her know we have received her images and they have been sent to a surgeon to review.    Stella Parr

## 2023-01-16 ASSESSMENT — ENCOUNTER SYMPTOMS
MEMORY LOSS: 0
MUSCLE WEAKNESS: 1
JOINT SWELLING: 0
ARTHRALGIAS: 1
LOSS OF CONSCIOUSNESS: 0
HEADACHES: 1
NUMBNESS: 1
BACK PAIN: 1
SEIZURES: 0
NECK PAIN: 1
TINGLING: 1
MYALGIAS: 1
WEAKNESS: 1
TREMORS: 0
MUSCLE CRAMPS: 0
PARALYSIS: 0
DISTURBANCES IN COORDINATION: 0
STIFFNESS: 1
SPEECH CHANGE: 0
DIZZINESS: 1

## 2023-01-16 NOTE — TELEPHONE ENCOUNTER
Action 1/16/23 MV 12.53pm   Action Taken Imaging request faxed to PN for XR images  UPDATE: images resolved in PACS 3.03pm         SPINE PATIENTS - NEW PROTOCOL PREVISIT    RECORDS RECEIVED FROM: external   REASON FOR VISIT: cervical spine mass   Date of Appt: 1/17/23   NOTES (FOR ALL VISITS) STATUS DETAILS   OFFICE NOTE from referring provider Care Everywhere Dr Cricket Luong @  Neurology:  1/9/23 telephone encounter  11/8/22   OFFICE NOTE from other specialist Care Everywhere Dr Tu Rasheed @ [facility]:  1/13/23   EMG REPORT Care Everywhere [facility]:  1/9/23   MEDICATION LIST Care Everywhere    IMAGING  (FOR ALL VISITS)     MRI (HEAD, NECK, SPINE) Received [facility]:  MRI Thoracic Spine 1/11/23  MRI Cervical Spine 1/11/23  MRI Thoracic Spine 12/1/22  MRI Cervical Spine 12/1/22   XRAY (SPINE) *NEUROSURGERY* Received [facility]:  XR Cervical Spine 10/12/22

## 2023-01-17 ENCOUNTER — PRE VISIT (OUTPATIENT)
Dept: NEUROSURGERY | Facility: CLINIC | Age: 58
End: 2023-01-17

## 2023-01-17 ENCOUNTER — OFFICE VISIT (OUTPATIENT)
Dept: NEUROSURGERY | Facility: CLINIC | Age: 58
End: 2023-01-17
Payer: COMMERCIAL

## 2023-01-17 VITALS
OXYGEN SATURATION: 100 % | BODY MASS INDEX: 42.33 KG/M2 | WEIGHT: 279.3 LBS | SYSTOLIC BLOOD PRESSURE: 156 MMHG | DIASTOLIC BLOOD PRESSURE: 92 MMHG | HEART RATE: 62 BPM | HEIGHT: 68 IN

## 2023-01-17 DIAGNOSIS — G95.89 CERVICAL SPINAL MASS (H): ICD-10-CM

## 2023-01-17 PROCEDURE — 99204 OFFICE O/P NEW MOD 45 MIN: CPT | Performed by: NEUROLOGICAL SURGERY

## 2023-01-17 RX ORDER — HYDROCHLOROTHIAZIDE 12.5 MG/1
12.5 TABLET ORAL DAILY
Status: ON HOLD | COMMUNITY
Start: 2022-06-24 | End: 2024-01-23

## 2023-01-17 RX ORDER — TOPIRAMATE 25 MG/1
TABLET, FILM COATED ORAL
COMMUNITY
Start: 2022-11-08 | End: 2024-01-21

## 2023-01-17 ASSESSMENT — PAIN SCALES - GENERAL: PAINLEVEL: SEVERE PAIN (7)

## 2023-01-17 NOTE — PROGRESS NOTES
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    HPI:  Mrs. Ventura is a very pleasant 57-year-old female with past medical history of diabetes, hypertension, GERD and left sided migraine who presents for evaluation of left-sided neck pain and headaches that have started in October 2022.  She reports that the headaches and the neck pain are severe constant and debilitating and do not respond well with pain medications alone.  She was worked up by neurology and was found to have enhancing masses in the left ventral lateral spinal canal extending from C7-T2.  EMG performed showed no evidence of a cervical radiculopathy but there was evidence of a very mild left ulnar mononeuropathy.    Today, she reports that she has continuous left-sided neck pain and headaches and they have been increasing in intensity.  She subjectively endorses some numbness in the inner aspect of the left arm and some subjective left arm weakness, both of which are absent on the examination. She reports that she had tried gabapentin but stopped taking the medication due to side effects. Additionally, she reports some vague right toe numbness.     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 There seem to be some increase in the size of the mass since November (0.9 x 0.6 x 5.1 cm). Her CT CAP shows no evidence of systemic cancers but only shows bilateral axially and inguinal lymphadenopathy. She reports no signs of myelopathy including dropping objects or falling. No recent fevers, chills, nausea, vomiting, chest pain, shortness of breath, and denies headaches, weakness, LOC,  numbness/weakness/paresthesias in extremities, changes in sensation, taste, smell, nor trouble speaking or other neurologic symptoms.     Of note, she was a smoker for 20 years but quit 10 years ago. She is not on antiplatelets and anticoagulants.     PAST MEDICAL HISTORY:   Past Medical History:   Diagnosis Date     Arthritis     right knee     Diabetes mellitus (H)      Fibromyalgia, primary      GERD (gastroesophageal reflux disease)     after her sleeve     Hypertension      Knee pain      Morbid obesity (H)        PAST SURGICAL HISTORY:   Past Surgical History:   Procedure Laterality Date      SECTION       GASTRECTOMY LAPAROSCOPIC SLEEVE  2013    Dr. Nathan     PARTIAL HYSTERECTOMY         FAMILY HISTORY:   Family History   Problem Relation Age of Onset     Cancer Mother      Diabetes Father      Obesity Sister        SOCIAL HISTORY:   Social History     Tobacco Use     Smoking status: Never     Smokeless tobacco: Never   Substance Use Topics     Alcohol use: Yes     Comment: Alcoholic Drinks/day: Special Occ.        MEDICATIONS:  Current Outpatient Medications   Medication Sig Dispense Refill     ACCU-CHEK GUIDE test strip        acetaminophen (TYLENOL) 500 MG tablet Take 1,000 mg by mouth every 6 hours as needed       bisacodyl (DULCOLAX) 5 MG EC tablet Take 4 tablets by mouth once at 5PM the evening before your procedure.       blood glucose monitoring (ACCU-CHEK FASTCLIX) lancets 2 times daily       chlorhexidine (PERIDEX) 0.12 % solution SWISH FOR 30 SECONDS THEN SPIT. USE TWICE DAILY FOR 2 WEEKS       cyclobenzaprine (FLEXERIL) 10 MG tablet Take 10 mg by mouth 2 times daily as needed       hydrochlorothiazide (HYDRODIURIL) 25 MG tablet Take 25 mg by mouth daily       minocycline (MINOCIN) 100 MG capsule Take 100 mg by mouth 2 times daily       naproxen (NAPROSYN) 500 MG tablet Take 500 mg by mouth as needed       NO ACTIVE MEDICATIONS        ondansetron (ZOFRAN) 4 MG tablet        PEG  3350-KCl-NaBcb-NaCl-NaSulf (POLYETHYLENE GLYCOL) 236 g solution        Prenatal Vit-Fe Fumarate-FA (PRENATAL MULTIVITAMIN W/IRON) 27-0.8 MG tablet Take 2 tablets by mouth daily       TRULICITY 1.5 MG/0.5ML pen ADMINISTER 1.5 MG UNDER THE SKIN 1 TIME A WEEK       vitamin D2 (ERGOCALCIFEROL) 57176 units (1250 mcg) capsule Take 50,000 Units by mouth once a week         Allergies:  Allergies   Allergen Reactions     Codeine Sulfate      Penicillins        ROS: 10 point ROS of systems including Constitutional, Eyes, Respiratory, Cardiovascular, Gastroenterology, Genitourinary, Integumentary, Muscularskeletal, Psychiatric were all negative except for pertinent positives noted in my HPI.    Physical exam:   There were no vitals taken for this visit.  General: awake and alert  PULM: breathing comfortably on room air  NEUROLOGIC:  -- Awake; Alert; oriented x 3  -- Follows commands briskly    Motor:  Normal bulk / tone; no tremor, rigidity, or bradykinesia.  No muscle wasting or fasciculations  No Pronator Drift     Delt Bi Tri Hand Flexion/  Extension Iliopsoas Quadriceps Hamstrings Tibialis Anterior Gastroc    C5 C6 C7 C8/T1 L2 L3 L4-S1 L4 S1   R 5 5 5 5 5 5 5 5 5   L 5 5 5 5 5 5 5 5 5     Sensory:  intact to LT x 4 extremities       Reflexes: no clonus       Bi Tri BR Cody Pat Ach Bab     C5-6 C7-8 C6 UMN L2-4 S1 UMN   R 2+ 3+ 3+ Norm 2+ 2+ Norm   L 2+ 3+ 3+ Norm 2+ 2+ Norm      Gait: Normal.     IMAGING:  MRI T Spine 1/11/2023    Enhancing mass which may be mildly increased since prior within the left ventral lateral spinal canal which is likely extradural/epidural extending from C7 to T2 extending into the proximal left neural foramina, causing spinal canal stenosis most pronounced at T1 where there is moderate to severe spinal canal stenosis. Differential diagnosis includes schwannoma, meningioma, and metastasis.     CT CAP 12/03/2022:    Mildly enlarged bilateral axillary and bilateral inguinal lymph nodes       ASSESSMENT:  Mrs. Ventura is a very pleasant 57-year-old female with left-sided neck pain and headaches since october with an enhancing masses in the left ventral lateral spinal canal extending from C7-T2. She also has EMG evidence of a very mild left ulnar mononeuropathy. She has bilateral biceps, triceps hyperreflexia and vague left inner arm numbness. She has no gross signs of myelopathy or radiculopathy.      PLAN:  We discussed the etiology of the pain could be from the extradural epidural mass from C7-T2. The differential diagnosis includes schwannoma, meningioma or metastasis. Given the negative CT CAP, the mass is not likely metastasis. We discussed the natural course of each entity and the possible outcomes. We discussed that she did not need urgent surgical intervention but given the nature of the mass, there is a high likely lopez of tumor growth resulting in neurological deficits including paralysis. She wound need surgical removal of the tumor.     We proposed surgical plan:  Posterior decompression with C7-T2 laminectomies, possible additional levels, possible fusion  With intraop neuro physiological monitoring- peripheral nerve monitoring and intra op ultrasound  Would need intra op O arm with Stealth     We discussed the indications, risks and benefits and potential complications of the surgery. The patient and the family asked very intelligent questions.  Mrs Ventura wants to take some time and discuss more with the family and reach out to schedule the surgery.     She was very happy with the plan.    Brent Gomes MD  Neurosurgery Resident  Pager- [phone]    The patient was seen and discussed with Dr. Gunderson, neurosurgery staff.    Please contact neurosurgery resident on call with questions.    Dial * * [phone], enter [phone] when prompted.     I have seen this patient with the resident and formulated a plan and agree with this note.  AMP

## 2023-01-17 NOTE — PATIENT INSTRUCTIONS
Dr Gunderson has recommended a surgical intervention and you have indicated you would like to take some time to consider your options.  Please take the time you need.  Call our clinic at [phone] once you have made a decision regarding surgery.  If you want to move forward, we will initiate the scheduling process.  Please be aware, our surgeons are currently scheduling 4-6 weeks out.  If you have questions regarding the process, please call Dr Neptali De La Torre's RN Care Coordinator at [phone].

## 2023-06-01 ENCOUNTER — HEALTH MAINTENANCE LETTER (OUTPATIENT)
Age: 58
End: 2023-06-01

## 2023-11-05 ENCOUNTER — HEALTH MAINTENANCE LETTER (OUTPATIENT)
Age: 58
End: 2023-11-05

## 2024-01-14 ENCOUNTER — HEALTH MAINTENANCE LETTER (OUTPATIENT)
Age: 59
End: 2024-01-14

## 2024-01-21 ENCOUNTER — APPOINTMENT (OUTPATIENT)
Dept: CT IMAGING | Facility: CLINIC | Age: 59
End: 2024-01-21
Attending: STUDENT IN AN ORGANIZED HEALTH CARE EDUCATION/TRAINING PROGRAM
Payer: COMMERCIAL

## 2024-01-21 ENCOUNTER — APPOINTMENT (OUTPATIENT)
Dept: MRI IMAGING | Facility: CLINIC | Age: 59
End: 2024-01-21
Attending: STUDENT IN AN ORGANIZED HEALTH CARE EDUCATION/TRAINING PROGRAM
Payer: COMMERCIAL

## 2024-01-21 ENCOUNTER — HOSPITAL ENCOUNTER (OUTPATIENT)
Facility: CLINIC | Age: 59
Setting detail: OBSERVATION
Discharge: HOME | End: 2024-01-23
Attending: STUDENT IN AN ORGANIZED HEALTH CARE EDUCATION/TRAINING PROGRAM | Admitting: STUDENT IN AN ORGANIZED HEALTH CARE EDUCATION/TRAINING PROGRAM
Payer: COMMERCIAL

## 2024-01-21 ENCOUNTER — APPOINTMENT (OUTPATIENT)
Dept: GENERAL RADIOLOGY | Facility: CLINIC | Age: 59
End: 2024-01-21
Attending: STUDENT IN AN ORGANIZED HEALTH CARE EDUCATION/TRAINING PROGRAM
Payer: COMMERCIAL

## 2024-01-21 DIAGNOSIS — I95.9 TRANSIENT HYPOTENSION: ICD-10-CM

## 2024-01-21 DIAGNOSIS — I63.213: Primary | ICD-10-CM

## 2024-01-21 DIAGNOSIS — G93.40 ACUTE ENCEPHALOPATHY: ICD-10-CM

## 2024-01-21 DIAGNOSIS — R82.71 BACTERIURIA: ICD-10-CM

## 2024-01-21 DIAGNOSIS — Z79.4 TYPE 2 DIABETES MELLITUS WITH OTHER SPECIFIED COMPLICATION, WITH LONG-TERM CURRENT USE OF INSULIN (H): ICD-10-CM

## 2024-01-21 DIAGNOSIS — B18.1 CHRONIC VIRAL HEPATITIS B WITHOUT DELTA AGENT AND WITHOUT COMA (H): ICD-10-CM

## 2024-01-21 DIAGNOSIS — E11.69 TYPE 2 DIABETES MELLITUS WITH OTHER SPECIFIED COMPLICATION, WITH LONG-TERM CURRENT USE OF INSULIN (H): ICD-10-CM

## 2024-01-21 DIAGNOSIS — R42 DIZZINESS: ICD-10-CM

## 2024-01-21 DIAGNOSIS — G89.4 CHRONIC PAIN SYNDROME: ICD-10-CM

## 2024-01-21 DIAGNOSIS — U07.1 COVID-19: ICD-10-CM

## 2024-01-21 PROBLEM — Z86.711 HISTORY OF PULMONARY EMBOLISM: Status: ACTIVE | Noted: 2023-03-28

## 2024-01-21 PROBLEM — Z85.72 HISTORY OF NON-HODGKIN'S LYMPHOMA: Status: ACTIVE | Noted: 2024-01-02

## 2024-01-21 LAB
ALBUMIN SERPL BCG-MCNC: 3.8 G/DL (ref 3.5–5.2)
ALBUMIN UR-MCNC: NEGATIVE MG/DL
ALP SERPL-CCNC: 92 U/L (ref 40–150)
ALT SERPL W P-5'-P-CCNC: 19 U/L (ref 0–50)
ANION GAP SERPL CALCULATED.3IONS-SCNC: 10 MMOL/L (ref 7–15)
APPEARANCE UR: CLEAR
APTT PPP: 27 SECONDS (ref 22–38)
AST SERPL W P-5'-P-CCNC: 23 U/L (ref 0–45)
BASOPHILS # BLD AUTO: 0 10E3/UL (ref 0–0.2)
BASOPHILS NFR BLD AUTO: 0 %
BILIRUB DIRECT SERPL-MCNC: <0.2 MG/DL (ref 0–0.3)
BILIRUB SERPL-MCNC: <0.2 MG/DL
BILIRUB UR QL STRIP: NEGATIVE
BUN SERPL-MCNC: 21.4 MG/DL (ref 6–20)
CALCIUM SERPL-MCNC: 9.8 MG/DL (ref 8.6–10)
CHLORIDE SERPL-SCNC: 102 MMOL/L (ref 98–107)
COLOR UR AUTO: YELLOW
CREAT SERPL-MCNC: 1.22 MG/DL (ref 0.51–0.95)
DEPRECATED HCO3 PLAS-SCNC: 27 MMOL/L (ref 22–29)
EGFRCR SERPLBLD CKD-EPI 2021: 52 ML/MIN/1.73M2
EOSINOPHIL # BLD AUTO: 0.1 10E3/UL (ref 0–0.7)
EOSINOPHIL NFR BLD AUTO: 1 %
ERYTHROCYTE [DISTWIDTH] IN BLOOD BY AUTOMATED COUNT: 15.2 % (ref 10–15)
FLUAV RNA SPEC QL NAA+PROBE: NEGATIVE
FLUBV RNA RESP QL NAA+PROBE: NEGATIVE
GLUCOSE BLDC GLUCOMTR-MCNC: 133 MG/DL (ref 70–99)
GLUCOSE SERPL-MCNC: 146 MG/DL (ref 70–99)
GLUCOSE UR STRIP-MCNC: NEGATIVE MG/DL
HCT VFR BLD AUTO: 31.7 % (ref 35–47)
HGB BLD-MCNC: 10.1 G/DL (ref 11.7–15.7)
HGB UR QL STRIP: NEGATIVE
HOLD SPECIMEN: NORMAL
HYALINE CASTS: 5 /LPF
IMM GRANULOCYTES # BLD: 0 10E3/UL
IMM GRANULOCYTES NFR BLD: 1 %
INR PPP: 1.23 (ref 0.85–1.15)
KETONES UR STRIP-MCNC: NEGATIVE MG/DL
LACTATE SERPL-SCNC: 1.1 MMOL/L (ref 0.7–2)
LEUKOCYTE ESTERASE UR QL STRIP: ABNORMAL
LIPASE SERPL-CCNC: 19 U/L (ref 13–60)
LYMPHOCYTES # BLD AUTO: 0.4 10E3/UL (ref 0.8–5.3)
LYMPHOCYTES NFR BLD AUTO: 10 %
MCH RBC QN AUTO: 29.4 PG (ref 26.5–33)
MCHC RBC AUTO-ENTMCNC: 31.9 G/DL (ref 31.5–36.5)
MCV RBC AUTO: 92 FL (ref 78–100)
MONOCYTES # BLD AUTO: 0.3 10E3/UL (ref 0–1.3)
MONOCYTES NFR BLD AUTO: 6 %
MUCOUS THREADS #/AREA URNS LPF: PRESENT /LPF
NEUTROPHILS # BLD AUTO: 3.7 10E3/UL (ref 1.6–8.3)
NEUTROPHILS NFR BLD AUTO: 82 %
NITRATE UR QL: POSITIVE
NRBC # BLD AUTO: 0 10E3/UL
NRBC BLD AUTO-RTO: 0 /100
PH UR STRIP: 5 [PH] (ref 5–7)
PLATELET # BLD AUTO: 203 10E3/UL (ref 150–450)
POTASSIUM SERPL-SCNC: 4.1 MMOL/L (ref 3.4–5.3)
PROT SERPL-MCNC: 6.9 G/DL (ref 6.4–8.3)
RBC # BLD AUTO: 3.43 10E6/UL (ref 3.8–5.2)
RBC URINE: 1 /HPF
RSV RNA SPEC NAA+PROBE: NEGATIVE
SARS-COV-2 RNA RESP QL NAA+PROBE: POSITIVE
SODIUM SERPL-SCNC: 139 MMOL/L (ref 135–145)
SP GR UR STRIP: 1.03 (ref 1–1.03)
SQUAMOUS EPITHELIAL: <1 /HPF
TROPONIN T SERPL HS-MCNC: 6 NG/L
TSH SERPL DL<=0.005 MIU/L-ACNC: 0.75 UIU/ML (ref 0.3–4.2)
UROBILINOGEN UR STRIP-MCNC: NORMAL MG/DL
WBC # BLD AUTO: 4.5 10E3/UL (ref 4–11)
WBC URINE: 17 /HPF

## 2024-01-21 PROCEDURE — 87149 DNA/RNA DIRECT PROBE: CPT | Performed by: STUDENT IN AN ORGANIZED HEALTH CARE EDUCATION/TRAINING PROGRAM

## 2024-01-21 PROCEDURE — 83605 ASSAY OF LACTIC ACID: CPT | Performed by: STUDENT IN AN ORGANIZED HEALTH CARE EDUCATION/TRAINING PROGRAM

## 2024-01-21 PROCEDURE — 70450 CT HEAD/BRAIN W/O DYE: CPT

## 2024-01-21 PROCEDURE — 83690 ASSAY OF LIPASE: CPT | Performed by: STUDENT IN AN ORGANIZED HEALTH CARE EDUCATION/TRAINING PROGRAM

## 2024-01-21 PROCEDURE — 96361 HYDRATE IV INFUSION ADD-ON: CPT

## 2024-01-21 PROCEDURE — 85025 COMPLETE CBC W/AUTO DIFF WBC: CPT | Performed by: STUDENT IN AN ORGANIZED HEALTH CARE EDUCATION/TRAINING PROGRAM

## 2024-01-21 PROCEDURE — 258N000003 HC RX IP 258 OP 636: Performed by: STUDENT IN AN ORGANIZED HEALTH CARE EDUCATION/TRAINING PROGRAM

## 2024-01-21 PROCEDURE — 87186 SC STD MICRODIL/AGAR DIL: CPT | Performed by: STUDENT IN AN ORGANIZED HEALTH CARE EDUCATION/TRAINING PROGRAM

## 2024-01-21 PROCEDURE — 87340 HEPATITIS B SURFACE AG IA: CPT | Performed by: STUDENT IN AN ORGANIZED HEALTH CARE EDUCATION/TRAINING PROGRAM

## 2024-01-21 PROCEDURE — 250N000009 HC RX 250: Performed by: STUDENT IN AN ORGANIZED HEALTH CARE EDUCATION/TRAINING PROGRAM

## 2024-01-21 PROCEDURE — G0378 HOSPITAL OBSERVATION PER HR: HCPCS

## 2024-01-21 PROCEDURE — 86706 HEP B SURFACE ANTIBODY: CPT | Performed by: STUDENT IN AN ORGANIZED HEALTH CARE EDUCATION/TRAINING PROGRAM

## 2024-01-21 PROCEDURE — 85730 THROMBOPLASTIN TIME PARTIAL: CPT | Performed by: STUDENT IN AN ORGANIZED HEALTH CARE EDUCATION/TRAINING PROGRAM

## 2024-01-21 PROCEDURE — 70553 MRI BRAIN STEM W/O & W/DYE: CPT

## 2024-01-21 PROCEDURE — 84443 ASSAY THYROID STIM HORMONE: CPT | Performed by: STUDENT IN AN ORGANIZED HEALTH CARE EDUCATION/TRAINING PROGRAM

## 2024-01-21 PROCEDURE — 96375 TX/PRO/DX INJ NEW DRUG ADDON: CPT | Mod: 59

## 2024-01-21 PROCEDURE — 81001 URINALYSIS AUTO W/SCOPE: CPT | Performed by: STUDENT IN AN ORGANIZED HEALTH CARE EDUCATION/TRAINING PROGRAM

## 2024-01-21 PROCEDURE — 80048 BASIC METABOLIC PNL TOTAL CA: CPT | Performed by: STUDENT IN AN ORGANIZED HEALTH CARE EDUCATION/TRAINING PROGRAM

## 2024-01-21 PROCEDURE — 82962 GLUCOSE BLOOD TEST: CPT

## 2024-01-21 PROCEDURE — 250N000011 HC RX IP 250 OP 636: Performed by: STUDENT IN AN ORGANIZED HEALTH CARE EDUCATION/TRAINING PROGRAM

## 2024-01-21 PROCEDURE — 70496 CT ANGIOGRAPHY HEAD: CPT

## 2024-01-21 PROCEDURE — 87637 SARSCOV2&INF A&B&RSV AMP PRB: CPT | Performed by: STUDENT IN AN ORGANIZED HEALTH CARE EDUCATION/TRAINING PROGRAM

## 2024-01-21 PROCEDURE — 87086 URINE CULTURE/COLONY COUNT: CPT | Performed by: STUDENT IN AN ORGANIZED HEALTH CARE EDUCATION/TRAINING PROGRAM

## 2024-01-21 PROCEDURE — 250N000011 HC RX IP 250 OP 636: Performed by: EMERGENCY MEDICINE

## 2024-01-21 PROCEDURE — 86705 HEP B CORE ANTIBODY IGM: CPT | Performed by: STUDENT IN AN ORGANIZED HEALTH CARE EDUCATION/TRAINING PROGRAM

## 2024-01-21 PROCEDURE — 250N000011 HC RX IP 250 OP 636: Mod: JZ

## 2024-01-21 PROCEDURE — 70549 MR ANGIOGRAPH NECK W/O&W/DYE: CPT

## 2024-01-21 PROCEDURE — 96365 THER/PROPH/DIAG IV INF INIT: CPT

## 2024-01-21 PROCEDURE — 36415 COLL VENOUS BLD VENIPUNCTURE: CPT | Performed by: STUDENT IN AN ORGANIZED HEALTH CARE EDUCATION/TRAINING PROGRAM

## 2024-01-21 PROCEDURE — 82248 BILIRUBIN DIRECT: CPT | Performed by: STUDENT IN AN ORGANIZED HEALTH CARE EDUCATION/TRAINING PROGRAM

## 2024-01-21 PROCEDURE — 93005 ELECTROCARDIOGRAM TRACING: CPT

## 2024-01-21 PROCEDURE — 86704 HEP B CORE ANTIBODY TOTAL: CPT | Performed by: STUDENT IN AN ORGANIZED HEALTH CARE EDUCATION/TRAINING PROGRAM

## 2024-01-21 PROCEDURE — 99223 1ST HOSP IP/OBS HIGH 75: CPT | Performed by: STUDENT IN AN ORGANIZED HEALTH CARE EDUCATION/TRAINING PROGRAM

## 2024-01-21 PROCEDURE — 83036 HEMOGLOBIN GLYCOSYLATED A1C: CPT | Performed by: STUDENT IN AN ORGANIZED HEALTH CARE EDUCATION/TRAINING PROGRAM

## 2024-01-21 PROCEDURE — 85610 PROTHROMBIN TIME: CPT | Performed by: STUDENT IN AN ORGANIZED HEALTH CARE EDUCATION/TRAINING PROGRAM

## 2024-01-21 PROCEDURE — 71046 X-RAY EXAM CHEST 2 VIEWS: CPT

## 2024-01-21 PROCEDURE — 84484 ASSAY OF TROPONIN QUANT: CPT | Performed by: STUDENT IN AN ORGANIZED HEALTH CARE EDUCATION/TRAINING PROGRAM

## 2024-01-21 PROCEDURE — 99285 EMERGENCY DEPT VISIT HI MDM: CPT | Mod: 25

## 2024-01-21 RX ORDER — IOPAMIDOL 755 MG/ML
500 INJECTION, SOLUTION INTRAVASCULAR ONCE
Status: COMPLETED | OUTPATIENT
Start: 2024-01-21 | End: 2024-01-21

## 2024-01-21 RX ORDER — PREGABALIN 200 MG/1
200 CAPSULE ORAL 2 TIMES DAILY
COMMUNITY

## 2024-01-21 RX ORDER — TRAZODONE HYDROCHLORIDE 50 MG/1
50 TABLET, FILM COATED ORAL
COMMUNITY

## 2024-01-21 RX ORDER — HYDROXYZINE HYDROCHLORIDE 25 MG/1
1-2 TABLET, FILM COATED ORAL EVERY 6 HOURS PRN
COMMUNITY

## 2024-01-21 RX ORDER — OXYCODONE HYDROCHLORIDE 5 MG/1
5-10 TABLET ORAL EVERY 4 HOURS PRN
COMMUNITY

## 2024-01-21 RX ORDER — SERTRALINE HYDROCHLORIDE 100 MG/1
200 TABLET, FILM COATED ORAL DAILY
COMMUNITY

## 2024-01-21 RX ORDER — FENTANYL 25 UG/1
1 PATCH TRANSDERMAL
COMMUNITY

## 2024-01-21 RX ORDER — GADOBUTROL 604.72 MG/ML
10 INJECTION INTRAVENOUS ONCE
Status: COMPLETED | OUTPATIENT
Start: 2024-01-21 | End: 2024-01-22

## 2024-01-21 RX ORDER — LORATADINE 10 MG/1
10 TABLET ORAL DAILY
COMMUNITY

## 2024-01-21 RX ORDER — DOXYCYCLINE 100 MG/1
100 CAPSULE ORAL 2 TIMES DAILY
COMMUNITY
End: 2024-02-01

## 2024-01-21 RX ORDER — NALOXONE HYDROCHLORIDE 0.4 MG/ML
0.4 INJECTION, SOLUTION INTRAMUSCULAR; INTRAVENOUS; SUBCUTANEOUS ONCE
Status: COMPLETED | OUTPATIENT
Start: 2024-01-21 | End: 2024-01-21

## 2024-01-21 RX ORDER — DOCUSATE SODIUM 100 MG/1
100 CAPSULE, LIQUID FILLED ORAL 2 TIMES DAILY
COMMUNITY

## 2024-01-21 RX ORDER — ENTECAVIR 0.5 MG/1
0.5 TABLET, FILM COATED ORAL DAILY
Status: ON HOLD | COMMUNITY
End: 2024-01-23

## 2024-01-21 RX ORDER — LANOLIN ALCOHOL/MO/W.PET/CERES
3 CREAM (GRAM) TOPICAL AT BEDTIME
COMMUNITY

## 2024-01-21 RX ORDER — LORAZEPAM 2 MG/ML
1 INJECTION INTRAMUSCULAR ONCE
Status: COMPLETED | OUTPATIENT
Start: 2024-01-21 | End: 2024-01-21

## 2024-01-21 RX ORDER — NALOXONE HYDROCHLORIDE 0.4 MG/ML
INJECTION, SOLUTION INTRAMUSCULAR; INTRAVENOUS; SUBCUTANEOUS
Status: COMPLETED
Start: 2024-01-21 | End: 2024-01-21

## 2024-01-21 RX ORDER — SEMAGLUTIDE 2.68 MG/ML
2 INJECTION, SOLUTION SUBCUTANEOUS
COMMUNITY

## 2024-01-21 RX ORDER — CEFTRIAXONE 2 G/1
2 INJECTION, POWDER, FOR SOLUTION INTRAMUSCULAR; INTRAVENOUS ONCE
Status: COMPLETED | OUTPATIENT
Start: 2024-01-21 | End: 2024-01-21

## 2024-01-21 RX ORDER — FLUTICASONE PROPIONATE 50 MCG
2 SPRAY, SUSPENSION (ML) NASAL DAILY
COMMUNITY

## 2024-01-21 RX ADMIN — IOPAMIDOL 67 ML: 755 INJECTION, SOLUTION INTRAVENOUS at 19:55

## 2024-01-21 RX ADMIN — LORAZEPAM 1 MG: 2 INJECTION INTRAMUSCULAR; INTRAVENOUS at 23:56

## 2024-01-21 RX ADMIN — SODIUM CHLORIDE 80 ML: 9 INJECTION, SOLUTION INTRAVENOUS at 19:56

## 2024-01-21 RX ADMIN — SODIUM CHLORIDE 1000 ML: 9 INJECTION, SOLUTION INTRAVENOUS at 19:27

## 2024-01-21 RX ADMIN — SODIUM CHLORIDE 1000 ML: 9 INJECTION, SOLUTION INTRAVENOUS at 21:06

## 2024-01-21 RX ADMIN — NALOXONE HYDROCHLORIDE 0.4 MG: 0.4 INJECTION, SOLUTION INTRAMUSCULAR; INTRAVENOUS; SUBCUTANEOUS at 19:11

## 2024-01-21 RX ADMIN — CEFTRIAXONE 2 G: 2 INJECTION, POWDER, FOR SOLUTION INTRAMUSCULAR; INTRAVENOUS at 22:29

## 2024-01-21 ASSESSMENT — ACTIVITIES OF DAILY LIVING (ADL)
ADLS_ACUITY_SCORE: 35

## 2024-01-22 ENCOUNTER — APPOINTMENT (OUTPATIENT)
Dept: CT IMAGING | Facility: CLINIC | Age: 59
End: 2024-01-22
Attending: NURSE PRACTITIONER
Payer: COMMERCIAL

## 2024-01-22 ENCOUNTER — APPOINTMENT (OUTPATIENT)
Dept: CARDIOLOGY | Facility: CLINIC | Age: 59
End: 2024-01-22
Attending: NURSE PRACTITIONER
Payer: COMMERCIAL

## 2024-01-22 LAB
ALBUMIN SERPL BCG-MCNC: 3.9 G/DL (ref 3.5–5.2)
ALP SERPL-CCNC: 91 U/L (ref 40–150)
ALT SERPL W P-5'-P-CCNC: 17 U/L (ref 0–50)
ANION GAP SERPL CALCULATED.3IONS-SCNC: 12 MMOL/L (ref 7–15)
AST SERPL W P-5'-P-CCNC: 24 U/L (ref 0–45)
ATRIAL RATE - MUSE: 59 BPM
BILIRUB SERPL-MCNC: <0.2 MG/DL
BUN SERPL-MCNC: 15.3 MG/DL (ref 6–20)
CALCIUM SERPL-MCNC: 9.2 MG/DL (ref 8.6–10)
CHLORIDE SERPL-SCNC: 107 MMOL/L (ref 98–107)
CHOLEST SERPL-MCNC: 162 MG/DL
CREAT SERPL-MCNC: 0.9 MG/DL (ref 0.51–0.95)
DEPRECATED HCO3 PLAS-SCNC: 24 MMOL/L (ref 22–29)
DIASTOLIC BLOOD PRESSURE - MUSE: NORMAL MMHG
EGFRCR SERPLBLD CKD-EPI 2021: 75 ML/MIN/1.73M2
ERYTHROCYTE [DISTWIDTH] IN BLOOD BY AUTOMATED COUNT: 15.3 % (ref 10–15)
FASTING STATUS PATIENT QL REPORTED: NO
GLUCOSE BLDC GLUCOMTR-MCNC: 100 MG/DL (ref 70–99)
GLUCOSE BLDC GLUCOMTR-MCNC: 101 MG/DL (ref 70–99)
GLUCOSE BLDC GLUCOMTR-MCNC: 112 MG/DL (ref 70–99)
GLUCOSE BLDC GLUCOMTR-MCNC: 147 MG/DL (ref 70–99)
GLUCOSE BLDC GLUCOMTR-MCNC: 177 MG/DL (ref 70–99)
GLUCOSE BLDC GLUCOMTR-MCNC: 64 MG/DL (ref 70–99)
GLUCOSE BLDC GLUCOMTR-MCNC: 86 MG/DL (ref 70–99)
GLUCOSE SERPL-MCNC: 158 MG/DL (ref 70–99)
HBA1C MFR BLD: 7.5 %
HBV CORE AB SERPL QL IA: REACTIVE
HBV CORE IGM SERPL QL IA: NONREACTIVE
HBV SURFACE AB SERPL IA-ACNC: <3.5 M[IU]/ML
HBV SURFACE AB SERPL IA-ACNC: NONREACTIVE M[IU]/ML
HBV SURFACE AG SERPL QL IA: NONREACTIVE
HCT VFR BLD AUTO: 30.7 % (ref 35–47)
HDLC SERPL-MCNC: 42 MG/DL
HGB BLD-MCNC: 9.8 G/DL (ref 11.7–15.7)
INTERPRETATION ECG - MUSE: NORMAL
LDLC SERPL CALC-MCNC: 84 MG/DL
LVEF ECHO: NORMAL
MCH RBC QN AUTO: 29.5 PG (ref 26.5–33)
MCHC RBC AUTO-ENTMCNC: 31.9 G/DL (ref 31.5–36.5)
MCV RBC AUTO: 93 FL (ref 78–100)
NONHDLC SERPL-MCNC: 120 MG/DL
P AXIS - MUSE: 30 DEGREES
PLATELET # BLD AUTO: 223 10E3/UL (ref 150–450)
POTASSIUM SERPL-SCNC: 3.9 MMOL/L (ref 3.4–5.3)
PR INTERVAL - MUSE: 200 MS
PROT SERPL-MCNC: 6.7 G/DL (ref 6.4–8.3)
QRS DURATION - MUSE: 86 MS
QT - MUSE: 462 MS
QTC - MUSE: 457 MS
R AXIS - MUSE: -1 DEGREES
RBC # BLD AUTO: 3.32 10E6/UL (ref 3.8–5.2)
SODIUM SERPL-SCNC: 143 MMOL/L (ref 135–145)
SYSTOLIC BLOOD PRESSURE - MUSE: NORMAL MMHG
T AXIS - MUSE: 6 DEGREES
TRIGL SERPL-MCNC: 178 MG/DL
TROPONIN T SERPL HS-MCNC: 7 NG/L
VENTRICULAR RATE- MUSE: 59 BPM
WBC # BLD AUTO: 4.1 10E3/UL (ref 4–11)

## 2024-01-22 PROCEDURE — 250N000012 HC RX MED GY IP 250 OP 636 PS 637: Performed by: STUDENT IN AN ORGANIZED HEALTH CARE EDUCATION/TRAINING PROGRAM

## 2024-01-22 PROCEDURE — 255N000002 HC RX 255 OP 636: Performed by: STUDENT IN AN ORGANIZED HEALTH CARE EDUCATION/TRAINING PROGRAM

## 2024-01-22 PROCEDURE — 250N000013 HC RX MED GY IP 250 OP 250 PS 637: Performed by: PHYSICIAN ASSISTANT

## 2024-01-22 PROCEDURE — 36415 COLL VENOUS BLD VENIPUNCTURE: CPT | Performed by: STUDENT IN AN ORGANIZED HEALTH CARE EDUCATION/TRAINING PROGRAM

## 2024-01-22 PROCEDURE — G0378 HOSPITAL OBSERVATION PER HR: HCPCS

## 2024-01-22 PROCEDURE — 250N000013 HC RX MED GY IP 250 OP 250 PS 637: Performed by: STUDENT IN AN ORGANIZED HEALTH CARE EDUCATION/TRAINING PROGRAM

## 2024-01-22 PROCEDURE — 250N000011 HC RX IP 250 OP 636: Performed by: STUDENT IN AN ORGANIZED HEALTH CARE EDUCATION/TRAINING PROGRAM

## 2024-01-22 PROCEDURE — 85041 AUTOMATED RBC COUNT: CPT | Performed by: STUDENT IN AN ORGANIZED HEALTH CARE EDUCATION/TRAINING PROGRAM

## 2024-01-22 PROCEDURE — 99233 SBSQ HOSP IP/OBS HIGH 50: CPT | Performed by: PHYSICIAN ASSISTANT

## 2024-01-22 PROCEDURE — 93306 TTE W/DOPPLER COMPLETE: CPT | Mod: 26 | Performed by: INTERNAL MEDICINE

## 2024-01-22 PROCEDURE — 84484 ASSAY OF TROPONIN QUANT: CPT | Performed by: PHYSICIAN ASSISTANT

## 2024-01-22 PROCEDURE — 999N000208 ECHOCARDIOGRAM COMPLETE

## 2024-01-22 PROCEDURE — A9585 GADOBUTROL INJECTION: HCPCS | Performed by: STUDENT IN AN ORGANIZED HEALTH CARE EDUCATION/TRAINING PROGRAM

## 2024-01-22 PROCEDURE — 82962 GLUCOSE BLOOD TEST: CPT

## 2024-01-22 PROCEDURE — 250N000009 HC RX 250: Performed by: STUDENT IN AN ORGANIZED HEALTH CARE EDUCATION/TRAINING PROGRAM

## 2024-01-22 PROCEDURE — 71260 CT THORAX DX C+: CPT

## 2024-01-22 PROCEDURE — 258N000001 HC RX 258: Performed by: STUDENT IN AN ORGANIZED HEALTH CARE EDUCATION/TRAINING PROGRAM

## 2024-01-22 PROCEDURE — 80053 COMPREHEN METABOLIC PANEL: CPT | Performed by: STUDENT IN AN ORGANIZED HEALTH CARE EDUCATION/TRAINING PROGRAM

## 2024-01-22 PROCEDURE — G0427 INPT/ED TELECONSULT70: HCPCS | Mod: G0 | Performed by: NURSE PRACTITIONER

## 2024-01-22 PROCEDURE — 82465 ASSAY BLD/SERUM CHOLESTEROL: CPT | Performed by: PHYSICIAN ASSISTANT

## 2024-01-22 RX ORDER — ACETAMINOPHEN 325 MG/1
650 TABLET ORAL EVERY 8 HOURS PRN
Status: COMPLETED | OUTPATIENT
Start: 2024-01-22 | End: 2024-01-23

## 2024-01-22 RX ORDER — ENTECAVIR 0.5 MG/1
0.5 TABLET, FILM COATED ORAL DAILY
Status: DISCONTINUED | OUTPATIENT
Start: 2024-01-22 | End: 2024-01-23 | Stop reason: HOSPADM

## 2024-01-22 RX ORDER — AMOXICILLIN 250 MG
1 CAPSULE ORAL 2 TIMES DAILY PRN
Status: DISCONTINUED | OUTPATIENT
Start: 2024-01-22 | End: 2024-01-23 | Stop reason: HOSPADM

## 2024-01-22 RX ORDER — ACYCLOVIR 200 MG/1
30 CAPSULE ORAL ONCE
Status: COMPLETED | OUTPATIENT
Start: 2024-01-22 | End: 2024-01-22

## 2024-01-22 RX ORDER — TRAZODONE HYDROCHLORIDE 50 MG/1
50 TABLET, FILM COATED ORAL
Status: DISCONTINUED | OUTPATIENT
Start: 2024-01-22 | End: 2024-01-23 | Stop reason: HOSPADM

## 2024-01-22 RX ORDER — DOXYCYCLINE 100 MG/1
100 CAPSULE ORAL 2 TIMES DAILY
Status: DISCONTINUED | OUTPATIENT
Start: 2024-01-22 | End: 2024-01-23 | Stop reason: HOSPADM

## 2024-01-22 RX ORDER — ONDANSETRON 4 MG/1
4 TABLET, ORALLY DISINTEGRATING ORAL EVERY 6 HOURS PRN
Status: DISCONTINUED | OUTPATIENT
Start: 2024-01-22 | End: 2024-01-23 | Stop reason: HOSPADM

## 2024-01-22 RX ORDER — IOPAMIDOL 755 MG/ML
500 INJECTION, SOLUTION INTRAVASCULAR ONCE
Status: COMPLETED | OUTPATIENT
Start: 2024-01-22 | End: 2024-01-22

## 2024-01-22 RX ORDER — NICOTINE POLACRILEX 4 MG
15-30 LOZENGE BUCCAL
Status: DISCONTINUED | OUTPATIENT
Start: 2024-01-22 | End: 2024-01-23 | Stop reason: HOSPADM

## 2024-01-22 RX ORDER — PREGABALIN 100 MG/1
200 CAPSULE ORAL 2 TIMES DAILY
Status: DISCONTINUED | OUTPATIENT
Start: 2024-01-22 | End: 2024-01-23 | Stop reason: HOSPADM

## 2024-01-22 RX ORDER — OXYCODONE HYDROCHLORIDE 5 MG/1
5 TABLET ORAL EVERY 4 HOURS PRN
Status: DISCONTINUED | OUTPATIENT
Start: 2024-01-22 | End: 2024-01-23 | Stop reason: HOSPADM

## 2024-01-22 RX ORDER — NALOXONE HYDROCHLORIDE 0.4 MG/ML
0.4 INJECTION, SOLUTION INTRAMUSCULAR; INTRAVENOUS; SUBCUTANEOUS
Status: DISCONTINUED | OUTPATIENT
Start: 2024-01-22 | End: 2024-01-23 | Stop reason: HOSPADM

## 2024-01-22 RX ORDER — FENTANYL 25 UG/1
25 PATCH TRANSDERMAL
Status: DISCONTINUED | OUTPATIENT
Start: 2024-01-22 | End: 2024-01-23 | Stop reason: HOSPADM

## 2024-01-22 RX ORDER — NALOXONE HYDROCHLORIDE 0.4 MG/ML
0.2 INJECTION, SOLUTION INTRAMUSCULAR; INTRAVENOUS; SUBCUTANEOUS
Status: DISCONTINUED | OUTPATIENT
Start: 2024-01-22 | End: 2024-01-23 | Stop reason: HOSPADM

## 2024-01-22 RX ORDER — SERTRALINE HYDROCHLORIDE 100 MG/1
200 TABLET, FILM COATED ORAL DAILY
Status: DISCONTINUED | OUTPATIENT
Start: 2024-01-22 | End: 2024-01-23 | Stop reason: HOSPADM

## 2024-01-22 RX ORDER — ONDANSETRON 2 MG/ML
4 INJECTION INTRAMUSCULAR; INTRAVENOUS EVERY 6 HOURS PRN
Status: DISCONTINUED | OUTPATIENT
Start: 2024-01-22 | End: 2024-01-23 | Stop reason: HOSPADM

## 2024-01-22 RX ORDER — HYDROXYZINE HYDROCHLORIDE 25 MG/1
25-50 TABLET, FILM COATED ORAL EVERY 6 HOURS PRN
Status: DISCONTINUED | OUTPATIENT
Start: 2024-01-22 | End: 2024-01-23 | Stop reason: HOSPADM

## 2024-01-22 RX ORDER — AMOXICILLIN 250 MG
2 CAPSULE ORAL 2 TIMES DAILY PRN
Status: DISCONTINUED | OUTPATIENT
Start: 2024-01-22 | End: 2024-01-23 | Stop reason: HOSPADM

## 2024-01-22 RX ORDER — DEXTROSE MONOHYDRATE 25 G/50ML
25-50 INJECTION, SOLUTION INTRAVENOUS
Status: DISCONTINUED | OUTPATIENT
Start: 2024-01-22 | End: 2024-01-23 | Stop reason: HOSPADM

## 2024-01-22 RX ORDER — DOCUSATE SODIUM 100 MG/1
100 CAPSULE, LIQUID FILLED ORAL 2 TIMES DAILY
Status: DISCONTINUED | OUTPATIENT
Start: 2024-01-22 | End: 2024-01-23 | Stop reason: HOSPADM

## 2024-01-22 RX ORDER — CYCLOBENZAPRINE HCL 10 MG
10 TABLET ORAL 3 TIMES DAILY PRN
Status: DISCONTINUED | OUTPATIENT
Start: 2024-01-22 | End: 2024-01-23 | Stop reason: HOSPADM

## 2024-01-22 RX ADMIN — APIXABAN 5 MG: 5 TABLET, FILM COATED ORAL at 20:42

## 2024-01-22 RX ADMIN — ONDANSETRON 4 MG: 4 TABLET, ORALLY DISINTEGRATING ORAL at 10:51

## 2024-01-22 RX ADMIN — FENTANYL 1 PATCH: 25 PATCH TRANSDERMAL at 13:58

## 2024-01-22 RX ADMIN — DOXYCYCLINE HYCLATE 100 MG: 100 CAPSULE ORAL at 20:42

## 2024-01-22 RX ADMIN — PREGABALIN 200 MG: 100 CAPSULE ORAL at 20:41

## 2024-01-22 RX ADMIN — INSULIN ASPART 1 UNITS: 100 INJECTION, SOLUTION INTRAVENOUS; SUBCUTANEOUS at 05:55

## 2024-01-22 RX ADMIN — ENTECAVIR 0.5 MG: 0.5 TABLET ORAL at 08:48

## 2024-01-22 RX ADMIN — OXYCODONE HYDROCHLORIDE 5 MG: 5 TABLET ORAL at 18:57

## 2024-01-22 RX ADMIN — OXYCODONE HYDROCHLORIDE 5 MG: 5 TABLET ORAL at 23:49

## 2024-01-22 RX ADMIN — GADOBUTROL 10 ML: 604.72 INJECTION INTRAVENOUS at 00:22

## 2024-01-22 RX ADMIN — IOPAMIDOL 100 ML: 755 INJECTION, SOLUTION INTRAVENOUS at 16:26

## 2024-01-22 RX ADMIN — SODIUM CHLORIDE 100 ML: 9 INJECTION, SOLUTION INTRAVENOUS at 16:26

## 2024-01-22 RX ADMIN — ACETAMINOPHEN 650 MG: 325 TABLET, FILM COATED ORAL at 20:41

## 2024-01-22 RX ADMIN — DOCUSATE SODIUM 100 MG: 100 CAPSULE, LIQUID FILLED ORAL at 20:42

## 2024-01-22 RX ADMIN — SERTRALINE HYDROCHLORIDE 200 MG: 100 TABLET ORAL at 08:47

## 2024-01-22 RX ADMIN — INSULIN GLARGINE 10 UNITS: 100 INJECTION, SOLUTION SUBCUTANEOUS at 02:15

## 2024-01-22 RX ADMIN — HUMAN ALBUMIN MICROSPHERES AND PERFLUTREN 2 ML: 10; .22 INJECTION, SOLUTION INTRAVENOUS at 11:40

## 2024-01-22 RX ADMIN — SODIUM CHLORIDE 30 ML: 9 INJECTION INTRAMUSCULAR; INTRAVENOUS; SUBCUTANEOUS at 11:41

## 2024-01-22 ASSESSMENT — ACTIVITIES OF DAILY LIVING (ADL)
ADLS_ACUITY_SCORE: 22
ADLS_ACUITY_SCORE: 20
ADLS_ACUITY_SCORE: 22
ADLS_ACUITY_SCORE: 20
ADLS_ACUITY_SCORE: 35
ADLS_ACUITY_SCORE: 20
ADLS_ACUITY_SCORE: 22
ADLS_ACUITY_SCORE: 20
ADLS_ACUITY_SCORE: 22
DEPENDENT_IADLS:: INDEPENDENT
ADLS_ACUITY_SCORE: 22

## 2024-01-22 ASSESSMENT — COLUMBIA-SUICIDE SEVERITY RATING SCALE - C-SSRS
2. HAVE YOU ACTUALLY HAD ANY THOUGHTS OF KILLING YOURSELF IN THE PAST MONTH?: NO
6. HAVE YOU EVER DONE ANYTHING, STARTED TO DO ANYTHING, OR PREPARED TO DO ANYTHING TO END YOUR LIFE?: NO
1. IN THE PAST MONTH, HAVE YOU WISHED YOU WERE DEAD OR WISHED YOU COULD GO TO SLEEP AND NOT WAKE UP?: NO

## 2024-01-22 NOTE — ED PROVIDER NOTES
History     Chief Complaint:  Nausea & Vomiting and Dizziness       HPI   Shana Ventura is a 58 year old female who presents by EMS for sudden onset nausea vomiting and dizziness with slurred speech.  Patient was with her family at a baby shower today.  Woke up feeling normal other than having a sinus infection finishing a course of antibiotics.  Daughter thinks that the patient took her pain medication prior to attending the shower.  No EtOH intake that they know of.  Patient denies chest pain, shortness of breath, abdominal pain, nausea, vomiting, headaches, numbness or tingling.  Family states this is wildly abnormal from her baseline.    She is on Eliquis for recurrent pulmonary embolisms in the setting of having CLL.      Independent Historian:   Daughter - They report history as above    Review of External Notes:   None      Medications:    ACCU-CHEK GUIDE test strip  acetaminophen (TYLENOL) 500 MG tablet  apixaban ANTICOAGULANT (ELIQUIS) 5 MG tablet  blood glucose monitoring (ACCU-CHEK FASTCLIX) lancets  cyclobenzaprine (FLEXERIL) 10 MG tablet  docusate sodium (COLACE) 100 MG capsule  doxycycline hyclate (VIBRAMYCIN) 100 MG capsule  entecavir (BARACLUDE) 0.5 MG tablet  fentaNYL (DURAGESIC) 25 mcg/hr 72 hr patch  fluticasone (FLONASE) 50 MCG/ACT nasal spray  hydrochlorothiazide (HYDRODIURIL) 12.5 MG tablet  hydrOXYzine HCl (ATARAX) 25 MG tablet  insulin glargine (LANTUS PEN) 100 UNIT/ML pen  loratadine (CLARITIN) 10 MG tablet  melatonin 3 MG tablet  naproxen (NAPROSYN) 500 MG tablet  oxyCODONE (ROXICODONE) 5 MG tablet  Pregabalin (LYRICA) 200 MG capsule  Prenatal 27-1 MG TABS  Semaglutide, 2 MG/DOSE, (OZEMPIC, 2 MG/DOSE,) 8 MG/3ML pen  sertraline (ZOLOFT) 100 MG tablet  traZODone (DESYREL) 50 MG tablet  vitamin D2 (ERGOCALCIFEROL) 97989 units (1250 mcg) capsule        Past Medical History:    Past Medical History:   Diagnosis Date    Arthritis     Diabetes mellitus (H)     Fibromyalgia, primary     GERD  "(gastroesophageal reflux disease)     Hypertension     Knee pain     Morbid obesity (H)        Past Surgical History:    Past Surgical History:   Procedure Laterality Date     SECTION      GASTRECTOMY LAPAROSCOPIC SLEEVE  2013    Dr. Nathan    IR LYMPH NODE BIOPSY  10/4/2021    PARTIAL HYSTERECTOMY          Physical Exam   Patient Vitals for the past 24 hrs:   BP Temp Temp src Pulse Resp SpO2 Height Weight   241 -- -- -- -- -- -- 1.676 m (5' 6\") 123.8 kg (273 lb)   24 92/68 -- -- 67 -- -- -- --   24 101/65 -- -- 65 -- -- -- --   24 191 98/72 -- -- 66 -- -- -- --   24 190 106/65 -- -- 68 -- 95 % -- --   24 1845 96/65 -- -- 67 -- 96 % -- --   24 1830 (!) 89/57 -- -- 73 -- 95 % -- --   24 1824 95/66 -- -- 74 -- 94 % -- --   24 1822 94/76 97.6  F (36.4  C) Oral 76 16 95 % -- --        Physical Exam  General: Woman lying in bed with eyes closed mumbling   HEENT: Atraumatic   EOM normal   External ears normal   Trachea midline  TMs normal  Neck: Supple, normal ROM  CV: Regular rate, regular rhythm   No murmur   No lower extremity edema  2+ radial and DP pulses  PULM: Breath sounds normal bilaterally  No wheezes or rales  ABD: Soft, non-tender, non-distended  Normal bowel sounds   No rebound or guarding   MSK: No gross deformities  NEURO: Oriented although dysarthric.  Will not participate with cranial nerve exam.  Pupils equal round and reactive.  No obvious unilateral weakness.   Skin: Warm, dry and intact      Emergency Department Course   ECG    ECG results from 24   EKG 12-lead, tracing only     Value    Systolic Blood Pressure     Diastolic Blood Pressure     Ventricular Rate 59    Atrial Rate 59    AK Interval 200    QRS Duration 86        QTc 457    P Axis 30    R AXIS -1    T Axis 6    Interpretation ECG      Sinus bradycardia with sinus arrhythmia with Fusion complexes  Low voltage QRS  Borderline ECG  When compared " with ECG of 12-APR-2022 13:13,  Fusion complexes are now Present  Criteria for Anterior infarct are no longer Present  Nonspecific T wave abnormality now evident in Inferior leads  QT has lengthened           Imaging:  Chest XR,  PA & LAT   Final Result   IMPRESSION: Heart size is normal. Lung volumes are mildly diminished with basilar atelectasis or scarring. No CHF lobar consolidation or effusion. Mediastinum and bony structures are unremarkable.      CTA Head Neck with Contrast   Final Result    IMPRESSION:    HEAD CTA:    1.  No intracranial arterial large vessel occlusion.      2.  Left cavernous ICA and left carotid siphon mild stenosis.      3.  Bilateral distal V4 segments demonstrate to moderate irregular stenoses.      4.  Left supraclinoid ICA demonstrates a small 2 mm inferior outpouching may reflect infundibulum with poorly visualized apical artery or small aneurysm.          NECK CTA:   1.  Right V1 segment gives the appearance of internal clot/thrombus, however there is overlying artifact and poor contrast opacification. Findings are favored to be artifactual. Postcontrast MRA neck with IV contrast would be of benefit if clinically    warranted.      2.  Otherwise, no significant stenosis or occlusion. No dissection.       DR AKIN VELASQUEZ was notified by Dr Babar Novoa at  8:25 PM 01/21/2024 CST/CDT.         CT Head w/o Contrast   Final Result   IMPRESSION:   1.  No acute intracranial hemorrhage.   2.  No CT evidence acute infarct. Aspect score 10.   3.  Chronic intracranial changes described above.      DR AKIN VELASQUEZ was notified by Dr Babar Novoa at  8:10 PM 01/21/2024 CST/CDT.      MR Brain w/o & w Contrast    (Results Pending)   MRA Neck (Carotids) wo & w Contrast    (Results Pending)          Laboratory:  Labs Ordered and Resulted from Time of ED Arrival to Time of ED Departure   GLUCOSE BY METER - Abnormal       Result Value    GLUCOSE BY METER POCT 133 (*)    BASIC METABOLIC PANEL -  Abnormal    Sodium 139      Potassium 4.1      Chloride 102      Carbon Dioxide (CO2) 27      Anion Gap 10      Urea Nitrogen 21.4 (*)     Creatinine 1.22 (*)     GFR Estimate 52 (*)     Calcium 9.8      Glucose 146 (*)    INR - Abnormal    INR 1.23 (*)    ROUTINE UA WITH MICROSCOPIC REFLEX TO CULTURE - Abnormal    Color Urine Yellow      Appearance Urine Clear      Glucose Urine Negative      Bilirubin Urine Negative      Ketones Urine Negative      Specific Gravity Urine 1.026      Blood Urine Negative      pH Urine 5.0      Protein Albumin Urine Negative      Urobilinogen Urine Normal      Nitrite Urine Positive (*)     Leukocyte Esterase Urine Moderate (*)     Mucus Urine Present (*)     RBC Urine 1      WBC Urine 17 (*)     Squamous Epithelials Urine <1      Hyaline Casts Urine 5 (*)    CBC WITH PLATELETS AND DIFFERENTIAL - Abnormal    WBC Count 4.5      RBC Count 3.43 (*)     Hemoglobin 10.1 (*)     Hematocrit 31.7 (*)     MCV 92      MCH 29.4      MCHC 31.9      RDW 15.2 (*)     Platelet Count 203      % Neutrophils 82      % Lymphocytes 10      % Monocytes 6      % Eosinophils 1      % Basophils 0      % Immature Granulocytes 1      NRBCs per 100 WBC 0      Absolute Neutrophils 3.7      Absolute Lymphocytes 0.4 (*)     Absolute Monocytes 0.3      Absolute Eosinophils 0.1      Absolute Basophils 0.0      Absolute Immature Granulocytes 0.0      Absolute NRBCs 0.0     INFLUENZA A/B, RSV, & SARS-COV2 PCR - Abnormal    Influenza A PCR Negative      Influenza B PCR Negative      RSV PCR Negative      SARS CoV2 PCR Positive (*)    PARTIAL THROMBOPLASTIN TIME - Normal    aPTT 27     TROPONIN T, HIGH SENSITIVITY - Normal    Troponin T, High Sensitivity 6     LACTIC ACID WHOLE BLOOD - Normal    Lactic Acid 1.1     HEPATIC FUNCTION PANEL - Normal    Protein Total 6.9      Albumin 3.8      Bilirubin Total <0.2      Alkaline Phosphatase 92      AST 23      ALT 19      Bilirubin Direct <0.20     LIPASE - Normal    Lipase  19     TSH WITH FREE T4 REFLEX - Normal    TSH 0.75     GLUCOSE MONITOR NURSING POCT   BLOOD CULTURE   BLOOD CULTURE   URINE CULTURE        Procedures   None    Emergency Department Course & Assessments:             Interventions:  Medications   cefTRIAXone (ROCEPHIN) 2 g vial to attach to  ml bag for ADULTS or NS 50 ml bag for PEDS (2 g Intravenous $New Bag 1/21/24 2229)   sodium chloride 0.9% BOLUS 1,000 mL (0 mLs Intravenous Stopped 1/21/24 2105)   Sodium Chloride for CT Scan Flush Use (80 mLs Intravenous $Given 1/21/24 1956)   iopamidol (ISOVUE-370) solution 500 mL (67 mLs Intravenous $Given 1/21/24 1955)   naloxone (NARCAN) injection 0.4 mg (0.4 mg Intravenous $Given 1/21/24 1911)   sodium chloride 0.9% BOLUS 1,000 mL (1,000 mLs Intravenous $New Bag 1/21/24 2106)        Assessments:  See ED course     Independent Interpretation (X-rays, CTs, rhythm strip):  None    Consultations/Discussion of Management or Tests:     ED Course as of 01/21/24 2235   Sun Jan 21, 2024 1916 Narcan given @ bedside. No clinical change   1917 Tier 1 stroke alerted    1928 Evaluation.  Blood pressure 80s over 40s.  Team getting access prior to imaging   2012 2012 deescalated stroke. Not TNK candidate d/t eliquis. Stroke will order MRI   2039 Reevaluation.  Updated family.  Patient condition unchanged   2139 Reevaluation.  Condition slightly improved but patient still too weak to check her weight.  Blood pressure 120 systolic.  Heart rate still 80.  discussed plan.   2215 Family updated   2234 Spoke to Dr. Carver who accepts under observation.       Social Determinants of Health affecting care:   None    Disposition:  The patient was admitted to the hospital under the care of Dr. Carver.     Impression & Plan    CMS Diagnoses: None      Medical Decision Making:  Vitals hypotensive 80s over 40s on arrival otherwise WNL.  This is a patient with CLL, chronic pain near cancer site (upper neck) here for sudden onset nausea vomiting  and dysarthria and unsteady gait.  Concerned could be a posterior stroke.   Last known normal 5 PM.  Tier 1 stroke alerted.  Initial imaging reassuring except for an abnormality in the right V1.  Possibly artifact although cannot definitively rule out blood clot.  She is anticoagulated with Eliquis so not a candidate for TNK.  In discussion with stroke neurology will order MRI brain and MRA neck to definitively evaluate cerebellum and posterior circulation respectively.    Her lactic is WNL, white count is normal.  Her EKG is nonischemic, troponin WNL.  Her blood counts appear stable.  Her x-ray is without acute findings.  Ultimately her testing returns positive for COVID.  Cardiac testing is reassuring.  Creatinine appears slightly elevated from baseline.  I still want her to undergo MRI as she is at high risk for clots.  She does have moderate leukocyte esterase and nitrates in her urine so we will treat with Rocephin given her profound encephalopathy.     Will admit to hospital medicine under observation.  MRIs pending on admission.    Diagnosis:    ICD-10-CM    1. Acute encephalopathy  G93.40       2. COVID-19  U07.1       3. Dizziness  R42       4. Bacteriuria  R82.71       5. Transient hypotension  I95.9                   Muna Rangel,   1/21/2024   Muna Nieves, Muna Landin,   01/21/24 2235

## 2024-01-22 NOTE — PROGRESS NOTES
"  [facility]    Stroke Telephone Note    I was called by Muna Rangel on 01/21/24 regarding patient Shana Ventura. The patient is a 58 year old female past medical history of B cell lymphoma, PE on eliquis (last dose this morning), smoking, DM, recent URI (seen in clinic on 01/16 on doxycycline)  Vitals  BP: 98/72   Pulse: 66   Resp: 16   Temp: 97.6  F (36.4  C)        Stroke Code Data (for stroke code without tele)  Stroke code activated 01/21/24 1916   Stroke provider first response 01/21/24 1916   Last known normal 01/21/24  1700      Time of discovery (or onset of symptoms)        Head CT read by Stroke Neuro Provider 01/21/24 2001   Was stroke code de-escalated? Yes  01/21/24 2014     Imaging Findings  CT head: no evidence of acute ischemic stroke or intracranial bleed  CTA head and neck: no evidence of large vessel occlusion of critical stenosis. R V1 thrombus vs artifact    Intravenous Thrombolysis  Not given due to:   - stroke mimic: peripheral vertigo with recent infection on doxycycline treatment  - DOAC dose within 48 hours or INR > 1.7    Endovascular Treatment  Not initiated due to absence of proximal vessel occlusion    Impression  New onset dizziness with gait difficulty and vomiting    Will get further workup to rule out stroke/V1 thrombus, Differentials include peripheral vertigo    Recommendations   -MRI brain w wo contrast  -MRA head and neck w wo contrast  -Further recommendations pending MRI    My recommendations are based on the information provided over the phone by Shana Ventura's in-person providers. They are not intended to replace the clinical judgment of her in-person providers. I was not requested to personally see or examine the patient at this time.     The Stroke Staff is Dr. Guy.    Jane Santo MD  Vascular Neurology Fellow    To page me or covering stroke neurology team member, click here: [facility]  Choose \"On Call\" tab at top, then select " "\"NEUROLOGY/ALL SITES\" from middle drop-down box, press Enter, then look for \"stroke\" or \"telestroke\" for your site.        "

## 2024-01-22 NOTE — ED NOTES
"[facility]  ED Nurse Handoff Report    ED Chief complaint: Nausea & Vomiting and Dizziness  . ED Diagnosis:   Final diagnoses:   Acute encephalopathy   COVID-19   Dizziness   Bacteriuria   Transient hypotension       Allergies:   Allergies   Allergen Reactions    Codeine Sulfate     Penicillins        Code Status: Full Code    Activity level - Baseline/Home:  independent.  Activity Level - Current:   assist of 1.   Lift room needed: No.   Bariatric: No   Needed: No   Isolation: Yes.   Infection: Not Applicable  COVID r/o and special precautions.     Respiratory status: Room air    Vital Signs (within 30 minutes):   Vitals:    01/21/24 1915 01/21/24 2015 01/21/24 2030 01/21/24 2121   BP: 98/72 101/65 92/68    Pulse: 66 65 67    Resp:       Temp:       TempSrc:       SpO2:       Weight:    123.8 kg (273 lb)   Height:    1.676 m (5' 6\")       Cardiac Rhythm:  ,      Pain level:    Patient confused: No.   Patient Falls Risk: nonskid shoes/slippers when out of bed, arm band in place, activity supervised, and toileting schedule implemented.   Elimination Status: Has voided     Patient Report - Initial Complaint: dizziness, nausea, vomiting.   Focused Assessment: Pt BIBA for vertigo, nausea, double ear infection.Pt was from a community event, pt become dizzy, lightheaded and lethargic. Pt vomited approx. 600 ml as per EMS. Zofran was given.  Upon arrival  pt is lethargic, still dizzy and nauseous.  Denies chest pain, SOB, abd pain, headache.  Pt on blood thinners        Abnormal Results:   Labs Ordered and Resulted from Time of ED Arrival to Time of ED Departure   GLUCOSE BY METER - Abnormal       Result Value    GLUCOSE BY METER POCT 133 (*)    BASIC METABOLIC PANEL - Abnormal    Sodium 139      Potassium 4.1      Chloride 102      Carbon Dioxide (CO2) 27      Anion Gap 10      Urea Nitrogen 21.4 (*)     Creatinine 1.22 (*)     GFR Estimate 52 (*)     Calcium 9.8      Glucose 146 (*)  "   INR - Abnormal    INR 1.23 (*)    ROUTINE UA WITH MICROSCOPIC REFLEX TO CULTURE - Abnormal    Color Urine Yellow      Appearance Urine Clear      Glucose Urine Negative      Bilirubin Urine Negative      Ketones Urine Negative      Specific Gravity Urine 1.026      Blood Urine Negative      pH Urine 5.0      Protein Albumin Urine Negative      Urobilinogen Urine Normal      Nitrite Urine Positive (*)     Leukocyte Esterase Urine Moderate (*)     Mucus Urine Present (*)     RBC Urine 1      WBC Urine 17 (*)     Squamous Epithelials Urine <1      Hyaline Casts Urine 5 (*)    CBC WITH PLATELETS AND DIFFERENTIAL - Abnormal    WBC Count 4.5      RBC Count 3.43 (*)     Hemoglobin 10.1 (*)     Hematocrit 31.7 (*)     MCV 92      MCH 29.4      MCHC 31.9      RDW 15.2 (*)     Platelet Count 203      % Neutrophils 82      % Lymphocytes 10      % Monocytes 6      % Eosinophils 1      % Basophils 0      % Immature Granulocytes 1      NRBCs per 100 WBC 0      Absolute Neutrophils 3.7      Absolute Lymphocytes 0.4 (*)     Absolute Monocytes 0.3      Absolute Eosinophils 0.1      Absolute Basophils 0.0      Absolute Immature Granulocytes 0.0      Absolute NRBCs 0.0     INFLUENZA A/B, RSV, & SARS-COV2 PCR - Abnormal    Influenza A PCR Negative      Influenza B PCR Negative      RSV PCR Negative      SARS CoV2 PCR Positive (*)    PARTIAL THROMBOPLASTIN TIME - Normal    aPTT 27     TROPONIN T, HIGH SENSITIVITY - Normal    Troponin T, High Sensitivity 6     LACTIC ACID WHOLE BLOOD - Normal    Lactic Acid 1.1     HEPATIC FUNCTION PANEL - Normal    Protein Total 6.9      Albumin 3.8      Bilirubin Total <0.2      Alkaline Phosphatase 92      AST 23      ALT 19      Bilirubin Direct <0.20     LIPASE - Normal    Lipase 19     TSH WITH FREE T4 REFLEX - Normal    TSH 0.75     GLUCOSE MONITOR NURSING POCT   BLOOD CULTURE   BLOOD CULTURE   URINE CULTURE        Chest XR,  PA & LAT   Final Result   IMPRESSION: Heart size is normal. Lung  volumes are mildly diminished with basilar atelectasis or scarring. No CHF lobar consolidation or effusion. Mediastinum and bony structures are unremarkable.      CTA Head Neck with Contrast   Final Result    IMPRESSION:    HEAD CTA:    1.  No intracranial arterial large vessel occlusion.      2.  Left cavernous ICA and left carotid siphon mild stenosis.      3.  Bilateral distal V4 segments demonstrate to moderate irregular stenoses.      4.  Left supraclinoid ICA demonstrates a small 2 mm inferior outpouching may reflect infundibulum with poorly visualized apical artery or small aneurysm.          NECK CTA:   1.  Right V1 segment gives the appearance of internal clot/thrombus, however there is overlying artifact and poor contrast opacification. Findings are favored to be artifactual. Postcontrast MRA neck with IV contrast would be of benefit if clinically    warranted.      2.  Otherwise, no significant stenosis or occlusion. No dissection.       DR AKIN VELASQUEZ was notified by Dr Babar Novoa at  8:25 PM 01/21/2024 CST/CDT.         CT Head w/o Contrast   Final Result   IMPRESSION:   1.  No acute intracranial hemorrhage.   2.  No CT evidence acute infarct. Aspect score 10.   3.  Chronic intracranial changes described above.      DR AKIN VELASQUEZ was notified by Dr Babar Novoa at  8:10 PM 01/21/2024 CST/CDT.      MR Brain w/o & w Contrast    (Results Pending)   MRA Neck (Carotids) wo & w Contrast    (Results Pending)       Treatments provided: see MAR  Family Comments: N/A  OBS brochure/video discussed/provided to patient:  N/A  ED Medications:   Medications   cefTRIAXone (ROCEPHIN) 2 g vial to attach to  ml bag for ADULTS or NS 50 ml bag for PEDS (2 g Intravenous $New Bag 1/21/24 2229)   sodium chloride 0.9% BOLUS 1,000 mL (0 mLs Intravenous Stopped 1/21/24 2105)   Sodium Chloride for CT Scan Flush Use (80 mLs Intravenous $Given 1/21/24 1956)   iopamidol (ISOVUE-370) solution 500 mL (67 mLs Intravenous  $Given 1/21/24 1955)   naloxone (NARCAN) injection 0.4 mg (0.4 mg Intravenous $Given 1/21/24 1911)   sodium chloride 0.9% BOLUS 1,000 mL (1,000 mLs Intravenous $New Bag 1/21/24 2106)       Drips infusing:  No  For the majority of the shift this patient was Green.   Interventions performed were N/A.    Sepsis treatment initiated: No    Cares/treatment/interventions/medications to be completed following ED care: see MD order and notes    ED Nurse Name: Lee Ann Santos RN  10:45 PM     RECEIVING UNIT ED HANDOFF REVIEW    Above ED Nurse Handoff Report was reviewed: Yes  Reviewed by: Liza Brooks RN on January 22, 2024 at 12:09 AM

## 2024-01-22 NOTE — PLAN OF CARE
"PRIMARY DIAGNOSIS: \"GENERIC\" NURSING  OUTPATIENT/OBSERVATION GOALS TO BE MET BEFORE DISCHARGE:  ADLs back to baseline: No    Activity and level of assistance: have not been out of bed yet    Pain status: Pain free.    Return to near baseline physical activity: No     Discharge Planner Nurse   Safe discharge environment identified: Yes  Barriers to discharge: Yes       Entered by: Liza Brooks RN 01/22/2024 3:18 AM     Please review provider order for any additional goals.   Nurse to notify provider when observation goals have been met and patient is ready for discharge.Goal Outcome Evaluation:                        "

## 2024-01-22 NOTE — PROGRESS NOTES
"PRIMARY DIAGNOSIS: COVID-19, BACTERIURIA  OUTPATIENT/OBSERVATION GOALS TO BE MET BEFORE DISCHARGE:  ADLs back to baseline: No    Activity and level of assistance: Up with standby assistance.    Pain status: Pain free.    Return to near baseline physical activity: No     Discharge Planner Nurse   Safe discharge environment identified: Yes  Barriers to discharge: Yes       Entered by: Sarah Strickland RN 01/22/2024 9:23 AM   /89 (BP Location: Left arm)   Pulse 72   Temp 97.8  F (36.6  C) (Oral)   Resp 16   Ht 1.702 m (5' 7\")   Wt 122.5 kg (270 lb 1 oz)   SpO2 100%   BMI 42.30 kg/m     Pt is alert and oriented x 4, denies pain, neuro assessment completed, neuros intact, blood sugar low at 63mg/dl, 8 oz orange juice given with 2 jose crackers, recheck improved to 112. Resting comfortably in bed, chatting to family on phone. Continue plan of care.  Please review provider order for any additional goals.   Nurse to notify provider when observation goals have been met and patient is ready for discharge.  "

## 2024-01-22 NOTE — PHARMACY-ADMISSION MEDICATION HISTORY
Pharmacist Admission Medication History    Admission medication history is complete. The information provided in this note is only as accurate as the sources available at the time of the update.    Information Source(s): Family member and CareEverywhere/[employer] via in-person    Pertinent Information: None    Changes made to PTA medication list:  Added: Apixaban, Doxycycline, Entecavir, Fentanyl, Fluticasone, Hydroxyzine, Oxycodone, Pregabalin, Semaglutide, Trazodone  Deleted: Topiramate  Changed: Hydrochlorothiazide 25mg daily --> 12.5mg daily      Allergies reviewed with patient and updates made in EHR: no    Medication History Completed By: Riley Eldridge [facility] 1/21/2024 10:40 PM    Prior to Admission medications    Medication Sig Last Dose Taking? Auth Provider Long Term End Date   ACCU-CHEK GUIDE test strip   Yes Reported, Patient     acetaminophen (TYLENOL) 500 MG tablet Take 1,000 mg by mouth every 6 hours as needed for pain Unknown Yes Reported, Patient     apixaban ANTICOAGULANT (ELIQUIS) 5 MG tablet Take 5 mg by mouth 2 times daily 1/21/2024 at x1 Yes Unknown, Entered By History     blood glucose monitoring (ACCU-CHEK FASTCLIX) lancets 2 times daily  Yes Reported, Patient     cyclobenzaprine (FLEXERIL) 10 MG tablet Take 10 mg by mouth 3 times daily as needed for muscle spasms 1/21/2024 at x1 Yes Reported, Patient     docusate sodium (COLACE) 100 MG capsule Take 100 mg by mouth 2 times daily 1/21/2024 at x1 Yes Unknown, Entered By History     doxycycline hyclate (VIBRAMYCIN) 100 MG capsule Take 100 mg by mouth 2 times daily 1/21/2024 at x1 Yes Unknown, Entered By History     entecavir (BARACLUDE) 0.5 MG tablet Take 0.5 mg by mouth daily 1/21/2024 at AM Yes Unknown, Entered By History Yes    fentaNYL (DURAGESIC) 25 mcg/hr 72 hr patch Place 1 patch onto the skin every 72 hours remove old patch. 1/20/2024 at Removed by EMS today Yes Unknown, Entered By History     fluticasone (FLONASE) 50 MCG/ACT nasal  spray Spray 2 sprays into both nostrils daily 1/21/2024 Yes Unknown, Entered By History     hydrochlorothiazide (HYDRODIURIL) 12.5 MG tablet Take 12.5 mg by mouth daily 1/21/2024 Yes Reported, Patient Yes    hydrOXYzine HCl (ATARAX) 25 MG tablet Take 1-2 tablets by mouth every 6 hours as needed for anxiety Unknown Yes Unknown, Entered By History     insulin glargine (LANTUS PEN) 100 UNIT/ML pen Inject 10 Units Subcutaneous at bedtime 1/20/2024 at HS Yes Unknown, Entered By History Yes    loratadine (CLARITIN) 10 MG tablet Take 10 mg by mouth daily 1/21/2024 Yes Unknown, Entered By History     melatonin 3 MG tablet Take 3 mg by mouth at bedtime  Yes Unknown, Entered By History     naproxen (NAPROSYN) 500 MG tablet Take 500 mg by mouth 2 times daily as needed for moderate pain Unknown Yes Reported, Patient     oxyCODONE (ROXICODONE) 5 MG tablet Take 5-10 mg by mouth every 4 hours as needed for severe pain 1/21/2024 at 1400 Yes Unknown, Entered By History     Pregabalin (LYRICA) 200 MG capsule Take 200 mg by mouth 2 times daily 1/21/2024 at x1 Yes Unknown, Entered By History Yes    Prenatal 27-1 MG TABS Take 1 tablet by mouth daily  Yes Reported, Patient     Semaglutide, 2 MG/DOSE, (OZEMPIC, 2 MG/DOSE,) 8 MG/3ML pen Inject 2 mg Subcutaneous every 7 days 1/14/2024 Yes Unknown, Entered By History     sertraline (ZOLOFT) 100 MG tablet Take 200 mg by mouth daily 1/21/2024 Yes Unknown, Entered By History Yes    traZODone (DESYREL) 50 MG tablet Take 50 mg by mouth nightly as needed for sleep  Yes Unknown, Entered By History Yes    vitamin D2 (ERGOCALCIFEROL) 51346 units (1250 mcg) capsule Take 50,000 Units by mouth once a week 1/21/2024 Yes Reported, Patient

## 2024-01-22 NOTE — PROGRESS NOTES
[facility]    Medicine Progress Note - Hospitalist Service    Date of Admission:  1/21/2024    Assessment & Plan   Shana Ventura is a 58 year old female admitted on 1/21/2024. She has a history of B-cell lymphoma in remission, hypertension, DM2, chronic anemia, recurrent PE on DOAC who presents to the ED for evaluation of altered mental status, weakness, nausea/vomiting.  Reportedly woke up in her normal state the morning of 1/21, took pain medications prior to attending a baby shower, afterwards was extremely weak and altered.  Very different from baseline per her family.      Initial evaluation in the ED pt hypotensive initially in the 80s/40s and remained soft, HR 60s-70s, SpO2 mid 90s on room air. Evaluated for stroke given presenting symptoms, CT imaging was essentially negative apart from one vascular defect that was presumed to be artifact, although neurology consulted and recommended additional MRI for further evaluation. Remainder of workup revealed UA positive for nitrites, leukocyte esterase, WBC, and hyaline casts. Positive COVID-19 testing. BMP significant for elevated urea nitrogen-21.4, creatinine-1.22, CFR-52, and glucose- 146. CBS shows reduced RBC-3.43, hemoglobin 10.1, hematocrit 31.7 and absolute lymphocytes .4. She received narcan, ceftriaxone and 2 L NS. EKG without ischemic changes. Negative troponin.      Acute/subacute left superior cerebellar peduncle infarct   Nausea, vomiting  Initial concern for encephalopathy, resolved, likely stroke related   Patient reportedly took all of her usual medications, went to the baby shower, and afterwards was extremely off from her baseline.  Was reportedly somnolent, slurring words, nauseous, vomiting, and very weak. Possibly an element of vertigo based on her description. Due to the symptoms on presentation patient was initially evaluated for stroke. Head CT did not show acute infarct with abnormality described as likely artifact.  Follow-up MRI was ordered. Significant findings include acute/subacute infarct in the left superior cerebellar peduncle without hemorrhagic conversion.   -Stroke neurology recommendations: high intensity statin therapy (pt would like time to think about it), continue PTA Apixaban, goal HgA1c <7.0 and BP <130/80 (allow for permissive HTN on 1/22)   -monitor on telemetry   -Every 4 hours neuro checks  -(1/22/2024) pt A&Ox4. No focal deficits on neuro exam   -ECHO performed on 1/22/24 did not find thrombus in L ventricle, bubble study negative, EF 55-66%  -stroke neurology recommending CT of chest/abdomen/pelvis to evaluate for malignancy with recent history of lymphoma (refer to below)  -no current therapy needs since at baseline mobility   -Pt will likely discharge in the AM once final recommendations from stroke neurology     Possible urinary tract infection  Bacteriuria  Acute kidney injury-resolved  Creatinine on arrival was 1.22, baseline appears closer to 0.9.  Urinalysis showed WBCs as well as positive leukocyte esterase and nitrates.  Patient was not endorsing any classic UTI symptoms, but with altered mental status received dose of ceftriaxone in the ED.  Will hold off on ordering additional antibiotics, on reassessment on 1/22 patient denies urinary symptoms and currently afebrile with no leukocytosis.  - follow urine culture, pending results consider treatment   - suspect VERNON related to dehydration from vomiting prior to admission, encourage oral intake    - 1/22/24 AM CBC unremarkable. Urea nitrogen, creatinine and GFR within normal limits.    Covid-19 Infection  Patient did test positive for COVID-19, although initial altered mental status would be a slightly unusual presentation for COVID infection. No cough, sob, wheezing, crackles noted on exam. No evidence of covid pneumonia on XR.  -monitor for hypoxia  -supportive cares if needed     Essential hypertension  On HCTZ prior to admission. Some relative  hypotension, low normal blood pressures so far during admission so holding this for now. BPs on 1/22/2024 in 130s/80s-90s, will monitor and resume HCTZ in the AM if continues to rise.     Type 2 diabetes mellitus   HgA1c- 7.5 (1/21/2024)  Hypoglycemia episode (1/22)  On Lantus 10 units daily, semaglutide PTA.    -episode of hypoglycemia 1/22/2024 AM with value of 64. Was given orange juice with improvement.   -Continue Lantus at decreased dose of 6 units this evening and hold if glucose falls below 120     Chronic normocytic anemia  Hemoglobin 10.1 on admission. Seems consistent with baseline which appears to be between 10/11.  Will recheck with daily CBC.  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  Complex pain situation with combination of contributing issues including C-spine pain from excisional surgery during cancer treatment and subsequent C7-T12 laminectomy, multiple areas of degenerative joint disease including right knee, left wrist, left sternoclavicular joint.   -Pain management less likely to have been contributing to pts AMS on presentation given new diagnosis of cerebellar infarct and well no change in mentation with narcan initially   -continue PTA fentanyl patch and oxy  -no current need for pain management consult here, continue outpatient follow up through [place]     Recurrent PE in setting of cancer  Multiple PE in the setting of active CLL.  Anticoagulated with apixaban. Evening dose held on 1/21/2024 in anticipation of follow-up MRI.  -resume Apixaban on 1/22      Possible hepatitis B infection  Per chart review and medication reconciliation patient is taking Entecavir.  Reviewed patient's medical record and found a single hepatitis B antibody  "screening positive test from [facility] in April of last year, however unable to find any additional information regarding HBV infection.  Pharmacy did confirm patient is currently taking Entecavir.  - Follow-up HBV testing shows negative B surface antigen, negative B surface antibody, negative IgM, and positive B core antibody  - Continue Entecavir, need to discuss further details with patient about hepatitis infection but appears this was tested in 02/2023 by her oncology team (did not question her about this on 1/22)     RUQ pain  Constipation  Pt describes having chronic abdominal pain associated with chronic constipation due to chronic opioid use. Pt reports RUQ pain after breakfast today. Will continue to monitor for now and seek further evaluation if this pain worsens.  -resume PTA Colace for constipation     Recent pansinusitis: diagnosed at Urgent Care on 1/16 when presenting with cough with sinus congestion as well drainge and currently on Doxycycline for treatment.  -continue PTA Doxycycline      Observation Goals: -diagnostic tests and consults completed and resulted, -vital signs normal or at patient baseline, -adequate pain control on oral analgesics, -returns to baseline functional status, Nurse to notify provider when observation goals have been met and patient is ready for discharge.  Diet: Regular Diet Adult    DVT Prophylaxis: DOAC  Bee Catheter: Not present  Lines: None     Cardiac Monitoring: ACTIVE order. Indication: Stroke, acute (48 hours)  Code Status: Full Code      Clinically Significant Risk Factors Present on Admission               # Drug Induced Coagulation Defect: home medication list includes an anticoagulant medication    # Hypertension: Noted on problem list     # DMII: A1C = 7.5 % (Ref range: <5.7 %) within past 6 months    # Severe Obesity: Estimated body mass index is 42.3 kg/m  as calculated from the following:    Height as of this encounter: 1.702 m (5' 7\").    Weight as of " "this encounter: 122.5 kg (270 lb 1 oz).              Disposition Plan      Expected Discharge Date: 01/22/2024                  The patient's care was discussed with the Bedside Nurse, Patient, and Patient's Family.    I saw the patient in conjunction with PA student, Una Soria, and saw patient separately performing my own history and physical exam from the student with agreement in the above plan.    Connie HOUSERBETO  [facility] Medicine   ______________________________________________________________________    Interval History   Pt is resting comfortably in bed talking on the phone with her daughter. Pt A&Ox4. Feels much better this morning. Reports RUQ abdominal pain after eating breakfast today, which is apparently normal for her. Has hx of chronic opioid use induced constipation that contributes to this pain. Had BM this AM. Pt has chronic neck pain and requests PTA fentanyl patch.    Patient confirms prior to admission history of feeling fine until around 16:00 on 1/21, wave of tired, lightheaded, dizzy, very nauseated and eventually vomited for paramedics, felt unsteady standing up when trying to take photos (need assistance from daughter, slurring words, and had the \"shakes\").  Denies associated headache, unilateral weakness, or facial droop. No prior episodes, felt symptoms started improving after antibiotics around 22:30, felt more present/alert. Denies cough, congestion, but notes recent sinus/ear infection for which she is on a 10 day course of Doxycycline. She notes decreased urine output and smelled different, denies frequency, urgency, dysuria, or hematuria.     Physical Exam   Vital Signs: Temp: 97.8  F (36.6  C) Temp src: Oral BP: 138/89 Pulse: 72   Resp: 16 SpO2: 100 % O2 Device: None (Room air)    Weight: 270 lbs 1.02 oz    General: Comfortably sitting up in bed talking on the phone, interactive, pleasant, NAD  CV: RRR. No murmurs or rubs  Pulm: Non labored breathing. Lungs clear to " ascultation bilaterally.  GI: Tenderness to the RUQ upon palpation. All other quadrants non tender. No masses. Normoactive bowel sounds   Neuro: A&Ox4. CN II-XII intact. Equal 5/5 strength to all extremities. Normal heal to shin and finger to nose. Sensation grossly intact.   MSK: Spontaneously moves all extremities.    Medical Decision Making       50 MINUTES SPENT BY ME on the date of service doing chart review, history, exam, documentation & further activities per the note.      Data     I have personally reviewed the following data over the past 24 hrs:    4.1  \   9.8 (L)   / 223     143 107 15.3 /  147 (H)   3.9 24 0.90 \     ALT: 17 AST: 24 AP: 91 TBILI: <0.2   ALB: 3.9 TOT PROTEIN: 6.7 LIPASE: 19     Trop: 7 BNP: N/A     TSH: 0.75 T4: N/A A1C: 7.5 (H)     Procal: N/A CRP: N/A Lactic Acid: 1.1       INR:  1.23 (H) PTT:  27   D-dimer:  N/A Fibrinogen:  N/A       Imaging results reviewed over the past 24 hrs:   Recent Results (from the past 24 hour(s))   CT Head w/o Contrast    Narrative    EXAM: CT HEAD W/O CONTRAST  LOCATION: [facility]  DATE: 1/21/2024    INDICATION: Code Stroke to evaluate for potential thrombolysis and thrombectomy. Code stroke. Nausea, vomiting, dizziness.  COMPARISON: None.  TECHNIQUE: Routine CT Head without IV contrast. Multiplanar reformats. Dose reduction techniques were used.    FINDINGS:  INTRACRANIAL CONTENTS: No intracranial hemorrhage, extraaxial collection, or mass effect.  No CT evidence of acute infarct. Mild to moderate presumed chronic small vessel ischemic changes. Normal ventricles and sulci. Right inferior frontal lobe small   area of tissue loss and gliosis.    VISUALIZED ORBITS/SINUSES/MASTOIDS: No intraorbital abnormality. Mild to moderate mucosal thickening scattered about the paranasal sinuses. Mastoid air cells patchy opacification.    BONES/SOFT TISSUES: No acute abnormality.      Impression    IMPRESSION:  1.  No acute intracranial  hemorrhage.  2.  No CT evidence acute infarct. Aspect score 10.  3.  Chronic intracranial changes described above.    DR AKIN VELASQUEZ was notified by Dr Babar Novoa at  8:10 PM 01/21/2024 CST/CDT.   CTA Head Neck with Contrast    Narrative    EXAM: CTA HEAD NECK W CONTRAST  LOCATION: [facility]  DATE: 1/21/2024    INDICATION: Code Stroke to evaluate for potential thrombolysis and thrombectomy. Code stroke. Nausea, vomiting, dizziness.  COMPARISON: None.  CONTRAST: 67mL Isovue 370  TECHNIQUE: Head and neck CT angiogram with IV contrast. Axial helical CT images of the head and neck vessels obtained during the arterial phase of intravenous contrast administration. Axial 2D reconstructed images and multiplanar 3D MIP reconstructed   images of the head and neck vessels were performed by the technologist. Dose reduction techniques were used. All stenosis measurements made according to NASCET criteria unless otherwise specified.    FINDINGS:   HEAD CTA:  Left cavernous ICA and left carotid siphon mild stenosis.    Bilateral distal V4 segments demonstrate to moderate irregular stenoses.    No additional significant stenosis or occlusion.      Left supraclinoid ICA demonstrates a small 2 mm inferior outpouching may reflect infundibulum with poorly visualized apical artery or small aneurysm.     Otherwise, no brain aneurysm. No AVM/AVF.      NECK CTA:  Bilateral extracranial carotid arteries demonstrate a retropharyngeal medially deviated course impressing upon the aerodigestive airway.    RIGHT CAROTID:   No significant stenosis/occlusion. No dissection.    LEFT CAROTID:   No significant stenosis/occlusion. No dissection.    VERTEBRAL ARTERIES:  Right V1 segment gives the appearance of internal clot/thrombus, however there is overlying artifact and poor contrast opacification. Findings are favored to be artifactual.      Otherwise, no significant stenosis or occlusion. No dissection.      AORTIC ARCH:  Classic aortic arch anatomy. No significant stenosis at the origin of the great vessels.    ARTERIAL PLAQUE: Scattered calcified/noncalcified plaque.    NONVASCULAR STRUCTURES:   No significant findings.        Impression     IMPRESSION:   HEAD CTA:   1.  No intracranial arterial large vessel occlusion.    2.  Left cavernous ICA and left carotid siphon mild stenosis.    3.  Bilateral distal V4 segments demonstrate to moderate irregular stenoses.    4.  Left supraclinoid ICA demonstrates a small 2 mm inferior outpouching may reflect infundibulum with poorly visualized apical artery or small aneurysm.       NECK CTA:  1.  Right V1 segment gives the appearance of internal clot/thrombus, however there is overlying artifact and poor contrast opacification. Findings are favored to be artifactual. Postcontrast MRA neck with IV contrast would be of benefit if clinically   warranted.    2.  Otherwise, no significant stenosis or occlusion. No dissection.     DR AKIN VELASQUEZ was notified by Dr Babar Novoa at  8:25 PM 01/21/2024 CST/CDT.     Chest XR,  PA & LAT    Narrative    EXAM: XR CHEST 2 VIEWS  LOCATION: [facility]  DATE: 1/21/2024    INDICATION: hypotension  COMPARISON: 04/10/2022      Impression    IMPRESSION: Heart size is normal. Lung volumes are mildly diminished with basilar atelectasis or scarring. No CHF lobar consolidation or effusion. Mediastinum and bony structures are unremarkable.   MRA Neck (Carotids) wo & w Contrast    Addendum: 1/22/2024    The V1 segment of the right vertebral artery is obscured due to patient motion on the time-of-flight and postcontrast sequences. On the postcontrast sequences there is loss of flow-related signal involving the right V1 segment of the vertebral artery   which could be related to artifact or a filling defect/thrombus. Consider repeat CTA neck as clinically indicated. Beyond this irregularity, the right vertebral artery is patent without significant  stenosis or dissection.            Narrative    EXAM: MRA NECK (CAROTIDS) W/O and W CONTRAST  LOCATION: [facility]  DATE: 1/22/2024    INDICATION: Dizziness. Dysarthria. Altered mental status.  COMPARISON: None.  CONTRAST: 10 mlGadavist   TECHNIQUE: Neck MRA without and with IV contrast. Stenosis measurements made according to NASCET criteria unless otherwise specified.    FINDINGS:  RIGHT CAROTID: No significant stenosis or dissection.    LEFT CAROTID: No significant stenosis or dissection.    VERTEBRAL ARTERIES: No focal stenosis or dissection. Dominant left and smaller right vertebral arteries.    AORTIC ARCH: Classic aortic arch anatomy with no significant stenosis at the origin of the great vessels.      Impression    IMPRESSION:  1.  No significant stenosis or dissection.   MR Brain w/o & w Contrast    Narrative    EXAM: MR BRAIN W/O and W CONTRAST  LOCATION: [facility]  DATE: 1/22/2024    INDICATION: New onset vertigo, vomiting, gait difficulty.  COMPARISON: CTA head neck dated 01/21/2024.  CONTRAST: 10 ml Gadavist.   TECHNIQUE: Routine multiplanar multisequence head MRI without and with intravenous contrast.    FINDINGS:  INTRACRANIAL CONTENTS: There are small punctate focus of diffusion restriction within the left superior cerebellar peduncle, just above the brachium pontis (image 49 series 3 and image 13 series 4). Additionally there is subtle associated T2/FLAIR signal   hyperintensity in these regions (image 11 series 5 and image 11 series 6). No mass, acute hemorrhage, or extra-axial fluid collections. Patchy nonspecific T2/FLAIR hyperintensities within the cerebral white matter most consistent with mild to moderate   chronic microvascular ischemic change. Normal ventricles and sulci. Normal position of the cerebellar tonsils. No pathologic contrast enhancement.    SELLA: No abnormality accounting for technique.    OSSEOUS STRUCTURES/SOFT TISSUES: Normal  marrow signal. The major intracranial vascular flow voids are maintained.     ORBITS: No abnormality accounting for technique.     SINUSES/MASTOIDS: Mild to moderate mucosal thickening scattered about the paranasal sinuses. No middle ear or mastoid effusion.       Impression    IMPRESSION:  1.  Punctate acute/subacute infarct in the left superior cerebellar peduncle. No associated hemorrhagic conversion.  2.  Mild to moderate presumed chronic microvascular ischemic changes, as above.  3.  Mild to moderate scattered paranasal sinus mucosal thickening.                  Echocardiogram Complete   Result Value    LVEF  55-60%    [facility]    [id number]  [id number]  [id number]  [id number]^LEAH^SHAWN^LUPE     [facility]  Echocardiography Laboratory  [address] [place] [address]     Name: MOLLY WALTER  MRN: [id number]  : 1965  Study Date: 2024 10:21 AM  Age: 58 yrs  Gender: Female  Patient Location: [facility]  Reason For Study: CVA  Ordering Physician: SHAWN LYNN     BSA: 2.3 m2  Height: 67 in  Weight: 270 lb  HR: 70  BP: 112/77 mmHg  ______________________________________________________________________________  Procedure  Complete Portable Bubble Echo Adult. [employer] (NDC #[id number]) given  intravenously.  ______________________________________________________________________________  Interpretation Summary     Limited quality echo due to patient body habitus. This will adversely affect  interpretation  There is no thrombus seen in the left ventricle.  RV not well seen, grossly normal  A contrast injection (Bubble Study) was performed that was negative for flow  across the interatrial septum.  No obvious cardiac source of emboli was seen within the limits of a  transthoracic echocardiogram.  ______________________________________________________________________________  Left Ventricle  The left ventricle is normal in size. There is normal left ventricular wall  thickness. The  visual ejection fraction is 55-60%. Left ventricular diastolic  function is normal. Normal left ventricular wall motion. There is no thrombus  seen in the left ventricle.     Right Ventricle  RV not well seen, grossly normal.     Atria  Normal left atrial size. Right atrial size is normal. A contrast injection  (Bubble Study) was performed that was negative for flow across the interatrial  septum.     Mitral Valve  The mitral valve leaflets appear normal. There is no evidence of stenosis,  fluttering, or prolapse.     Tricuspid Valve  Normal tricuspid valve. There is trace tricuspid regurgitation. Doppler  findings do not suggest pulmonary hypertension.     Aortic Valve  Normal tricuspid aortic valve.     Pulmonic Valve  The pulmonic valve is not well seen, but is grossly normal.     Vessels  The aortic root is normal size.     Pericardium  The pericardium appears normal.     Rhythm  Sinus rhythm was noted.  ______________________________________________________________________________  MMode/2D Measurements & Calculations  IVSd: 0.96 cm  LVIDd: 4.2 cm  LVIDs: 2.7 cm  LVPWd: 1.1 cm  IVC diam: 1.9 cm  FS: 34.2 %  LV mass(C)d: 140.6 grams  LV mass(C)dI: 61.2 grams/m2  Ao root diam: 3.1 cm  asc Aorta Diam: 3.3 cm  Ao root diam index Ht(cm/m): 1.8  Ao root diam index BSA (cm/m2): 1.4  Asc Ao diam index BSA (cm/m2): 1.4  Asc Ao diam index Ht(cm/m): 1.9  LA Volume (BP): 51.9 ml     LA Volume Index (BP): 22.6 ml/m2  RV Base: 3.1 cm  RWT: 0.53  TAPSE: 1.5 cm     Doppler Measurements & Calculations  MV E max phi: 61.3 cm/sec  MV A max phi: 63.9 cm/sec  MV E/A: 0.96  MV max P.9 mmHg  MV mean P.95 mmHg  MV V2 VTI: 24.2 cm  MV dec time: 0.21 sec  TR max phi: 158.0 cm/sec  TR max PG: 10.0 mmHg  E/E' av.7  Lateral E/e': 4.6  Medial E/e': 6.7  RV S Phi: 12.6 cm/sec     ______________________________________________________________________________  Report approved by: Jeannine Tejeda 2024 01:12 PM

## 2024-01-22 NOTE — CONSULTS
Care Management Initial Consult    General Information  Assessment completed with: Patient,    Type of CM/SW Visit: Initial Assessment    Primary Care Provider verified and updated as needed: Yes   Readmission within the last 30 days: no previous admission in last 30 days      Reason for Consult: discharge planning       Communication Assessment  Patient's communication style: spoken language (English or Bilingual)    Hearing Difficulty or Deaf: no   Wear Glasses or Blind: yes    Cognitive  Cognitive/Neuro/Behavioral: WDL  Level of Consciousness: alert  Arousal Level: opens eyes spontaneously  Orientation: oriented x 4     Best Language: 0 - No aphasia  Speech: clear, spontaneous    Living Environment:   People in home: parent(s)     Current living Arrangements: apartment      Able to return to prior arrangements: yes       Family/Social Support:  Care provided by: self  Provides care for: no one     Parent(s), Children          Description of Support System: Supportive, Involved       Current Resources:   Patient receiving home care services: No     Community Resources: Other (see comment) ([facility])  Equipment currently used at home: none    Does the patient's insurance plan have a 3 day qualifying hospital stay waiver?  No    Lifestyle & Psychosocial Needs:  Social Determinants of Health     Food Insecurity: Not on file   Depression: Not at risk (1/17/2023)    PHQ-2     PHQ-2 Score: 0   Housing Stability: Not on file   Tobacco Use: Low Risk  (1/21/2024)    Patient History     Smoking Tobacco Use: Never     Smokeless Tobacco Use: Never     Passive Exposure: Not on file   Financial Resource Strain: Not on file   Alcohol Use: Not on file   Transportation Needs: Not on file   Physical Activity: Not on file   Interpersonal Safety: Not on file   Stress: Not on file   Social Connections: Not on file       Functional Status:  Prior to admission patient needed assistance:   Dependent ADLs:: Independent  Dependent  IADLs:: Independent  Assesssment of Functional Status: At functional baseline          Additional Information:  CM consulted for discharge planning. Pt admitted with COVID 19 infection, nausea, vomiting, weakness, metabolic encephalopathy, R/O stroke. Spoke with pt via phone due to special precautions.  Pt lives in an apartment with her mother. Pt reports she is independent with ADLs and IADLs. PT was cancelled as was reported that pt is doing well and had no PT needs. Pt agrees she is doing well.  Pt would like to establish care with a new PCP closer to her home. Agreed to request an appointment at the [facility], request made via the [facility]. Pt denies any discharge needs at this time other than establishing with new PCP.   Pt states family is likely to provide transportation home.  Will continue to follow for discharge needs.    Susana Killian RN   Inpatient Care Coordination  [facility]   Phone: [phone]

## 2024-01-22 NOTE — CONSULTS
"[facility]    Stroke Consult Note    Reason for Consult:  stroke    Chief Complaint: Nausea & Vomiting and Dizziness       HPI  Shana Ventura is a 58 year old female with past medical history significant for B cell lymphoma (treatment completed 9/2023, in remission) s/p excision of spinal lymphoma mass, PE on Eliquis, tobacco use, DM, HTN, chronic anemia and chronic pain. She was brought to the ED 1/21/24 for evaluation of altered mental status and dizziness. She reports that she felt at baseline on waking yesterday. She went to a baby shower in the afternoon and felt well until she had suddenly \"felt high\". She was nauseated and \"woozy\". The nausea progressed and EMS were called. She felt her mental processing was slow, became dizzy, and vomited several times.  She has significant neck and upper back pain since her spinal mass resection, and held Eliquis the 13th-16th in anticipation of a spinal nerve block.    MRI brain showed a punctate acute infarct in the left superior cerebellar peduncle. She was also found to have a possible UTI and is COVID-19 positive. She was hypotensive in the 90s/70s on presentation.  Her home blood pressure readings have been <135/<87 for the past several weeks, however, for the past few days, SBP has been in the 90s.     LUE tremor. Feels back to normal, felt better last night.     Stroke Evaluation Summarized    MRI/Head CT Punctate acute/subacute infarct in the left superior cerebellar peduncle    Intracranial Vasculature CTA: Left cavernous ICA and left carotid siphon mild stenosis, bilateral distal V4 moderate stenosis, left supraclinoid ICA 2 mm infundibulum versus aneurysm   Cervical Vasculature MRA: Motion degradation of the right V1 segment, on postcontrast images there is loss of flow related signal in the right V1  CTA: Right V1 segment with appearance of clot/thrombus, however favored to be artifactual     Echocardiogram EF 55-60%, normal left " "atrium, negative bubble   EKG/Telemetry Sinus bradycardia   Other Testing CT CAP: PENDING     LDL  1/22/2024: 84 mg/dL   A1C  1/21/2024: 7.5 %   Troponin 1/22/2024: 7 ng/L       Impression  Acute ischemic stroke of left superior cerebellar peduncle due to large-artery atherosclerosis versus less likely hypercoagulable state.  She has moderate bilateral V4 stenosis and possible R vertebral artery occlusion.     Recommendations   Secondary Stroke Prevention  - Continue Eliquis 5 mg BID.   - LDL 84. Goal LDL 40-70. Recommend high intensity statin, she would like to think about it overnight.   - A1c 7.5 (improved from 9.3 in September), goal <7.0  - Permissive HTN today, then goal BP <130/80 with tighter control associated with decreased overall CV risk, if tolerated. Discussed the importance of home BP monitoring and keeping a log for PCP.  - PT/OT/SLP  - Stroke education. Discussed stroke warning signs (BE FAST) and need for emergent presentation if symptoms occur  - Mediterranean diet  - Tobacco screen: prior smoker   - EDIN screen: negative screen.     Diagnostic Evaluation  - CT CAP to evaluate for malignancy  - Telemetry while inpatient    Patient Follow-up    - final recommendation pending work-up    Thank you for this consult.  We will continue to follow for results of CT And update recommendations as indicated.    Lovely Ojeda, CNP  Vascular Neurology    To page me or covering stroke neurology team member, click here: [facility]  Choose \"On Call\" tab at top, then select \"NEUROLOGY/ALL SITES\" from middle drop-down box, press Enter, then look for \"stroke\" or \"telestroke\" for your site.  _____________________________________________________    Clinically Significant Risk Factors Present on Admission               # Drug Induced Coagulation Defect: home medication list includes an anticoagulant medication    # Hypertension: Noted on problem list     # DMII: A1C = 7.5 % (Ref range: <5.7 %) within past 6 months    # " "Severe Obesity: Estimated body mass index is 42.3 kg/m  as calculated from the following:    Height as of this encounter: 1.702 m (5' 7\").    Weight as of this encounter: 122.5 kg (270 lb 1 oz).              Past Medical History    Past Medical History:   Diagnosis Date    Arthritis     right knee    Diabetes mellitus (H)     Fibromyalgia, primary     GERD (gastroesophageal reflux disease)     after her sleeve    Hypertension     Knee pain     Morbid obesity (H)      Medications   Home Meds  Prior to Admission medications    Medication Sig Start Date End Date Taking? Authorizing Provider   ACCU-CHEK GUIDE test strip  9/29/21  Yes Reported, Patient   acetaminophen (TYLENOL) 500 MG tablet Take 1,000 mg by mouth every 6 hours as needed for pain 3/19/20  Yes Reported, Patient   apixaban ANTICOAGULANT (ELIQUIS) 5 MG tablet Take 5 mg by mouth 2 times daily   Yes Unknown, Entered By History   blood glucose monitoring (ACCU-CHEK FASTCLIX) lancets 2 times daily 1/19/21  Yes Reported, Patient   cyclobenzaprine (FLEXERIL) 10 MG tablet Take 10 mg by mouth 3 times daily as needed for muscle spasms 1/21/22  Yes Reported, Patient   docusate sodium (COLACE) 100 MG capsule Take 100 mg by mouth 2 times daily   Yes Unknown, Entered By History   doxycycline hyclate (VIBRAMYCIN) 100 MG capsule Take 100 mg by mouth 2 times daily   Yes Unknown, Entered By History   entecavir (BARACLUDE) 0.5 MG tablet Take 0.5 mg by mouth daily   Yes Unknown, Entered By History   fentaNYL (DURAGESIC) 25 mcg/hr 72 hr patch Place 1 patch onto the skin every 72 hours remove old patch.   Yes Unknown, Entered By History   fluticasone (FLONASE) 50 MCG/ACT nasal spray Spray 2 sprays into both nostrils daily   Yes Unknown, Entered By History   hydrochlorothiazide (HYDRODIURIL) 12.5 MG tablet Take 12.5 mg by mouth daily 6/24/22  Yes Reported, Patient   hydrOXYzine HCl (ATARAX) 25 MG tablet Take 1-2 tablets by mouth every 6 hours as needed for anxiety   Yes Unknown, " Entered By History   insulin glargine (LANTUS PEN) 100 UNIT/ML pen Inject 10 Units Subcutaneous at bedtime   Yes Unknown, Entered By History   loratadine (CLARITIN) 10 MG tablet Take 10 mg by mouth daily   Yes Unknown, Entered By History   melatonin 3 MG tablet Take 3 mg by mouth at bedtime   Yes Unknown, Entered By History   naproxen (NAPROSYN) 500 MG tablet Take 500 mg by mouth 2 times daily as needed for moderate pain 1/21/22  Yes Reported, Patient   oxyCODONE (ROXICODONE) 5 MG tablet Take 5-10 mg by mouth every 4 hours as needed for severe pain   Yes Unknown, Entered By History   Pregabalin (LYRICA) 200 MG capsule Take 200 mg by mouth 2 times daily   Yes Unknown, Entered By History   Prenatal 27-1 MG TABS Take 1 tablet by mouth daily 1/14/21  Yes Reported, Patient   Semaglutide, 2 MG/DOSE, (OZEMPIC, 2 MG/DOSE,) 8 MG/3ML pen Inject 2 mg Subcutaneous every 7 days   Yes Unknown, Entered By History   sertraline (ZOLOFT) 100 MG tablet Take 200 mg by mouth daily   Yes Unknown, Entered By History   traZODone (DESYREL) 50 MG tablet Take 50 mg by mouth nightly as needed for sleep   Yes Unknown, Entered By History   vitamin D2 (ERGOCALCIFEROL) 91536 units (1250 mcg) capsule Take 50,000 Units by mouth once a week 7/28/21  Yes Reported, Patient       Scheduled Meds   apixaban ANTICOAGULANT  5 mg Oral BID    doxycycline hyclate  100 mg Oral BID    entecavir  0.5 mg Oral Daily    fentaNYL  25 mcg Transdermal Q72H    And    fentaNYL   Transdermal Q8H [facility]    insulin aspart  1-7 Units Subcutaneous TID AC    insulin aspart  1-5 Units Subcutaneous At Bedtime    insulin glargine  6 Units Subcutaneous At Bedtime    Pregabalin  200 mg Oral BID    sertraline  200 mg Oral Daily       Infusion Meds   - MEDICATION INSTRUCTIONS -      - MEDICATION INSTRUCTIONS -         Allergies   Allergies   Allergen Reactions    Codeine Sulfate     Penicillins           PHYSICAL EXAMINATION   Temp:  [97.3  F (36.3  C)-97.8  F (36.6  C)] 97.8  F (36.6   C)  Pulse:  [60-79] 72  Resp:  [16] 16  BP: ()/(57-89) 138/89  SpO2:  [94 %-100 %] 100 %    Neuro Exam  Mental Status:  alert, oriented x 3, follows commands, speech clear and fluent, naming and repetition normal  Cranial Nerves:  visual fields intact (tested by nurse), EOMI with normal smooth pursuit, facial sensation intact and symmetric (tested by nurse), facial movements symmetric, hearing not formally tested but intact to conversation, no dysarthria, shoulder shrug equal bilaterally, tongue protrusion midline  Motor:  no abnormal movements, able to move all limbs antigravity spontaneously with no signs of hemiparesis observed, no pronator drift  Reflexes:  unable to test (telestroke)  Sensory:  light touch sensation intact and symmetric throughout upper and lower extremities (assessed by nurse), no extinction on double simultaneous stimulation (assessed by nurse)  Coordination:  normal finger-to-nose and heel-to-shin bilaterally without dysmetria  Station/Gait:  unable to test due to telestroke    Stroke Scales    NIHSS  1a. Level of Consciousness 0-->Alert, keenly responsive   1b. LOC Questions 0-->Answers both questions correctly   1c. LOC Commands 0-->Performs both tasks correctly   2.   Best Gaze 0-->Normal   3.   Visual 0-->No visual loss   4.   Facial Palsy 0-->Normal symmetrical movements   5a. Motor Arm, Left 0-->No drift, limb holds 90 (or 45) degrees for full 10 secs   5b. Motor Arm, Right 0-->No drift, limb holds 90 (or 45) degrees for full 10 secs   6a. Motor Leg, Left 0-->No drift, leg holds 30 degree position for full 5 secs   6b. Motor Leg, right 0-->No drift, leg holds 30 degree position for full 5 secs   7.   Limb Ataxia 0-->Absent   8.   Sensory 0-->Normal, no sensory loss   9.   Best Language 0-->No aphasia, normal   10. Dysarthria 0-->Normal   11. Extinction and Inattention  0-->No abnormality   Total 0 (01/22/24 1415)       Imaging  I personally reviewed all imaging; relevant  findings per HPI.    Labs Data   CBC  Recent Labs   Lab 01/22/24  1038 01/21/24  1903   WBC 4.1 4.5   RBC 3.32* 3.43*   HGB 9.8* 10.1*   HCT 30.7* 31.7*    203     Basic Metabolic Panel   Recent Labs   Lab 01/22/24  1303 01/22/24  1038 01/22/24  0901 01/22/24  0141 01/21/24  1903   NA  --  143  --   --  139   POTASSIUM  --  3.9  --   --  4.1   CHLORIDE  --  107  --   --  102   CO2  --  24  --   --  27   BUN  --  15.3  --   --  21.4*   CR  --  0.90  --   --  1.22*   * 158* 112*   < > 146*   CELINA  --  9.2  --   --  9.8    < > = values in this interval not displayed.     Liver Panel  Recent Labs   Lab 01/22/24  1038 01/21/24  2026   PROTTOTAL 6.7 6.9   ALBUMIN 3.9 3.8   BILITOTAL <0.2 <0.2   ALKPHOS 91 92   AST 24 23   ALT 17 19     INR    Recent Labs   Lab Test 01/21/24  1948   INR 1.23*           Stroke Consult Data Data   Telestroke Service Details  (for non-emergent stroke consult with tele)  Video start time 01/22/24   1414   Video end time 01/22/24   1457   Type of service telemedicine diagnostic assessment of acute neurological changes   Reason telemedicine is appropriate patient requires assessment with a specialist for diagnosis and treatment of neurological symptoms   Mode of transmission secure interactive audio and video communication per Nick   Originating site (patient location) [facility]    Distant site (provider location) [facility]       I have personally spent a total of 70 minutes providing care today, time spent in reviewing medical records and devising the plan as recorded above.

## 2024-01-22 NOTE — H&P
[facility]    History and Physical - Hospitalist Service       Date of Admission:  1/21/2024    Assessment & Plan      Shana Ventura is a 58 year old female admitted on 1/21/2024. She has a history of B-cell lymphoma in remission, hypertension, DM 2, chronic anemia, recurrent PE on DOAC who presents to the ED for evaluation of altered mental status, weakness, nausea/vomiting.  Reportedly woke up in her normal state this morning, took pain medications prior to getting in the shower, afterwards was extremely weak and altered.  Very different from baseline per her family.  Initial evaluation in the ED for stroke given presenting symptoms, CT imaging was essentially negative apart from one vascular defect that was presumed to be artifact, although additional MRI recommended for further evaluation.  Remainder of workup revealed bacteriuria and positive COVID-19 testing.  Believe presentation is a combination of possible UTI, COVID infection, and contribution of polypharmacy given patient's complex pain history and pain regimen.  Received antibiotics in the ED, additional MRI of the brain pending, consult to pain management given concerns for contribution of polypharmacy.    COVID-19 infection  Nausea, vomiting  Weakness  Toxic, metabolic encephalopathy  Patient reportedly took all of her usual medications, got in the shower, and afterwards was extremely off from her baseline.  Was reportedly somnolent, slurring words, nauseous, vomiting, and very weak.  Due to the symptoms on presentation patient was initially evaluated for stroke, but head imaging was pretty reassuring with the only abnormality described as likely artifact.  Patient did test positive for COVID-19, although this would be a slightly unusual presentation for COVID infection.  Possible that part of patient's presentation related to COVID infection, but likely coupled with polypharmacy as patient is on a number of sedating pain  medications because of her chronic pain related to cancer and spinal surgery she had related to her cancer.  Per radiology recommendations, will obtain MRI of the neck with contrast to rule out any vascular abnormality in the area that had been called likely artifact.  - No COVID-specific therapies given no hypoxia or abnormal chest imaging  - Follow-up MR a neck and brain  - Stroke neurology consulted, appreciate assistance  - Every 4 hours neurochecks  - Holding outpatient pain regimen, consulting pain management for assistance with restarting given concerns for polypharmacy  - Holding apixaban for now, restart in morning if head imaging negative    Possible urinary tract infection  Bacteriuria  Acute kidney injury  Creatinine on arrival was 1.22, baseline appears closer to 0.9.  Urinalysis showed WBCs as well as positive leukocyte esterase and nitrates.  Patient was not endorsing any classic UTI symptoms, but with altered mental status received dose of ceftriaxone in the ED.  Will hold off on ordering additional antibiotics for now, as patient's mental status clears can question further about UTI symptoms and continue treatment if indicated.  - Follow-up urine culture  - Holding additional antibiotics for now, ask about UTI symptoms when mental status more clear  - Recheck BMP in a.m.    Essential hypertension  On HCTZ prior to admission.  Some relative hypotension, low normal blood pressures so far during admission so holding this for now.    Type 2 diabetes mellitus   On Lantus 10 units daily, semaglutide PTA.  Will continue PTA Lantus dosing with sliding scale insulin during admission.    Chronic normocytic anemia  Hemoglobin 10.1 on admission.  Seems consistent with baseline which appears to be between 10/11..  Will recheck with daily CBC.    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  Complex pain situation with combination of contributing issues including C-spine pain from excisional surgery during cancer treatment and subsequent C7-T12 laminectomy, multiple areas of degenerative joint disease including right knee, left wrist, left sternoclavicular joint.  Given altered mental status on presentation to ED we will try to limit pain medications for now simply polypharmacy likely played at least some role in her presentation with altered mental status.  Will consult pain management team to assist with restarting pain medications given AMS and somnolence on presentation.  - Consider pain management consult    Recurrent PE in setting of cancer  Multiple PE in the setting of active CLL.  Anticoagulated with apixaban.  Will hold evening dose as patient is undergoing additional imaging to ensure there is no stroke.  Can restart in a.m. if head imaging is normal.  - Hold DOAC for now, restart if head imaging negative    Possible hepatitis B infection  Per chart review and medication reconciliation patient is taking Entecavir.  Reviewed patient's medical record and found a single hepatitis B antibody screening positive test from [facility] in April of last year, however unable to find any additional information regarding HPV infection.  Pharmacy did confirm patient is currently taking Entecavir, when she is more clear would recommend asking her about hepatitis infection.  Will continue Entecavir for now and will add on hepatitis B panel.  - Follow-up HBV testing  - Continue Entecavir            Diet: NPO for Medical/Clinical Reasons Except for: Meds    DVT Prophylaxis: Low Risk/Ambulatory with no VTE prophylaxis indicated  Bee Catheter: Not present  Lines: None     Cardiac Monitoring: None  Code Status:  Full code    Clinically Significant Risk Factors Present on Admission               # Drug Induced Coagulation  "Defect: home medication list includes an anticoagulant medication    # Hypertension: Noted on problem list      # Severe Obesity: Estimated body mass index is 44.06 kg/m  as calculated from the following:    Height as of this encounter: 1.676 m (5' 6\").    Weight as of this encounter: 123.8 kg (273 lb).              Disposition Plan           Juan Carver MD  Hospitalist Service  [facility]  Securely message with [employer] (more info)  Text page via [employer] Paging/Directory     ______________________________________________________________________    Chief Complaint   Weakness, slurring words, nausea/vomiting    History is obtained from the patient and chart review    History of Present Illness   Shana Ventura is a 58 year old female who has a history of B-cell lymphoma, hypertension, chronic pain with complex pain regimen, hypertension presents to the ED for evaluation of rather sudden onset of weakness, slurring words, nausea/vomiting.  Reportedly awoke in her usual state, had taken pain medications prior to taking a shower, and according to family later in the evening they had noticed she was very weak, experienced nausea/vomiting, was slurring her speech.  Wildly abnormal from baseline per the family.  Patient had not endorsed any illness type symptoms, denied any recent fevers, chills, night sweats.    In the ED labs showed a normal WBC, mild creatinine elevation, normal lactate.  EKG nonischemic, troponin normal.  CXR unremarkable.  CT stroke series reassuring except for an abnormality in right V1, thought to be possibly artifact per radiology recommending MRI of the brain to be certain.  COVID-19 test was positive.  Urinalysis did show some signs of infection.  Received 1 dose of IV ceftriaxone.      Past Medical History    Past Medical History:   Diagnosis Date    Arthritis     right knee    Diabetes mellitus (H)     Fibromyalgia, primary     GERD (gastroesophageal reflux disease)     " after her sleeve    Hypertension     Knee pain     Morbid obesity (H)        Past Surgical History   Past Surgical History:   Procedure Laterality Date     SECTION      GASTRECTOMY LAPAROSCOPIC SLEEVE  2013    Dr. Nathan    IR LYMPH NODE BIOPSY  10/4/2021    PARTIAL HYSTERECTOMY         Prior to Admission Medications   Prior to Admission Medications   Prescriptions Last Dose Informant Patient Reported? Taking?   ACCU-CHEK GUIDE test strip   Yes Yes   Pregabalin (LYRICA) 200 MG capsule 2024 at x1  Yes Yes   Sig: Take 200 mg by mouth 2 times daily   Prenatal 27-1 MG TABS   Yes Yes   Sig: Take 1 tablet by mouth daily   Semaglutide, 2 MG/DOSE, (OZEMPIC, 2 MG/DOSE,) 8 MG/3ML pen 2024  Yes Yes   Sig: Inject 2 mg Subcutaneous every 7 days   acetaminophen (TYLENOL) 500 MG tablet Unknown  Yes Yes   Sig: Take 1,000 mg by mouth every 6 hours as needed for pain   apixaban ANTICOAGULANT (ELIQUIS) 5 MG tablet 2024 at x1  Yes Yes   Sig: Take 5 mg by mouth 2 times daily   blood glucose monitoring (ACCU-CHEK FASTCLIX) lancets   Yes Yes   Si times daily   cyclobenzaprine (FLEXERIL) 10 MG tablet 2024 at x1  Yes Yes   Sig: Take 10 mg by mouth 3 times daily as needed for muscle spasms   docusate sodium (COLACE) 100 MG capsule 2024 at x1  Yes Yes   Sig: Take 100 mg by mouth 2 times daily   doxycycline hyclate (VIBRAMYCIN) 100 MG capsule 2024 at x1  Yes Yes   Sig: Take 100 mg by mouth 2 times daily   entecavir (BARACLUDE) 0.5 MG tablet 2024 at AM  Yes Yes   Sig: Take 0.5 mg by mouth daily   fentaNYL (DURAGESIC) 25 mcg/hr 72 hr patch 2024 at Removed by EMS today  Yes Yes   Sig: Place 1 patch onto the skin every 72 hours remove old patch.   fluticasone (FLONASE) 50 MCG/ACT nasal spray 2024  Yes Yes   Sig: Spray 2 sprays into both nostrils daily   hydrOXYzine HCl (ATARAX) 25 MG tablet Unknown  Yes Yes   Sig: Take 1-2 tablets by mouth every 6 hours as needed for anxiety    hydrochlorothiazide (HYDRODIURIL) 12.5 MG tablet 1/21/2024  Yes Yes   Sig: Take 12.5 mg by mouth daily   insulin glargine (LANTUS PEN) 100 UNIT/ML pen 1/20/2024 at HS  Yes Yes   Sig: Inject 10 Units Subcutaneous at bedtime   loratadine (CLARITIN) 10 MG tablet 1/21/2024  Yes Yes   Sig: Take 10 mg by mouth daily   melatonin 3 MG tablet   Yes Yes   Sig: Take 3 mg by mouth at bedtime   naproxen (NAPROSYN) 500 MG tablet Unknown  Yes Yes   Sig: Take 500 mg by mouth 2 times daily as needed for moderate pain   oxyCODONE (ROXICODONE) 5 MG tablet 1/21/2024 at 1400  Yes Yes   Sig: Take 5-10 mg by mouth every 4 hours as needed for severe pain   sertraline (ZOLOFT) 100 MG tablet 1/21/2024  Yes Yes   Sig: Take 200 mg by mouth daily   traZODone (DESYREL) 50 MG tablet   Yes Yes   Sig: Take 50 mg by mouth nightly as needed for sleep   vitamin D2 (ERGOCALCIFEROL) 16544 units (1250 mcg) capsule 1/21/2024  Yes Yes   Sig: Take 50,000 Units by mouth once a week      Facility-Administered Medications: None        Review of Systems    The 10 point Review of Systems is negative other than noted in the HPI or here.      Physical Exam   Vital Signs: Temp: 97.6  F (36.4  C) Temp src: Oral BP: 92/68 Pulse: 67   Resp: 16 SpO2: 95 % O2 Device: None (Room air)    Weight: 273 lbs 0 oz    Constitutional: Lying in bed, somnolent, arouses to voice but sleepy during exam  Respiratory: Lungs clear to auscultation bilaterally, normal work of breathing, normal respiratory rate  Cardiovascular: RRR, no MRG's  GI: Soft, nontender, nondistended  Neurologic: Answers orientation questions when prompted, somnolent during exam and needing frequent arousal but engaged in conversation and answering questions appropriately when awake.  No focal deficits, strength 5/5 in bilateral upper and lower extremities    Medical Decision Making       75 MINUTES SPENT BY ME on the date of service doing chart review, history, exam, documentation & further activities per the  note.      Data     I have personally reviewed the following data over the past 24 hrs:    4.5  \   10.1 (L)   / 203     139 102 21.4 (H) /  146 (H)   4.1 27 1.22 (H) \     ALT: 19 AST: 23 AP: 92 TBILI: <0.2   ALB: 3.8 TOT PROTEIN: 6.9 LIPASE: 19     Trop: 6 BNP: N/A     TSH: 0.75 T4: N/A A1C: N/A     Procal: N/A CRP: N/A Lactic Acid: 1.1       INR:  1.23 (H) PTT:  27   D-dimer:  N/A Fibrinogen:  N/A       Imaging results reviewed over the past 24 hrs:   Recent Results (from the past 24 hour(s))   CT Head w/o Contrast    Narrative    EXAM: CT HEAD W/O CONTRAST  LOCATION: [facility]  DATE: 1/21/2024    INDICATION: Code Stroke to evaluate for potential thrombolysis and thrombectomy. Code stroke. Nausea, vomiting, dizziness.  COMPARISON: None.  TECHNIQUE: Routine CT Head without IV contrast. Multiplanar reformats. Dose reduction techniques were used.    FINDINGS:  INTRACRANIAL CONTENTS: No intracranial hemorrhage, extraaxial collection, or mass effect.  No CT evidence of acute infarct. Mild to moderate presumed chronic small vessel ischemic changes. Normal ventricles and sulci. Right inferior frontal lobe small   area of tissue loss and gliosis.    VISUALIZED ORBITS/SINUSES/MASTOIDS: No intraorbital abnormality. Mild to moderate mucosal thickening scattered about the paranasal sinuses. Mastoid air cells patchy opacification.    BONES/SOFT TISSUES: No acute abnormality.      Impression    IMPRESSION:  1.  No acute intracranial hemorrhage.  2.  No CT evidence acute infarct. Aspect score 10.  3.  Chronic intracranial changes described above.    DR AKIN VELASQUEZ was notified by Dr Babar Novoa at  8:10 PM 01/21/2024 CST/CDT.   CTA Head Neck with Contrast    Narrative    EXAM: CTA HEAD NECK W CONTRAST  LOCATION: [facility]  DATE: 1/21/2024    INDICATION: Code Stroke to evaluate for potential thrombolysis and thrombectomy. Code stroke. Nausea, vomiting, dizziness.  COMPARISON:  None.  CONTRAST: 67mL Isovue 370  TECHNIQUE: Head and neck CT angiogram with IV contrast. Axial helical CT images of the head and neck vessels obtained during the arterial phase of intravenous contrast administration. Axial 2D reconstructed images and multiplanar 3D MIP reconstructed   images of the head and neck vessels were performed by the technologist. Dose reduction techniques were used. All stenosis measurements made according to NASCET criteria unless otherwise specified.    FINDINGS:   HEAD CTA:  Left cavernous ICA and left carotid siphon mild stenosis.    Bilateral distal V4 segments demonstrate to moderate irregular stenoses.    No additional significant stenosis or occlusion.      Left supraclinoid ICA demonstrates a small 2 mm inferior outpouching may reflect infundibulum with poorly visualized apical artery or small aneurysm.     Otherwise, no brain aneurysm. No AVM/AVF.      NECK CTA:  Bilateral extracranial carotid arteries demonstrate a retropharyngeal medially deviated course impressing upon the aerodigestive airway.    RIGHT CAROTID:   No significant stenosis/occlusion. No dissection.    LEFT CAROTID:   No significant stenosis/occlusion. No dissection.    VERTEBRAL ARTERIES:  Right V1 segment gives the appearance of internal clot/thrombus, however there is overlying artifact and poor contrast opacification. Findings are favored to be artifactual.      Otherwise, no significant stenosis or occlusion. No dissection.      AORTIC ARCH: Classic aortic arch anatomy. No significant stenosis at the origin of the great vessels.    ARTERIAL PLAQUE: Scattered calcified/noncalcified plaque.    NONVASCULAR STRUCTURES:   No significant findings.        Impression     IMPRESSION:   HEAD CTA:   1.  No intracranial arterial large vessel occlusion.    2.  Left cavernous ICA and left carotid siphon mild stenosis.    3.  Bilateral distal V4 segments demonstrate to moderate irregular stenoses.    4.  Left supraclinoid  ICA demonstrates a small 2 mm inferior outpouching may reflect infundibulum with poorly visualized apical artery or small aneurysm.       NECK CTA:  1.  Right V1 segment gives the appearance of internal clot/thrombus, however there is overlying artifact and poor contrast opacification. Findings are favored to be artifactual. Postcontrast MRA neck with IV contrast would be of benefit if clinically   warranted.    2.  Otherwise, no significant stenosis or occlusion. No dissection.     DR AKIN VELASQUEZ was notified by Dr Babar Novoa at  8:25 PM 01/21/2024 CST/CDT.     Chest XR,  PA & LAT    Narrative    EXAM: XR CHEST 2 VIEWS  LOCATION: [facility]  DATE: 1/21/2024    INDICATION: hypotension  COMPARISON: 04/10/2022      Impression    IMPRESSION: Heart size is normal. Lung volumes are mildly diminished with basilar atelectasis or scarring. No CHF lobar consolidation or effusion. Mediastinum and bony structures are unremarkable.

## 2024-01-22 NOTE — PROGRESS NOTES
MD paged: Patients blood sugar is 86, would you still like lantus given? Patient is also requesting food. She is NPO but she is alert and oriented now, MRI testing done. Can she eat?    MD stated to still  give the patient her lantus and got a diet order for regular diet.

## 2024-01-22 NOTE — PLAN OF CARE
"Goal Outcome Evaluation:      PRIMARY DIAGNOSIS: \"GENERIC\" NURSING  OUTPATIENT/OBSERVATION GOALS TO BE MET BEFORE DISCHARGE:  ADLs back to baseline: No    Activity and level of assistance: has not been out of bed    Pain status: Pain free.    Return to near baseline physical activity: No     Discharge Planner Nurse   Safe discharge environment identified: Yes  Barriers to discharge: Yes       Entered by: Liza Brooks RN 01/22/2024 4:23 AM  VS stable. Diminished LS. No complaints of pain. Blood sugar level of 86. Neuro's intact. Resting comfortably.      Please review provider order for any additional goals.   Nurse to notify provider when observation goals have been met and patient is ready for discharge.                  "

## 2024-01-22 NOTE — ED TRIAGE NOTES
Pt BIBA for vertigo, nausea, double ear infection.Pt was from a community event, pt become dizzy, lightheaded and lethargic. Pt vomited approx. 600 ml as per EMS. Zofran was given.  Upon arrival  pt is lethargic, still dizzy and nauseous.  Denies chest pain, SOB, abd pain, headache.  Pt on blood thinners.  Took oxycodone at 1400 as per EMS.

## 2024-01-23 VITALS
DIASTOLIC BLOOD PRESSURE: 86 MMHG | HEIGHT: 67 IN | BODY MASS INDEX: 42.39 KG/M2 | SYSTOLIC BLOOD PRESSURE: 110 MMHG | OXYGEN SATURATION: 97 % | WEIGHT: 270.06 LBS | RESPIRATION RATE: 18 BRPM | TEMPERATURE: 98.7 F | HEART RATE: 70 BPM

## 2024-01-23 DIAGNOSIS — I63.9 CEREBROVASCULAR ACCIDENT (CVA), UNSPECIFIED MECHANISM (H): Primary | ICD-10-CM

## 2024-01-23 LAB
BACTERIA UR CULT: ABNORMAL
ENTEROCOCCUS FAECALIS: NOT DETECTED
ENTEROCOCCUS FAECIUM: NOT DETECTED
GLUCOSE BLDC GLUCOMTR-MCNC: 129 MG/DL (ref 70–99)
GLUCOSE BLDC GLUCOMTR-MCNC: 79 MG/DL (ref 70–99)
GLUCOSE BLDC GLUCOMTR-MCNC: 84 MG/DL (ref 70–99)
LISTERIA SPECIES (DETECTED/NOT DETECTED): NOT DETECTED
STAPHYLOCOCCUS AUREUS: NOT DETECTED
STAPHYLOCOCCUS EPIDERMIDIS: NOT DETECTED
STAPHYLOCOCCUS LUGDUNENSIS: NOT DETECTED
STAPHYLOCOCCUS SPECIES: DETECTED
STREPTOCOCCUS AGALACTIAE: NOT DETECTED
STREPTOCOCCUS ANGINOSUS GROUP: NOT DETECTED
STREPTOCOCCUS PNEUMONIAE: NOT DETECTED
STREPTOCOCCUS PYOGENES: NOT DETECTED
STREPTOCOCCUS SPECIES: NOT DETECTED

## 2024-01-23 PROCEDURE — 250N000013 HC RX MED GY IP 250 OP 250 PS 637: Performed by: PHYSICIAN ASSISTANT

## 2024-01-23 PROCEDURE — 250N000013 HC RX MED GY IP 250 OP 250 PS 637: Performed by: STUDENT IN AN ORGANIZED HEALTH CARE EDUCATION/TRAINING PROGRAM

## 2024-01-23 PROCEDURE — 99239 HOSP IP/OBS DSCHRG MGMT >30: CPT | Performed by: NURSE PRACTITIONER

## 2024-01-23 PROCEDURE — 250N000013 HC RX MED GY IP 250 OP 250 PS 637: Performed by: NURSE PRACTITIONER

## 2024-01-23 PROCEDURE — G0378 HOSPITAL OBSERVATION PER HR: HCPCS

## 2024-01-23 PROCEDURE — 82962 GLUCOSE BLOOD TEST: CPT

## 2024-01-23 RX ORDER — ACETAMINOPHEN 325 MG/1
650 TABLET ORAL EVERY 4 HOURS PRN
Status: DISCONTINUED | OUTPATIENT
Start: 2024-01-23 | End: 2024-01-23 | Stop reason: HOSPADM

## 2024-01-23 RX ORDER — CYCLOBENZAPRINE HCL 10 MG
10 TABLET ORAL 3 TIMES DAILY PRN
Qty: 45 TABLET | Refills: 1 | Status: SHIPPED | OUTPATIENT
Start: 2024-01-23

## 2024-01-23 RX ORDER — ATORVASTATIN CALCIUM 10 MG/1
40 TABLET, FILM COATED ORAL EVERY EVENING
Status: DISCONTINUED | OUTPATIENT
Start: 2024-01-23 | End: 2024-01-23 | Stop reason: HOSPADM

## 2024-01-23 RX ORDER — ACETAMINOPHEN 500 MG
1000 TABLET ORAL EVERY 6 HOURS PRN
Qty: 100 TABLET | Refills: 1 | Status: SHIPPED | OUTPATIENT
Start: 2024-01-23

## 2024-01-23 RX ORDER — ENTECAVIR 0.5 MG/1
0.5 TABLET, FILM COATED ORAL DAILY
Start: 2024-01-23

## 2024-01-23 RX ORDER — ACETAMINOPHEN 650 MG/1
650 SUPPOSITORY RECTAL EVERY 4 HOURS PRN
Status: DISCONTINUED | OUTPATIENT
Start: 2024-01-23 | End: 2024-01-23 | Stop reason: HOSPADM

## 2024-01-23 RX ADMIN — ENTECAVIR 0.5 MG: 0.5 TABLET ORAL at 09:10

## 2024-01-23 RX ADMIN — ACETAMINOPHEN 650 MG: 325 TABLET, FILM COATED ORAL at 10:57

## 2024-01-23 RX ADMIN — DOCUSATE SODIUM 100 MG: 100 CAPSULE, LIQUID FILLED ORAL at 09:10

## 2024-01-23 RX ADMIN — DOXYCYCLINE HYCLATE 100 MG: 100 CAPSULE ORAL at 09:10

## 2024-01-23 RX ADMIN — OXYCODONE HYDROCHLORIDE 5 MG: 5 TABLET ORAL at 04:39

## 2024-01-23 RX ADMIN — PREGABALIN 200 MG: 100 CAPSULE ORAL at 09:10

## 2024-01-23 RX ADMIN — ACETAMINOPHEN 650 MG: 325 TABLET, FILM COATED ORAL at 02:33

## 2024-01-23 RX ADMIN — SERTRALINE HYDROCHLORIDE 200 MG: 100 TABLET ORAL at 09:11

## 2024-01-23 RX ADMIN — APIXABAN 5 MG: 5 TABLET, FILM COATED ORAL at 09:10

## 2024-01-23 ASSESSMENT — ACTIVITIES OF DAILY LIVING (ADL)
ADLS_ACUITY_SCORE: 22

## 2024-01-23 NOTE — PLAN OF CARE
Patient's After Visit Summary was reviewed with patient    Patient verbalized understanding of After Visit Summary, recommended follow up and was given an opportunity to ask questions.   Discharge medications sent home with patient/family: YES   Discharged with other: [facility]

## 2024-01-23 NOTE — DISCHARGE SUMMARY
"[facility]  Hospitalist Discharge Summary      Date of Admission:  1/21/2024  Date of Discharge:  1/23/2024  Discharging Provider: OLGA Mcelroy CNP, PA-S  Discharge Service: Hospitalist Service    Discharge Diagnoses   Acute/subacute left superior cerebellar peduncle infarct   VERNON-resolved  Bacteriuria  T2DM  B-cell lymphoma    Clinically Significant Risk Factors     # DMII: A1C = 7.5 % (Ref range: <5.7 %) within past 6 months    # Severe Obesity: Estimated body mass index is 42.3 kg/m  as calculated from the following:    Height as of this encounter: 1.702 m (5' 7\").    Weight as of this encounter: 122.5 kg (270 lb 1 oz).       Follow-ups Needed After Discharge   Pt will need to follow up with PCP in 1-2 weeks for review of hospital stay and repeat UA.     Unresulted Labs Ordered in the Past 30 Days of this Admission       No orders found from 12/22/2023 to 1/22/2024.        These results will be followed up by PCP    Discharge Disposition   Discharged to home  Condition at discharge: Stable    Hospital Course     Shana Ventura is a 58 year old female admitted on 1/21/2024. She has a history of B-cell lymphoma in remission, hypertension, DM2, chronic anemia, recurrent PE on DOAC who presents to the ED for evaluation of altered mental status, weakness, nausea/vomiting.  Reportedly woke up in her normal state the morning of 1/21, took pain medications prior to attending a baby shower, afterwards was extremely weak and altered.  Very different from baseline per her family.       Initial evaluation in the ED pt hypotensive initially in the 80s/40s and remained soft, HR 60s-70s, SpO2 mid 90s on room air. Evaluated for stroke given presenting symptoms, CT imaging was essentially negative apart from one vascular defect that was presumed to be artifact, although neurology consulted and recommended additional MRI for further evaluation. Remainder of workup revealed UA positive for " nitrites, leukocyte esterase, WBC, and hyaline casts. Positive COVID-19 testing. BMP significant for elevated urea nitrogen-21.4, creatinine-1.22, CFR-52, and glucose- 146. CBS shows reduced RBC-3.43, hemoglobin 10.1, hematocrit 31.7 and absolute lymphocytes .4. She received narcan, ceftriaxone and 2 L NS. EKG without ischemic changes. Negative troponin.      Acute/subacute left superior cerebellar peduncle infarct   Nausea, vomiting  Initial concern for encephalopathy, resolved, likely stroke related   Patient reportedly took all of her usual medications, went to the baby shower, and afterwards was extremely off from her baseline.  Was reportedly somnolent, slurring words, nauseous, vomiting, and very weak. Possibly an element of vertigo based on her description. Due to the symptoms on presentation patient was initially evaluated for stroke. Head CT did not show acute infarct with abnormality described as likely artifact. Follow-up MRI was ordered. Significant findings include acute/subacute infarct in the left superior cerebellar peduncle without hemorrhagic conversion.   -Stroke neurology recommendations: high intensity statin therapy, continue PTA Apixaban, goal HgA1c <7.0 and BP <130/80 (allow for permissive HTN on 1/22)   -monitor on telemetry   -Every 4 hours neuro checks  -(1/22/2024and 1/23/2024) pt A&Ox4. No focal deficits on neuro exam   -ECHO performed on 1/22/24 did not find thrombus in L ventricle, bubble study negative, EF 55-66%  -stroke neurology recommending CT of chest/abdomen/pelvis to evaluate for malignancy with recent history of lymphoma (refer to below)  -no current therapy needs since at baseline mobility   -Pt initially hesitant to starting statin therapy, but after further discussion she agrees to being prescribed this medication. Will start 40mg Atorvastatin per stroke neuro recommendation   -Follow-up with Loyda Tijerina NP, on 1/31/2024, arrive at 1045 for 1100 appointment       Possible  urinary tract infection  Bacteriuria  Acute kidney injury-resolved  Creatinine on arrival was 1.22, baseline appears closer to 0.9.  Urinalysis showed WBCs as well as positive leukocyte esterase and nitrates.  Patient was not endorsing any classic UTI symptoms, but with altered mental status received dose of ceftriaxone in the ED.  Will hold off on ordering additional antibiotics, on reassessment on 1/22 patient denies urinary symptoms and currently afebrile with no leukocytosis.  - follow urine culture, resulted with 50,000-100,000 gram negative bacilli. Pt continues to deny UTI sx. We will hold off on treatment for now and pt should have repeat UA during fallow-up visit with PCP in 1-2 weeks.  - suspect VERNON related to dehydration from vomiting prior to admission, encourage oral intake    -1/22/24 AM CBC unremarkable. Urea nitrogen, creatinine and GFR within normal limits.     Covid-19 Infection  Patient did test positive for COVID-19, although initial altered mental status would be a slightly unusual presentation for COVID infection. No cough, sob, wheezing, crackles noted on exam. No evidence of covid pneumonia on XR.  -monitor for hypoxia  -supportive cares if needed     Essential hypertension  On HCTZ prior to admission. Some relative hypotension, low normal blood pressures so far during admission so holding this for now. BPs on 1/22/2024 in 130s/80s-90s  -Stroke neurology recommends holding hydrochlorothiazide until follow up with PCP in 1-2 weeks. Pt instructed to monitor and record BP at home and bring records to PCP visit.      Type 2 diabetes mellitus   HgA1c- 7.5 (1/21/2024)  Hypoglycemia episode (1/22)  On Lantus 10 units daily, semaglutide PTA.    -episode of hypoglycemia 1/22/2024 AM with value of 64. Was given orange juice with improvement.   -Continue Lantus dose decreased to 6 units evening of 1/22. Pt had glucose of 79 at 0215. Will keep dose at 6 units. PCP should address this in follow up visit.       Chronic normocytic anemia  Hemoglobin 10.1 on admission. Seems consistent with baseline which appears to be between 10/11.    -1/22/2024 AM CBC hemoglobin at 9.8 stable. No evidence of bleeding. No need to recheck.      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  Complex pain situation with combination of contributing issues including C-spine pain from excisional surgery during cancer treatment and subsequent C7-T12 laminectomy, multiple areas of degenerative joint disease including right knee, left wrist, left sternoclavicular joint.   -Pain management less likely to have been contributing to pts AMS on presentation given new diagnosis of cerebellar infarct and well no change in mentation with narcan initially   -continue PTA fentanyl patch and oxy  -no current need for pain management consult here, continue outpatient follow up through [facility]  - CT chest/abdomen/pelvis ordered by stroke neurology to evaluate for malignancy. No evidence for adenopathy within c/a/p. Other findings include mild emphysema without acute pulmonary disease, fatty liver, and gastric bypass.      Recurrent PE in setting of cancer  Multiple PE in the setting of active CLL.  Anticoagulated with apixaban. Evening dose held on 1/21/2024 in anticipation of follow-up MRI.  -resume Apixaban on 1/22      Possible hepatitis B infection  Per chart review and medication reconciliation patient is taking Entecavir.  Reviewed patient's medical record and found a single hepatitis B antibody screening positive test from [facility] in April of last year, however unable to find any additional information regarding HBV infection.  Pharmacy did confirm patient is currently taking Entecavir.  - Follow-up HBV testing shows negative B surface  "antigen, negative B surface antibody, negative IgM, and positive B core antibody  - Continue Entecavir, need to discuss further details with patient about hepatitis infection but appears this was tested in 02/2023 by her oncology team (did not question her about this on 1/22)      RUQ pain  Constipation  Pt describes having chronic abdominal pain associated with chronic constipation due to chronic opioid use. Pt reports RUQ pain after breakfast 1/23. Will continue to monitor for now and seek further evaluation if this pain worsens.  -Rechecked abdominal pain 1/23 AM. Pt continues to have mild tenderness to RUQ. Pt has not changed since yesterday pt still endorses this being a chronic issue for her due to constipation.  -resume PTA Colace for constipation      Recent pansinusitis: diagnosed at Urgent Care on 1/16 when presenting with cough with sinus congestion as well drainge and currently on Doxycycline for treatment.  -continue PTA Doxycycline     Headache  Pt developed a 6/10 headache evening of 1/22. Was given Tylenol, which improved her symptoms.     Consultations This Hospital Stay   PHYSICAL THERAPY ADULT IP CONSULT  OCCUPATIONAL THERAPY ADULT IP CONSULT  NEUROLOGY IP STROKE CONSULT  SPEECH LANGUAGE PATH ADULT IP CONSULT  PHARMACY IP CONSULT  PHARMACY IP CONSULT  PHARMACY IP CONSULT  CARE MANAGEMENT / SOCIAL WORK IP CONSULT  PAIN MANAGEMENT ADULT IP CONSULT  SMOKING CESSATION PROGRAM IP CONSULT    Code Status   Prior    Time Spent on this Encounter   I, OLGA Mcelroy CNP, personally saw the patient today and spent greater than 30 minutes discharging this patient.     MICHAEL Benton APRN CNP,   [facility] OBSERVATION DEPT  [address] [place] [address]  Phone: [phone]    Attestation:  \"I was present with the student who participated in the service and in the documentation of the note. I have verified the history and personally performed " "the physical exam and medical decision-making. I agree with the assessment and plan of care as documented in the note.\"     As above.  No new complaints.  VSS.  Discussed with Neurology.  Appreciate their assistance.  Currently with [facility] but wanting to establish care with [facility].    Asymptomatic COVID  No residual deficits from CVA perspective.    Treat CVA as follows:    Secondary Stroke Prevention  - Continue Eliquis 5 mg BID, minimize interruptions  - LDL 84. Goal LDL 40-70. She is agreeable to trial statin, recommend Lipitor 40 mg daily.   - A1c 7.5 (improved from 9.3 in September), goal <7.0  - Avoid hypotension. Long term goal BP <130/80 with tighter control associated with decreased overall CV risk, if tolerated. Discussed the importance of home BP monitoring and keeping a log for PCP.  - PT/OT/SLP  - Stroke education. Discussed stroke warning signs (BE FAST) and need for emergent presentation if symptoms occur  - Mediterranean diet  - Tobacco screen: prior smoker   - EDIN screen: negative screen.      Follow-Up:  - 1-2 weeks with PCP   - 6-8 weeks with stroke BECCA (order placed)       GEN:   Alert, oriented x 3, appears comfortable, NAD.  NECK:   Supple ,no mass or thyromegaly   HEENT:  Normocephalic/atraumatic, no scleral icterus, no nasal discharge, mouth moist.  CV:   Regular rate and rhythm, no murmur or JVD.  S1 + S2 noted, no S3 or S4.  LUNGS:   Clear to auscultation bilaterally without rales/rhonchi/wheezing/retractions.  Symmetric chest rise on inhalation noted.  ABD:   Active bowel sounds, soft, non-tender/non-distended.  No rebound/guarding/rigidity.  EXT:   No edema.  No cyanosis.  No joint synovitis noted.  SKIN:   Dry to touch, no exanthems noted in the visualized areas.  Neurologic: Grossly intact,non focal.  Neuropsychiatric:  General: normal, calm and normal eye contact  Level of consciousness: alert / normal  Affect: normal  Orientation: oriented to self, place, time and situation "   Ulysses Alejo Ed.D, APRN, CNP     ______________________________________________________________________    1/23/24 Pt is back to baseline ADLs. Repeat neuro tests is negative. Pt has agreed to Statin therapy recommended by stroke neuro team after discussion of risks and benefits. Pt complained of headache night of 1/22 and was given Tylenol, which improved her symptoms. She has a ride home. No PT needed on discharge.       Physical Exam   Vital Signs:                    Weight: 270 lbs 1.02 oz  VSS per EPIC  General: Comfortably sitting up in bed talking on the phone, interactive, pleasant, NAD  CV: RRR. No murmurs or rubs  Pulm: Non labored breathing. Lungs clear to ascultation bilaterally.  GI: Tenderness to the RUQ upon palpation. All other quadrants non tender. No masses. Normoactive bowel sounds   Neuro: A&Ox4. CN II-XII intact. Equal 5/5 strength to all extremities. Normal heal to shin and finger to nose. Sensation grossly intact.   MSK: Spontaneously moves all extremities.       Primary Care Physician   Physician No Ref-Primary    Discharge Orders      Reason for your hospital stay    You were evaluated in the the hospital for altered mental status, dizziness, nausea, and vomiting. You received a MRI of your head that showed a stroke in your cerebellum, which explains the symptoms you were having. The stroke neurology team saw you and recommends you continue your Apixaban and start 40mg Atorvastatin. We will hold your hydrochlorothiazide until you follow up appointment on 1/31/2024, because your blood pressures were not elevated during your stay. Please check your blood pressure daily, record these values in a notebook, and bring the data to your appointment on 1/31/2024.    The stroke neurology team ordered a CT scan of your chest/abdomen/pelvis during your stay to evaluate  for spread of your lymphoma. This imagine test did NOT show evidence of malignancy.    Your urine was tested for infection  during your stay, which showed that you do have a possible bladder infection. However, you do not have symptoms of fever, urgency, or pain on urination, so we did not treat you with antibiotics. You will need a repeat urine analysis during your primary care visit on 1/31/2024. If you begin to have symptoms of fever, frequent or urgent urination please seek medical care.     During your stay you had episodes of low blood sugar. We changed your daily Lantus medication from 10 units to 6 units to prevent recurrent episodes of low blood sugar. Your primary care provider will need to address this during your visit on 1/31/2024.     Follow-up and recommended labs and tests     Follow up with primary care provider, Lillian Tijerina on 1/31/2024 to evaluate medication change, to evaluate treatment change, for hospital follow- up, and to follow up on results.  The following labs/tests are recommended: UA.    Follow up with your pain team at [place].  You will need to be on your anticoagulation (Eliquis) indefinitely (likely life long).  This makes managing your pain with interventional methods more challenging but not impossible.  No procedures requiring the cessation or holding of your anticoagulation (blood thinner) for at least 1 month, ideally 3 months depending on your pain control.   When you do have surgery or procedures in the future, you are considered higher risk for blood clots (due to history of pulmonary embolism, cancer, and now stroke) and will need to be off anticoagulation for the shortest period of time possible.  This is a risk/benefit discussion that you should have ongoing with your providers.      Follow up with your oncology team at [place].  They can help with the above decisions regarding anticoagulation as they are also hematologist (blood doctors).  Given your history of cancer, pulmonary embolism, and now stroke, recommend indefinite anticoagulation.     Activity    Your activity upon discharge: activity as  tolerated     Diet    Follow this diet upon discharge: Orders Placed This Encounter      Regular Diet Adult     Stroke Hospital Follow Up    Please be aware that coverage of these services is subject to the terms and limitations of your health insurance plan.  Call member services at your health plan with any benefit or coverage questions.  [facility] will call you to coordinate care as prescribed by your provider. If you don t hear from a representative within 2 business days, please call [phone].       Stroke Hospital Follow Up    Please be aware that coverage of these services is subject to the terms and limitations of your health insurance plan.  Call member services at your health plan with any benefit or coverage questions.  [facility] will call you to coordinate care as prescribed by your provider. If you don t hear from a representative within 2 business days, please call [phone].         Significant Results and Procedures   Results for orders placed or performed during the hospital encounter of 01/21/24   CT Head w/o Contrast    Narrative    EXAM: CT HEAD W/O CONTRAST  LOCATION: [facility]  DATE: 1/21/2024    INDICATION: Code Stroke to evaluate for potential thrombolysis and thrombectomy. Code stroke. Nausea, vomiting, dizziness.  COMPARISON: None.  TECHNIQUE: Routine CT Head without IV contrast. Multiplanar reformats. Dose reduction techniques were used.    FINDINGS:  INTRACRANIAL CONTENTS: No intracranial hemorrhage, extraaxial collection, or mass effect.  No CT evidence of acute infarct. Mild to moderate presumed chronic small vessel ischemic changes. Normal ventricles and sulci. Right inferior frontal lobe small   area of tissue loss and gliosis.    VISUALIZED ORBITS/SINUSES/MASTOIDS: No intraorbital abnormality. Mild to moderate mucosal thickening scattered about the paranasal sinuses. Mastoid air cells patchy opacification.    BONES/SOFT TISSUES: No  acute abnormality.      Impression    IMPRESSION:  1.  No acute intracranial hemorrhage.  2.  No CT evidence acute infarct. Aspect score 10.  3.  Chronic intracranial changes described above.    DR AKIN VELASQUEZ was notified by Dr Babar Novoa at  8:10 PM 01/21/2024 CST/CDT.   CTA Head Neck with Contrast    Narrative    EXAM: CTA HEAD NECK W CONTRAST  LOCATION: [facility]  DATE: 1/21/2024    INDICATION: Code Stroke to evaluate for potential thrombolysis and thrombectomy. Code stroke. Nausea, vomiting, dizziness.  COMPARISON: None.  CONTRAST: 67mL Isovue 370  TECHNIQUE: Head and neck CT angiogram with IV contrast. Axial helical CT images of the head and neck vessels obtained during the arterial phase of intravenous contrast administration. Axial 2D reconstructed images and multiplanar 3D MIP reconstructed   images of the head and neck vessels were performed by the technologist. Dose reduction techniques were used. All stenosis measurements made according to NASCET criteria unless otherwise specified.    FINDINGS:   HEAD CTA:  Left cavernous ICA and left carotid siphon mild stenosis.    Bilateral distal V4 segments demonstrate to moderate irregular stenoses.    No additional significant stenosis or occlusion.      Left supraclinoid ICA demonstrates a small 2 mm inferior outpouching may reflect infundibulum with poorly visualized apical artery or small aneurysm.     Otherwise, no brain aneurysm. No AVM/AVF.      NECK CTA:  Bilateral extracranial carotid arteries demonstrate a retropharyngeal medially deviated course impressing upon the aerodigestive airway.    RIGHT CAROTID:   No significant stenosis/occlusion. No dissection.    LEFT CAROTID:   No significant stenosis/occlusion. No dissection.    VERTEBRAL ARTERIES:  Right V1 segment gives the appearance of internal clot/thrombus, however there is overlying artifact and poor contrast opacification. Findings are favored to be artifactual.       Otherwise, no significant stenosis or occlusion. No dissection.      AORTIC ARCH: Classic aortic arch anatomy. No significant stenosis at the origin of the great vessels.    ARTERIAL PLAQUE: Scattered calcified/noncalcified plaque.    NONVASCULAR STRUCTURES:   No significant findings.        Impression     IMPRESSION:   HEAD CTA:   1.  No intracranial arterial large vessel occlusion.    2.  Left cavernous ICA and left carotid siphon mild stenosis.    3.  Bilateral distal V4 segments demonstrate to moderate irregular stenoses.    4.  Left supraclinoid ICA demonstrates a small 2 mm inferior outpouching may reflect infundibulum with poorly visualized apical artery or small aneurysm.       NECK CTA:  1.  Right V1 segment gives the appearance of internal clot/thrombus, however there is overlying artifact and poor contrast opacification. Findings are favored to be artifactual. Postcontrast MRA neck with IV contrast would be of benefit if clinically   warranted.    2.  Otherwise, no significant stenosis or occlusion. No dissection.     DR AKIN VELASQUEZ was notified by Dr Babar Novoa at  8:25 PM 01/21/2024 CST/CDT.     Chest XR,  PA & LAT    Narrative    EXAM: XR CHEST 2 VIEWS  LOCATION: [facility]  DATE: 1/21/2024    INDICATION: hypotension  COMPARISON: 04/10/2022      Impression    IMPRESSION: Heart size is normal. Lung volumes are mildly diminished with basilar atelectasis or scarring. No CHF lobar consolidation or effusion. Mediastinum and bony structures are unremarkable.   MR Brain w/o & w Contrast    Narrative    EXAM: MR BRAIN W/O and W CONTRAST  LOCATION: [facility]  DATE: 1/22/2024    INDICATION: New onset vertigo, vomiting, gait difficulty.  COMPARISON: CTA head neck dated 01/21/2024.  CONTRAST: 10 ml Gadavist.   TECHNIQUE: Routine multiplanar multisequence head MRI without and with intravenous contrast.    FINDINGS:  INTRACRANIAL CONTENTS: There are small  punctate focus of diffusion restriction within the left superior cerebellar peduncle, just above the brachium pontis (image 49 series 3 and image 13 series 4). Additionally there is subtle associated T2/FLAIR signal   hyperintensity in these regions (image 11 series 5 and image 11 series 6). No mass, acute hemorrhage, or extra-axial fluid collections. Patchy nonspecific T2/FLAIR hyperintensities within the cerebral white matter most consistent with mild to moderate   chronic microvascular ischemic change. Normal ventricles and sulci. Normal position of the cerebellar tonsils. No pathologic contrast enhancement.    SELLA: No abnormality accounting for technique.    OSSEOUS STRUCTURES/SOFT TISSUES: Normal marrow signal. The major intracranial vascular flow voids are maintained.     ORBITS: No abnormality accounting for technique.     SINUSES/MASTOIDS: Mild to moderate mucosal thickening scattered about the paranasal sinuses. No middle ear or mastoid effusion.       Impression    IMPRESSION:  1.  Punctate acute/subacute infarct in the left superior cerebellar peduncle. No associated hemorrhagic conversion.  2.  Mild to moderate presumed chronic microvascular ischemic changes, as above.  3.  Mild to moderate scattered paranasal sinus mucosal thickening.                  MRA Neck (Carotids) wo & w Contrast    Addendum: 1/22/2024    Addended findings discussed with Dr. Rangel at 8:53 AM on 01/22/2024.      Addendum: 1/22/2024    The V1 segment of the right vertebral artery is obscured due to patient motion on the time-of-flight and postcontrast sequences. On the postcontrast sequences there is loss of flow-related signal involving the right V1 segment of the vertebral artery   which could be related to artifact or a filling defect/thrombus. Consider repeat CTA neck as clinically indicated. Beyond this irregularity, the right vertebral artery is patent without significant stenosis or dissection.            Narrative     EXAM: MRA NECK (CAROTIDS) W/O and W CONTRAST  LOCATION: [facility]  DATE: 1/22/2024    INDICATION: Dizziness. Dysarthria. Altered mental status.  COMPARISON: None.  CONTRAST: 10 mlGadavist   TECHNIQUE: Neck MRA without and with IV contrast. Stenosis measurements made according to NASCET criteria unless otherwise specified.    FINDINGS:  RIGHT CAROTID: No significant stenosis or dissection.    LEFT CAROTID: No significant stenosis or dissection.    VERTEBRAL ARTERIES: No focal stenosis or dissection. Dominant left and smaller right vertebral arteries.    AORTIC ARCH: Classic aortic arch anatomy with no significant stenosis at the origin of the great vessels.      Impression    IMPRESSION:  1.  No significant stenosis or dissection.   CT Chest/Abdomen/Pelvis w Contrast    Narrative    EXAM: CT CHEST/ABDOMEN/PELVIS W CONTRAST  LOCATION: [facility]  DATE: 1/22/2024    INDICATION: stroke despite anticoagulation, recent lmphoma   evaluate for malignancy  COMPARISON: CT abdomen and pelvis 09/15/2014  TECHNIQUE: CT scan of the chest, abdomen, and pelvis was performed following injection of IV contrast. Multiplanar reformats were obtained. Dose reduction techniques were used.   CONTRAST: 100mL Isovue 370    FINDINGS:   LUNGS AND PLEURA: Mild scattered emphysematous changes both lungs. Subpleural blebs and scarring left lung apex. No concerning nodules or masses. No infiltrates or pleural effusions.    MEDIASTINUM/AXILLAE: No enlarged mediastinal, hilar, or axillary nodes. Mild atherosclerotic disease thoracic aorta.    CORONARY ARTERY CALCIFICATION: Minimal    HEPATOBILIARY: Hepatomegaly measuring 20 cm in length. Diffuse fatty infiltration of the liver.    PANCREAS: Normal.    SPLEEN: Normal.    ADRENAL GLANDS: Normal.    KIDNEYS/BLADDER: Small cysts both kidneys. No stones or hydronephrosis.    BOWEL: Gastric bypass. No evidence for bowel obstruction. No bowel wall thickening  or inflammatory changes. Normal appendix.    LYMPH NODES: Normal. No evidence for adenopathy within the abdomen or pelvis.    VASCULATURE: Unremarkable.    PELVIC ORGANS: Hysterectomy. No free pelvic fluid.    MUSCULOSKELETAL: Mild broad-based fat-containing ventral wall hernia. Hypertrophic changes thoracic spine. No concerning skeletal lesions.      Impression    IMPRESSION:  1.  No evidence for adenopathy within the chest, abdomen or pelvis.  2.  Mild emphysema. No acute pulmonary disease.  3.  Fatty liver.  4.  Gastric bypass.   Echocardiogram Complete     Value    LVEF  55-60%    [facility]    [id number]  [id number]  [id number]  [id number]^ELAH^SHAWN^LUPE     [facility]  Echocardiography Laboratory  [address] [place] [address]     Name: MOLLY WALTER  MRN: [id number]  : 1965  Study Date: 2024 10:21 AM  Age: 58 yrs  Gender: Female  Patient Location: [facility]  Reason For Study: CVA  Ordering Physician: SHAWN LYNN     BSA: 2.3 m2  Height: 67 in  Weight: 270 lb  HR: 70  BP: 112/77 mmHg  ______________________________________________________________________________  Procedure  Complete Portable Bubble Echo Adult. Optison (NDC #9083-3526) given  intravenously.  ______________________________________________________________________________  Interpretation Summary     Limited quality echo due to patient body habitus. This will adversely affect  interpretation  There is no thrombus seen in the left ventricle.  RV not well seen, grossly normal  A contrast injection (Bubble Study) was performed that was negative for flow  across the interatrial septum.  No obvious cardiac source of emboli was seen within the limits of a  transthoracic echocardiogram.  ______________________________________________________________________________  Left Ventricle  The left ventricle is normal in size. There is normal left ventricular wall  thickness. The visual ejection fraction is 55-60%. Left  ventricular diastolic  function is normal. Normal left ventricular wall motion. There is no thrombus  seen in the left ventricle.     Right Ventricle  RV not well seen, grossly normal.     Atria  Normal left atrial size. Right atrial size is normal. A contrast injection  (Bubble Study) was performed that was negative for flow across the interatrial  septum.     Mitral Valve  The mitral valve leaflets appear normal. There is no evidence of stenosis,  fluttering, or prolapse.     Tricuspid Valve  Normal tricuspid valve. There is trace tricuspid regurgitation. Doppler  findings do not suggest pulmonary hypertension.     Aortic Valve  Normal tricuspid aortic valve.     Pulmonic Valve  The pulmonic valve is not well seen, but is grossly normal.     Vessels  The aortic root is normal size.     Pericardium  The pericardium appears normal.     Rhythm  Sinus rhythm was noted.  ______________________________________________________________________________  MMode/2D Measurements & Calculations  IVSd: 0.96 cm  LVIDd: 4.2 cm  LVIDs: 2.7 cm  LVPWd: 1.1 cm  IVC diam: 1.9 cm  FS: 34.2 %  LV mass(C)d: 140.6 grams  LV mass(C)dI: 61.2 grams/m2  Ao root diam: 3.1 cm  asc Aorta Diam: 3.3 cm  Ao root diam index Ht(cm/m): 1.8  Ao root diam index BSA (cm/m2): 1.4  Asc Ao diam index BSA (cm/m2): 1.4  Asc Ao diam index Ht(cm/m): 1.9  LA Volume (BP): 51.9 ml     LA Volume Index (BP): 22.6 ml/m2  RV Base: 3.1 cm  RWT: 0.53  TAPSE: 1.5 cm     Doppler Measurements & Calculations  MV E max phi: 61.3 cm/sec  MV A max phi: 63.9 cm/sec  MV E/A: 0.96  MV max P.9 mmHg  MV mean P.95 mmHg  MV V2 VTI: 24.2 cm  MV dec time: 0.21 sec  TR max phi: 158.0 cm/sec  TR max PG: 10.0 mmHg  E/E' av.7  Lateral E/e': 4.6  Medial E/e': 6.7  RV S Phi: 12.6 cm/sec     ______________________________________________________________________________  Report approved by: Jeannine Tejeda 2024 01:12 PM             Discharge Medications   Discharge  Medication List as of 1/23/2024  1:05 PM        CONTINUE these medications which have CHANGED    Details   acetaminophen (TYLENOL) 500 MG tablet Take 2 tablets (1,000 mg) by mouth every 6 hours as needed for pain, Disp-100 tablet, R-1, E-PrescribeNo more than 4 grams of acetaminophen (Tylenol) from all sources daily.      cyclobenzaprine (FLEXERIL) 10 MG tablet Take 1 tablet (10 mg) by mouth 3 times daily as needed for muscle spasms, Disp-45 tablet, R-1, E-Prescribe      entecavir (BARACLUDE) 0.5 MG tablet Take 1 tablet (0.5 mg) by mouth daily, No Print OutTalk to your oncologist at [place] about coming off this medication vs staying on it. Indication:  Hepatitis B (you are not aware of a prior diagnosis of hepatitis B but your labs suggest otherwise).      insulin glargine (LANTUS PEN) 100 UNIT/ML pen Inject 6 Units Subcutaneous at bedtime, Disp-15 mL, R-0, E-PrescribeIf Lantus is not covered by insurance, may substitute Basaglar or Semglee or other insulin glargine product per insurance preference at same dose and frequency.             CONTINUE these medications which have NOT CHANGED    Details   ACCU-CHEK GUIDE test strip SHO, Historical      apixaban ANTICOAGULANT (ELIQUIS) 5 MG tablet Take 5 mg by mouth 2 times daily, Historical      blood glucose monitoring (ACCU-CHEK FASTCLIX) lancets 2 times daily, Historical      docusate sodium (COLACE) 100 MG capsule Take 100 mg by mouth 2 times daily, Historical      fentaNYL (DURAGESIC) 25 mcg/hr 72 hr patch Place 1 patch onto the skin every 72 hours remove old patch., Historical      fluticasone (FLONASE) 50 MCG/ACT nasal spray Spray 2 sprays into both nostrils daily, Historical      hydrOXYzine HCl (ATARAX) 25 MG tablet Take 1-2 tablets by mouth every 6 hours as needed for anxiety, Historical      loratadine (CLARITIN) 10 MG tablet Take 10 mg by mouth daily, Historical      melatonin 3 MG tablet Take 3 mg by mouth at bedtime, Historical      oxyCODONE (ROXICODONE) 5 MG  tablet Take 5-10 mg by mouth every 4 hours as needed for severe pain, Historical      Pregabalin (LYRICA) 200 MG capsule Take 200 mg by mouth 2 times daily, Historical      Prenatal 27-1 MG TABS Take 1 tablet by mouth daily, Historical      Semaglutide, 2 MG/DOSE, (OZEMPIC, 2 MG/DOSE,) 8 MG/3ML pen Inject 2 mg Subcutaneous every 7 days, Historical      sertraline (ZOLOFT) 100 MG tablet Take 200 mg by mouth daily, Historical      traZODone (DESYREL) 50 MG tablet Take 50 mg by mouth nightly as needed for sleep, Historical      vitamin D2 (ERGOCALCIFEROL) 82985 units (1250 mcg) capsule Take 50,000 Units by mouth once a week, Historical      doxycycline hyclate (VIBRAMYCIN) 100 MG capsule Take 100 mg by mouth 2 times daily, Historical           STOP taking these medications       hydrochlorothiazide (HYDRODIURIL) 12.5 MG tablet Comments:   Reason for Stopping:         naproxen (NAPROSYN) 500 MG tablet Comments:   Reason for Stopping:             Allergies   Allergies   Allergen Reactions    Codeine Sulfate     Penicillins

## 2024-01-23 NOTE — PLAN OF CARE
Goal Outcome Evaluation:      Plan of Care Reviewed With: patient    Overall Patient Progress: improvingOverall Patient Progress: improving    PRIMARY DIAGNOSIS: COVID-19, BACTERIURIA      OUTPATIENT/OBSERVATION GOALS TO BE MET BEFORE DISCHARGE  1. Orthostatic performed: No    2. Tolerating PO medications: Yes Given Tylenol and Oxy for pain.     3. Return to near baseline physical activity: Yes up ad jeronimo in the room    4. Cleared for discharge by consultants (if involved): Possible discharge today once final recommendation from neuro done.     Discharge Planner Nurse   Safe discharge environment identified: Yes  Barriers to discharge: No       Entered by: Libby Carroll RN 01/23/2024 3:08 AM   Aox4. Reports of headache, given Tylenol and Oxy with relief. Denies sob and nausea. Neuro intact. On remote tele SR 64. Fentanyl patch in place. RA. SL. UP ad jeronimo. Refused bed alarm. Continue to monitor.   Please review provider order for any additional goals.   Nurse to notify provider when observation goals have been met and patient is ready for discharge.

## 2024-01-23 NOTE — DISCHARGE INSTRUCTIONS
If you or a loved one is in a life-threatening situation, please call 911 or go to the nearest emergency room. In non-emergency situations, you can call the following alcohol hotlines for information and support:    Aftercare Plan     [facility] Phone (free remote counseling): [phone]. Web address:   [email or web address]      If I am feeling unsafe or I am in a crisis, I will:   Contact my established care providers   Call the National Suicide Prevention Lifeline: 717.786.5303   Go to the nearest emergency room   Call 911      Warning signs that I or other people might notice when a crisis is developing for me:     I am having increasing suicidal thoughts that turn to plans with intent or means  I am having additional urges to self-harm    My emotions are of hopelessness; feeling like there's no way out.  Rage or anger.  Engaging in risky activities without thinking  Withdrawing from family/friends  Dramatic mood swings  Drastic personality changes   Use of alcohol or drugs  Postings on social media  Neglect of personal hygiene or cares      Things I am able to do on my own to cope or help me feel better:    Spending quality time with loved ones  Staying hydrated  Eating balanced meals  Going for a walk every day  Take care of daily responsibilities/needs  Focus on positive self-talk vs negative self-talk     Things that I am able to do with others to cope or help me better:   Exercise  Music  Deep breathing  Meditations  Journal  Self-regulate  Self check-in  Ask for help     Things I can use or do for distraction:   Reach out to/spend time with family, friends  Shower  Exercise  Chores or do a project  Listen to music  Watch movie/TV  Listening to music  Journaling  Reading a book  Meditating  Call a friend     Changes I can make to support my mental health and wellness:    -I will abstain from all mood altering chemicals not currently prescribed to me   -I will attend scheduled mental health  therapy and psychiatric appointments and follow all   recommendations  -I will commit to 30 minutes of self care daily - this can be as simple as taking a shower, going for a   walk, cooking a meal, read, writing, etc  -I will practice square breathing when I begin to feel anxious - in breath through the nose for the count   of 4 and the first line on the square. Out breath through the mouth for the count of 4 for the second line   of the square. Repeat to complete the square. Repeat the square as many times as needed.  - I will use distraction skills of: going for walks, watching TV, spending time outside, calling a friend or   family member  -Use community resources, including hotline numbers, [facility] crisis and support meetings  -Maintain a daily schedule/routine  -Practice deep breathing skills  -Download a meditation liza and spend 15-20 minutes per day mediating/relaxing. Some apps to   download include: Calm, Headspace and Insight Timer. All 3 of these apps have free version     Reduce Extreme Emotion  QUICKLY:  Changing Your Body Chemistry      T:  Change your body Temperature to change your autonomic nervous system   Use Ice Water to calm yourself down FAST   Put your face in a bowl of ice water (this is the best way; have the person keep his/her face in ice water for 30-45 seconds - initial research is showing that the longer s/he can hold her/his face in the water, the better the response), or   Splash ice water on your face, or hold an ice pack on your face      I:  Intensely exercise to calm down a body revved up by emotion   Examples: running, walking fast, jumping, playing basketball, weight lifting, swimming, calisthenics, etc.   Engage in exercises that DO NOT include violent behaviors. Exercises that utilize violent behaviors tend to function as  behavioral rehearsal,  and rather than calming the person down, may actually  rev  the person up more, increasing the likelihood of violence, and lessening  the likelihood that they will  burn off  energy     P:  Progressively relax your muscles   Starting with your hands, moving to your forearms, upper arms, shoulders, neck, forehead, eyes, cheeks and lips, tongue and teeth, chest, upper back, stomach, buttocks, thighs, calves, ankles, feet   Tense (10 seconds,   of the way), then relax each muscle (all the way)   Notice the tension   Notice the difference when relaxed (by tensing first, and then relaxing, you are able to get a more thorough relaxation than by simply relaxing)      P: Paced breathing to relax   The standard technique is to begin with counting the number of steps one takes for a typical inhale, then counting the steps one takes for a typical exhale, and then lengthening the amount of steps for the exhalation by one or two steps.  OR  Repeat this pattern for 1-2 minutes  Inhale for four (4) seconds   Exhale for six (6) to eight (8) seconds   Research demonstrated that one can change one's overall level of anxiety by doing this exercise for even a few minutes per day       People in my life that I can ask for help:   Family  Friends  Providers     Your [facility] has a mental health crisis team you can call 24/7:   [facility] Crisis Line Number: [phone]  [facility] Mental Health Crisis: [phone] - Call the crisis line for immediate mental health support, 24 hours a day.   [facility] Crisis Line Number: [phone]  [facility] Crisis Line Number: [phone]  [facility] Crisis Line Number: [phone]   [facility] Crisis Line Number: [phone]  [place]: [phone]  [place]: [phone]  [facility] Crisis Number: [phone]  [facility] Crisis: [phone]     Other things that are important when I'm in crisis:   Ask for help     Additional resources and information:      Mental Health Apps  My3  https://myGeoVS.org/     DescribeMeeBox   "[email or web address]/virtual-hope-box/        Professionals or Agencies I Can Contact During A Crisis:        Crisis Lines  Call or Text 807 - National Suicide and Crisis Lifeline     Crisis Text Line  Text 067146  You will be connected with a trained live crisis counselor to provide support.     The [place] Project (LGBTQ Youth Crisis Line)  2.648.655.2517  text START to 633-948     National [place] on Mental Illness (ELDA)  570.665.5555 or 5.162.ELDA.HELPS     National Suicide Prevention Lifeline at 7-561-981-HFDM (2697)      Throughout  [place]: call **CRISIS (**663621)      Crisis Text Line: is available for free, 24/7 by texting [place] to 574101     [employer]  Fast Tracker  Linking people to mental health and substance use disorder resources  [employer].org      [place] Mental Health Warm Line  Peer to peer support  Monday thru Saturday, 12 pm to 10 pm  647.773.2197 or 1.904.827.1874  Text \"Support\" to 68978     National [place] on Mental Illness (www.[place].elda.org): 848.772.3159 or 997-436-5550     [facility] Phone (free remote counseling): 636.944.6131 Web address:   https://daysoft.org/      www.Xplenty (filter for insurance, gender preference, etc.)     [facility]   [phone]  Intake appointment will be virtual, following appointments can be in person or virtual.   **IMMEDIATE OPENINGS**     [facility]  [phone]  *offers individual therapy, medication management and Mental Health Case Workers; can self refer     [facility]  [phone]  *Immediate Openings     [facility]  [phone]  *Immediate Openings     [facility]  [phone]  *Immediate Openings     Please follow up with scheduled providers to ensure all necessary paperwork is filled out prior to your   scheduled telehealth appointments.      Coordinators from [facility] will be " calling within two business days to ensure   that you have the resources you may need or provide assistance with scheduling (Phone number: [phone].).     Remember: give the referrals 3 sessions prior to calling it quits. Do you trust them? Do you feel   understood? Do you think they can help? Check in with yourself after each session       [place] [facility]: (511) 815-6066?    The [place] [facility] is a 24/7 alcohol helpline where you can get information about alcohol use disorder and alcohol rehab. This hotline operates around the clock, but your call may occasionally go unanswered due to high call volumes or staff shortage. Please call back another time or call one of the other hotlines listed below.    Alcoholics Anonymous 1-546.766.7614    Find out more about the AA program or fellowship by calling the Alcoholics Anonymous number.    Alcohol Hotline for IAFF Members 1-628.408.3121    The alcohol hotline is in place to support firefighters and paramedics who are having issues with alcohol. It is for IAFF members and their family and friends.    [facility]: 0-500-668-HELP (4545)    The [facility] (Substance Abuse and Mental Health Services Administration) helpline is a U.S. government initiative. This hotline gives you access to nationwide resources for alcohol use disorder treatment, including information and referrals to rehabs near you. The [facility] helpline is available 24/7 and offers services in both English and Russian.    National Suicide Prevention Lifeline: 1-113-192-TALK (3074)    This is a national helpline for people in emotional distress with suicidal thoughts. Calls to the National Suicide Prevention Lifeline are free and confidential. The hotline operates 24/7.    National Poison Control: 1-107.206.9018    The U.S. Poison Control helpline provides information about the prevention and treatment of drug overdoses, including alcohol poisoning.

## 2024-01-23 NOTE — PROGRESS NOTES
Care Management Discharge Note    Discharge Date: 01/23/2024       Discharge Disposition: Home    Discharge Transportation: family or friend will provide    Private pay costs discussed: Not applicable    Does the patient's insurance plan have a 3 day qualifying hospital stay waiver?  No    Education Provided on the Discharge Plan:  Yes  Persons Notified of Discharge Plans: Pt  Patient/Family in Agreement with the Plan: yes    Handoff Referral Completed: No    Additional Information:  Pt will be discharging home today.  Your hospital follow up appointment has been scheduled for you with Lillian Tijerina at [facility] for 1/31/2024 at 10:45. Please bring your hospital discharge instructions, a photo ID, and insurance information with you to your appointment. Please call the clinic at [phone] if you need to reschedule.    Pt denies having any discharge needs.  Please call if discharge needs arise.    Susana Killian RN   Inpatient Care Coordination  [facility]   Phone: [phone]

## 2024-01-23 NOTE — PROGRESS NOTES
"PRIMARY DIAGNOSIS: COVID-19, BACTERIURIA  OUTPATIENT/OBSERVATION GOALS TO BE MET BEFORE DISCHARGE:  ADLs back to baseline: No    Activity and level of assistance:independent her room refused bed alarm.    Pain status: Pain free.    Return to near baseline physical activity: No     Discharge Planner Nurse   Safe discharge environment identified: Yes  Barriers to discharge: Yes       Entered by: Serafin Mcmillan RN 01/22/2024 11:33 PM   /74 (BP Location: Left arm)   Pulse 67   Temp 97.8  F (36.6  C) (Axillary)   Resp 18   Ht 1.702 m (5' 7\")   Wt 122.5 kg (270 lb 1 oz)   SpO2 98%   BMI 42.30 kg/m     Please review provider order for any additional goals.   Nurse to notify provider when observation goals have been met and patient is ready for discharge.      Pt alert and oriented x4.vss on RA.Per tele reading NSR 76.regular diet tolerated well.Neuro check completed , Neuro intact.AC/HS BG ,  "

## 2024-01-23 NOTE — PROVIDER NOTIFICATION
4:58 PM  Provider: Sapna  Message: Peripheral BCx drawn 2026 1/21 positive for Gram positive Cocci in Clusters  Response: Run Verigene

## 2024-01-23 NOTE — PROGRESS NOTES
"PRIMARY DIAGNOSIS: COVID-19, BACTERIURIA  OUTPATIENT/OBSERVATION GOALS TO BE MET BEFORE DISCHARGE:  ADLs back to baseline: No    Activity and level of assistance: Up with standby assistance.    Pain status: Pain free.    Return to near baseline physical activity: No     Discharge Planner Nurse   Safe discharge environment identified: Yes  Barriers to discharge: Yes       Entered by: Serafin Mcmillan RN 01/22/2024 8:24 PM   /74 (BP Location: Left arm)   Pulse 67   Temp 97.8  F (36.6  C) (Axillary)   Resp 18   Ht 1.702 m (5' 7\")   Wt 122.5 kg (270 lb 1 oz)   SpO2 98%   BMI 42.30 kg/m     Please review provider order for any additional goals.   Nurse to notify provider when observation goals have been met and patient is ready for discharge.  "

## 2024-01-23 NOTE — PLAN OF CARE
"Dx: Bacteriuria, COVID    /74 (BP Location: Left arm)   Pulse 60   Temp 98.7  F (37.1  C) (Oral)   Resp 18   Ht 1.702 m (5' 7\")   Wt 122.5 kg (270 lb 1 oz)   SpO2 99%   BMI 42.30 kg/m       A&O x4. Independent in room. Regular diet. Reporting pain 6/10- wants tylenol, provider informed. BS 84. Tele sinus hamilton @ 56. Bed alarm off- patient declined. Call light within reach.   "

## 2024-01-23 NOTE — PLAN OF CARE
"Dx: Bacteriuria, COVID    /86 (BP Location: Right arm)   Pulse 70   Temp 98.7  F (37.1  C) (Oral)   Resp 18   Ht 1.702 m (5' 7\")   Wt 122.5 kg (270 lb 1 oz)   SpO2 97%   BMI 42.30 kg/m       A&O x4. Independent in room. Regular diet tolerated. . Tele removed by patient. Bed alarm off- patient declined. Call light within reach. Pending ride for discharge.   "

## 2024-01-23 NOTE — PLAN OF CARE
Goal Outcome Evaluation:      Plan of Care Reviewed With: patient    Overall Patient Progress: improvingOverall Patient Progress: improving       PRIMARY DIAGNOSIS: COVID-19, BACTERIURIA     OUTPATIENT/OBSERVATION GOALS TO BE MET BEFORE DISCHARGE  1. Orthostatic performed: No    2. Tolerating PO medications: Yes    3. Return to near baseline physical activity: Yes    4. Cleared for discharge by consultants (if involved): No    Discharge Planner Nurse   Safe discharge environment identified: No  Barriers to discharge: Yes       Entered by: Libby Carroll RN 01/23/2024 1:08 AM     Please review provider order for any additional goals.   Nurse to notify provider when observation goals have been met and patient is ready for discharge.

## 2024-01-23 NOTE — PROGRESS NOTES
Brief Stroke Note:     No acute events overnight. CT CAP completed without evidence of malignancy.     Stroke Evaluation Summarized     MRI/Head CT Punctate acute/subacute infarct in the left superior cerebellar peduncle    Intracranial Vasculature CTA: Left cavernous ICA and left carotid siphon mild stenosis, bilateral distal V4 moderate stenosis, left supraclinoid ICA 2 mm infundibulum versus aneurysm   Cervical Vasculature MRA: Motion degradation of the right V1 segment, on postcontrast images there is loss of flow related signal in the right V1  CTA: Right V1 segment with appearance of clot/thrombus, however favored to be artifactual      Echocardiogram EF 55-60%, normal left atrium, negative bubble   EKG/Telemetry Sinus bradycardia   Other Testing CT CAP: no evidence of malignancy      LDL  1/22/2024: 84 mg/dL   A1C  1/21/2024: 7.5 %   Troponin 1/22/2024: 7 ng/L         Impression  Acute ischemic stroke of left superior cerebellar peduncle due to large-artery atherosclerosis. She has moderate bilateral V4 stenosis and possible R vertebral artery occlusion. She is on chronic anticoagulation for history of pulmonary embolism, but treatment was interrupted due to planned nerve block.      Recommendations   Secondary Stroke Prevention  - Continue Eliquis 5 mg BID, minimize interruptions  - LDL 84. Goal LDL 40-70. She is agreeable to trial statin, recommend Lipitor 40 mg daily.   - A1c 7.5 (improved from 9.3 in September), goal <7.0  - Avoid hypotension. Long term goal BP <130/80 with tighter control associated with decreased overall CV risk, if tolerated. Discussed the importance of home BP monitoring and keeping a log for PCP.  - PT/OT/SLP  - Stroke education. Discussed stroke warning signs (BE FAST) and need for emergent presentation if symptoms occur  - Mediterranean diet  - Tobacco screen: prior smoker   - EDIN screen: negative screen.      Follow-Up:  - 1-2 weeks with PCP   - 6-8 weeks with stroke BECCA (order  "placed)    Thank you for this consult. No further stroke evaluation is indicated, we will sign off. Please contact us with additional questions.     OLGA Pena, CNP  Neurology  01/23/2024 11:27 AM  To page stroke neurology after hours or on a subsequent day, click here: [facility]COM  Choose \"On Call\" tab at top, then search dropdown box for \"Neurology Adult\" & press Enter, look for Neuro ICU/Stroke              "

## 2024-01-24 ENCOUNTER — TELEPHONE (OUTPATIENT)
Dept: FAMILY MEDICINE | Facility: CLINIC | Age: 59
End: 2024-01-24
Payer: COMMERCIAL

## 2024-01-24 DIAGNOSIS — I63.9 CEREBROVASCULAR ACCIDENT (CVA), UNSPECIFIED MECHANISM (H): Primary | ICD-10-CM

## 2024-01-24 RX ORDER — ATORVASTATIN CALCIUM 40 MG/1
40 TABLET, FILM COATED ORAL DAILY
Qty: 30 TABLET | Status: SHIPPED | OUTPATIENT
Start: 2024-01-24

## 2024-01-24 NOTE — TELEPHONE ENCOUNTER
Rx: Atorvastatin   Atorvastatin 40 mg PO daily #30 R-PRN  Indication: HLD control in the setting of recent CVA.    Follow up:  BC 1 of 2 from 1/21/2024 + gram + cocci in clusters.  Verigene returning for staph species.     Likely contaminant.  Admitted for CVA and asymptomatic COVID +.      BC pending at time of discharge.      UC 50-100K E.coli. Asymptomatic. Received CTX in the ED.  Given absence of symptoms elected to not treat and instead monitor.     OLGA Mcelroy CNP

## 2024-01-24 NOTE — PROVIDER NOTIFICATION
7:59 PM  Provider: GAL  Message: Pt discharged by Ulysses earlier today has peripheral Bx from 1/21 2026. 1st Positive for Gram + Cocci in Clusters - notified Ulysses and he ordered verigene. Now lab says it is positive for a Staph Species (undetermined). Thank you.

## 2024-01-25 ENCOUNTER — PATIENT OUTREACH (OUTPATIENT)
Dept: CARE COORDINATION | Facility: CLINIC | Age: 59
End: 2024-01-25
Payer: COMMERCIAL

## 2024-01-25 LAB
BACTERIA BLD CULT: ABNORMAL
BACTERIA BLD CULT: ABNORMAL

## 2024-01-25 NOTE — PROGRESS NOTES
Connected Care Resource Center:   [facility] Contact  [facility]/Voicemail     Clinical Data: Post-Discharge Outreach     Outreach attempted x 2.  Left message on patient's voicemail, providing [facility]'s central phone number of [phone] ([phone]) for questions/concerns and/or to schedule an appt with an [facility] provider, if they do not have a PCP.      Plan:  [facility] will do no further outreaches at this time.       BURT Mai  Connected Care Resource [place], [facility]    *Connected Care Resource Team does NOT follow patient ongoing. Referrals are identified based on internal discharge reports and the outreach is to ensure patient has an understanding of their discharge instructions.

## 2024-01-27 LAB — BACTERIA BLD CULT: NO GROWTH

## 2024-02-01 ENCOUNTER — OFFICE VISIT (OUTPATIENT)
Dept: INTERNAL MEDICINE | Facility: CLINIC | Age: 59
End: 2024-02-01
Payer: COMMERCIAL

## 2024-02-01 VITALS
DIASTOLIC BLOOD PRESSURE: 80 MMHG | HEIGHT: 67 IN | OXYGEN SATURATION: 97 % | WEIGHT: 272 LBS | BODY MASS INDEX: 42.69 KG/M2 | RESPIRATION RATE: 16 BRPM | SYSTOLIC BLOOD PRESSURE: 110 MMHG | HEART RATE: 80 BPM | TEMPERATURE: 97.2 F

## 2024-02-01 DIAGNOSIS — Z87.440 PERSONAL HISTORY OF URINARY TRACT INFECTION: Primary | ICD-10-CM

## 2024-02-01 LAB
ALBUMIN UR-MCNC: NEGATIVE MG/DL
APPEARANCE UR: CLEAR
BILIRUB UR QL STRIP: NEGATIVE
COLOR UR AUTO: YELLOW
GLUCOSE UR STRIP-MCNC: NEGATIVE MG/DL
HGB UR QL STRIP: NEGATIVE
KETONES UR STRIP-MCNC: NEGATIVE MG/DL
LEUKOCYTE ESTERASE UR QL STRIP: NEGATIVE
NITRATE UR QL: NEGATIVE
PH UR STRIP: 6 [PH] (ref 5–7)
SP GR UR STRIP: 1.02 (ref 1–1.03)
UROBILINOGEN UR STRIP-ACNC: 0.2 E.U./DL

## 2024-02-01 PROCEDURE — 99214 OFFICE O/P EST MOD 30 MIN: CPT

## 2024-02-01 PROCEDURE — 81003 URINALYSIS AUTO W/O SCOPE: CPT

## 2024-02-01 ASSESSMENT — PATIENT HEALTH QUESTIONNAIRE - PHQ9
SUM OF ALL RESPONSES TO PHQ QUESTIONS 1-9: 11
10. IF YOU CHECKED OFF ANY PROBLEMS, HOW DIFFICULT HAVE THESE PROBLEMS MADE IT FOR YOU TO DO YOUR WORK, TAKE CARE OF THINGS AT HOME, OR GET ALONG WITH OTHER PEOPLE: VERY DIFFICULT
SUM OF ALL RESPONSES TO PHQ QUESTIONS 1-9: 11

## 2024-02-01 NOTE — PROGRESS NOTES
"  Assessment & Plan     (Z87.440) Personal history of urinary tract infection  (primary encounter diagnosis)  Comment: Patient was just in the hospital with a urinary tract infection, potentially COVID hypotension, acute kidney injury and leukocytosis.  Patient has since been discharged home and reports feeling well.  Patient finished her antibiotic course completely and is here to retest her urine for UTI.  Will order repeat UA today test for cure.  Plan: UA Macroscopic with reflex to Microscopic and         Culture - Clinic Collect        Urinalysis normal, no acute infection present.  No antibiotics warranted at this time.  Patient did not have any further questions and feels good at home.  Patient will establish care with a primary care provider within this clinic.  Orders placed.      30 minutes spent by me on the date of the encounter doing chart review, review of test results, interpretation of tests, patient visit, and documentation     MED REC REQUIRED  Post Medication Reconciliation Status:     BMI  Estimated body mass index is 42.6 kg/m  as calculated from the following:    Height as of this encounter: 1.702 m (5' 7\").    Weight as of this encounter: 123.4 kg (272 lb).   Weight management plan: Patient was referred to their PCP to discuss a diet and exercise plan.    Depression Screening Follow Up        2/1/2024     9:47 AM   PHQ   PHQ-9 Total Score 11   Q9: Thoughts of better off dead/self-harm past 2 weeks Not at all         2/1/2024     9:47 AM   Last PHQ-9   1.  Little interest or pleasure in doing things 2   2.  Feeling down, depressed, or hopeless 1   3.  Trouble falling or staying asleep, or sleeping too much 1   4.  Feeling tired or having little energy 2   5.  Poor appetite or overeating 2   6.  Feeling bad about yourself 1   7.  Trouble concentrating 2   8.  Moving slowly or restless 0   Q9: Thoughts of better off dead/self-harm past 2 weeks 0   PHQ-9 Total Score 11       Follow Up Actions " Taken  Crisis resource information provided in After Visit Summary  Referred patient back to PCP           Subjective   Shana is a 58 year old, presenting for the following health issues:   admitted on 1/21/2024. She has a history of B-cell lymphoma in remission, hypertension, DM2, chronic anemia, recurrent PE on DOAC who presents to the ED for evaluation of altered mental status, weakness, nausea/vomiting.  Reportedly woke up in her normal state the morning of 1/21, took pain medications prior to attending a baby shower, afterwards was extremely weak and altered.  Very different from baseline per her family.       Initial evaluation in the ED pt hypotensive initially in the 80s/40s and remained soft, HR 60s-70s, SpO2 mid 90s on room air. Evaluated for stroke given presenting symptoms, CT imaging was essentially negative apart from one vascular defect that was presumed to be artifact, although neurology consulted and recommended additional MRI for further evaluation. Remainder of workup revealed UA positive for nitrites, leukocyte esterase, WBC, and hyaline casts. Positive COVID-19 testing. BMP significant for elevated urea nitrogen-21.4, creatinine-1.22, CFR-52, and glucose- 146. CBS shows reduced RBC-3.43, hemoglobin 10.1, hematocrit 31.7 and absolute lymphocytes .4. She received narcan, ceftriaxone and 2 L NS. EKG without ischemic changes. Negative troponin.    Stroke neurology recommends holding hydrochlorothiazide until follow up with PCP in 1-2 weeks. Pt instructed to monitor and record BP at home and bring records to PCP visit.      Today: Antibiotics have been done and she is feeling much better. She is not having any urinary symptoms as of today but she felt like Monday and Tuesday she did- frequency. No burning. She is not taking her blood pressure medication (hydrochlorothiazide)     Hospital F/U      2/1/2024     9:54 AM   Additional Questions   Roomed by Devora DELGADILLO CMA     hospitals       Hospital Follow-up  "Visit:    Hospital/Nursing Home/IP Rehab Facility: [facility]  Date of Admission: 1/21/2024  Date of Discharge: 1/24/2024  Reason(s) for Admission: CVA    Was your hospitalization related to COVID-19? No   Problems taking medications regularly:  None  Medication changes since discharge: None  Problems adhering to non-medication therapy:  None    Summary of hospitalization:  [facility] discharge summary reviewed  Diagnostic Tests/Treatments reviewed.  Follow up needed: none  Other Healthcare Providers Involved in Patient s Care:         None  Update since discharge: improved.         Plan of care communicated with patient                 Review of Systems  Constitutional, HEENT, cardiovascular, pulmonary, gi and gu systems are negative, except as otherwise noted.      Objective    /80 (BP Location: Left arm, Patient Position: Sitting, Cuff Size: Adult Large)   Pulse 80   Temp 97.2  F (36.2  C) (Tympanic)   Resp 16   Ht 1.702 m (5' 7\")   Wt 123.4 kg (272 lb)   LMP  (LMP Unknown)   SpO2 97%   Breastfeeding No   BMI 42.60 kg/m    Body mass index is 42.6 kg/m .  Physical Exam   GENERAL: alert and no distress  EYES: Eyes grossly normal to inspection, PERRL and conjunctivae and sclerae normal  HENT: ear canals and TM's normal, nose and mouth without ulcers or lesions  NECK: no adenopathy, no asymmetry, masses, or scars  RESP: lungs clear to auscultation - no rales, rhonchi or wheezes  CV: regular rate and rhythm, normal S1 S2, no S3 or S4, no murmur, click or rub, no peripheral edema  ABDOMEN: soft, nontender, no hepatosplenomegaly, no masses and bowel sounds normal  MS: no gross musculoskeletal defects noted, no edema            Signed Electronically by: OLGA Gann CNP    Answers submitted by the patient for this visit:  Patient Health Questionnaire (Submitted on 2/1/2024)  If you checked off any problems, how difficult have these problems made it for you to do " your work, take care of things at home, or get along with other people?: Very difficult  PHQ9 TOTAL SCORE: 11

## 2024-02-12 ENCOUNTER — HOSPITAL ENCOUNTER (EMERGENCY)
Facility: CLINIC | Age: 59
Discharge: HOME | End: 2024-02-12
Attending: SOCIAL WORKER | Admitting: SOCIAL WORKER
Payer: COMMERCIAL

## 2024-02-12 VITALS
BODY MASS INDEX: 41.91 KG/M2 | HEART RATE: 76 BPM | TEMPERATURE: 97.7 F | SYSTOLIC BLOOD PRESSURE: 152 MMHG | OXYGEN SATURATION: 100 % | DIASTOLIC BLOOD PRESSURE: 101 MMHG | WEIGHT: 267 LBS | HEIGHT: 67 IN | RESPIRATION RATE: 20 BRPM

## 2024-02-12 DIAGNOSIS — J01.11 ACUTE RECURRENT FRONTAL SINUSITIS: ICD-10-CM

## 2024-02-12 DIAGNOSIS — R09.81 CONGESTION OF PARANASAL SINUS: ICD-10-CM

## 2024-02-12 LAB
ANION GAP SERPL CALCULATED.3IONS-SCNC: 9 MMOL/L (ref 7–15)
BASOPHILS # BLD AUTO: 0 10E3/UL (ref 0–0.2)
BASOPHILS NFR BLD AUTO: 1 %
BUN SERPL-MCNC: 11.4 MG/DL (ref 6–20)
CALCIUM SERPL-MCNC: 9 MG/DL (ref 8.6–10)
CHLORIDE SERPL-SCNC: 103 MMOL/L (ref 98–107)
CREAT SERPL-MCNC: 1 MG/DL (ref 0.51–0.95)
DEPRECATED HCO3 PLAS-SCNC: 28 MMOL/L (ref 22–29)
EGFRCR SERPLBLD CKD-EPI 2021: 66 ML/MIN/1.73M2
EOSINOPHIL # BLD AUTO: 0.1 10E3/UL (ref 0–0.7)
EOSINOPHIL NFR BLD AUTO: 5 %
ERYTHROCYTE [DISTWIDTH] IN BLOOD BY AUTOMATED COUNT: 15.4 % (ref 10–15)
GLUCOSE SERPL-MCNC: 88 MG/DL (ref 70–99)
HCT VFR BLD AUTO: 32.1 % (ref 35–47)
HGB BLD-MCNC: 10.2 G/DL (ref 11.7–15.7)
HOLD SPECIMEN: NORMAL
HOLD SPECIMEN: NORMAL
IMM GRANULOCYTES # BLD: 0 10E3/UL
IMM GRANULOCYTES NFR BLD: 1 %
LYMPHOCYTES # BLD AUTO: 0.6 10E3/UL (ref 0.8–5.3)
LYMPHOCYTES NFR BLD AUTO: 31 %
MCH RBC QN AUTO: 29.7 PG (ref 26.5–33)
MCHC RBC AUTO-ENTMCNC: 31.8 G/DL (ref 31.5–36.5)
MCV RBC AUTO: 93 FL (ref 78–100)
MONOCYTES # BLD AUTO: 0.2 10E3/UL (ref 0–1.3)
MONOCYTES NFR BLD AUTO: 11 %
NEUTROPHILS # BLD AUTO: 1.1 10E3/UL (ref 1.6–8.3)
NEUTROPHILS NFR BLD AUTO: 51 %
NRBC # BLD AUTO: 0 10E3/UL
NRBC BLD AUTO-RTO: 0 /100
PLATELET # BLD AUTO: 177 10E3/UL (ref 150–450)
POTASSIUM SERPL-SCNC: 3.4 MMOL/L (ref 3.4–5.3)
RBC # BLD AUTO: 3.44 10E6/UL (ref 3.8–5.2)
SODIUM SERPL-SCNC: 140 MMOL/L (ref 135–145)
WBC # BLD AUTO: 2 10E3/UL (ref 4–11)

## 2024-02-12 PROCEDURE — 250N000013 HC RX MED GY IP 250 OP 250 PS 637: Performed by: SOCIAL WORKER

## 2024-02-12 PROCEDURE — 36415 COLL VENOUS BLD VENIPUNCTURE: CPT | Performed by: SOCIAL WORKER

## 2024-02-12 PROCEDURE — 96361 HYDRATE IV INFUSION ADD-ON: CPT

## 2024-02-12 PROCEDURE — 85025 COMPLETE CBC W/AUTO DIFF WBC: CPT | Performed by: SOCIAL WORKER

## 2024-02-12 PROCEDURE — 258N000003 HC RX IP 258 OP 636: Performed by: SOCIAL WORKER

## 2024-02-12 PROCEDURE — 80048 BASIC METABOLIC PNL TOTAL CA: CPT | Performed by: SOCIAL WORKER

## 2024-02-12 PROCEDURE — 250N000011 HC RX IP 250 OP 636: Performed by: SOCIAL WORKER

## 2024-02-12 PROCEDURE — 99284 EMERGENCY DEPT VISIT MOD MDM: CPT | Mod: 25

## 2024-02-12 PROCEDURE — 96374 THER/PROPH/DIAG INJ IV PUSH: CPT

## 2024-02-12 RX ORDER — GUAIFENESIN 600 MG/1
600 TABLET, EXTENDED RELEASE ORAL 2 TIMES DAILY PRN
Qty: 10 TABLET | Refills: 0 | Status: SHIPPED | OUTPATIENT
Start: 2024-02-12 | End: 2024-02-17

## 2024-02-12 RX ORDER — PROCHLORPERAZINE MALEATE 10 MG
5 TABLET ORAL EVERY 6 HOURS PRN
Qty: 10 TABLET | Refills: 0 | Status: SHIPPED | OUTPATIENT
Start: 2024-02-12 | End: 2024-02-17

## 2024-02-12 RX ORDER — ACETAMINOPHEN 500 MG
1000 TABLET ORAL ONCE
Status: COMPLETED | OUTPATIENT
Start: 2024-02-12 | End: 2024-02-12

## 2024-02-12 RX ADMIN — ACETAMINOPHEN 1000 MG: 500 TABLET ORAL at 14:12

## 2024-02-12 RX ADMIN — SODIUM CHLORIDE 1000 ML: 9 INJECTION, SOLUTION INTRAVENOUS at 14:29

## 2024-02-12 RX ADMIN — PROCHLORPERAZINE EDISYLATE 5 MG: 5 INJECTION INTRAMUSCULAR; INTRAVENOUS at 14:32

## 2024-02-12 ASSESSMENT — ACTIVITIES OF DAILY LIVING (ADL): ADLS_ACUITY_SCORE: 37

## 2024-02-12 NOTE — ED TRIAGE NOTES
Pt was treated for a sinus infection 1/16 with doxycycline. Reports no relief of symptoms. Went to clinic today and was sent to the ED to r/o stroke d/t ongoing headache. Pt denies numbess, tingling, weakness. Had a TIA end of January.    Triage Assessment (Adult)       Row Name 02/12/24 1130          Triage Assessment    Airway WDL WDL        Respiratory WDL    Respiratory WDL WDL        Skin Circulation/Temperature WDL    Skin Circulation/Temperature WDL WDL        Cardiac WDL    Cardiac WDL WDL        Peripheral/Neurovascular WDL    Peripheral Neurovascular WDL WDL        Cognitive/Neuro/Behavioral WDL    Cognitive/Neuro/Behavioral WDL WDL

## 2024-02-12 NOTE — DISCHARGE INSTRUCTIONS
You were seen emergency department for headache and sinus congestion.  Your exam was overall reassuring here we not see signs of acute emergency at this time.  We did talk about how your white blood cell count was little bit lower than it has been, please follow-up with your primary care doctor to have this rechecked.  At this time I do not see signs of an acute sinus infection, we discussed that if your symptoms have not improved by next week you can fill the antibiotics that I have prescribed however otherwise she would like to minimize antibiotics for use much as possible.  I am giving you the phone number for the ear nose and throat doctors, please give them a call to schedule a follow up appointment.  He is also follow-up with her primary care doctor.    At home you can try using a Franklin pot as well as steam to help loosen your sinuses.  I have also prescribed a decongestant as well as a medication for the headache that you can take.    If you start to have a sudden severe worsening of your headache, if you have pain with moving her eyes around, if you develop high fever, if you have neck stiffness, if you have vomiting cannot keep anything down, if you have vision changes, please come back to the emergency department.

## 2024-02-12 NOTE — ED PROVIDER NOTES
History     Chief Complaint:  Sinusitis     HPI   Shana Ventura is a 58 year old female with history of non Hodgkin's lymphoma, HTN, hyperlipidemia, type II diabetes mellitus, morbid obesity, fibromyalgia, and PE anticoagulated on Eliquis who presents to the ED with sinusitis and a headache. Patient states that she has had pressure behind her eyes and rhinorrhea for over three weeks. She was seen at urgent care for this concern on 1/16/24 where she was diagnosed with a sinus infection and sent home on a 10 day course of Doxycyline. This offered no significant relief for her symptoms. She was seen in urgent care again today and was sent to the ED for further evaluation of her ongoing headache due to her history of an acute ischemic stroke in January 2024. Patient states that her current symptoms feel different than the symptoms she had with her past stroke. She says she took Tylenol at 1100 today with some improvement in her headache, though the headache has now returned again. Denies recent fevers, vomiting, neck stiffness, ear pain, and syncopal episodes. Denies weakness anywhere. Patient takes insulin for her diabetes and says it is well controlled. She has not missed any doses of her Eliquis recently. She did not have any blood work performed while at urgent care today.     Independent Historian:    None - Patient Only    Review of External Notes:  Reviewed UC note from earlier in the day     Allergies:  Codeine Sulfate  Penicillins     Relevant Medical History:  Primary fibromyalgia   Morbid obesity   HTN  Type II diabetes mellitus   Arthritis   GERD  Non-Hodgkin's lymphoma   PE  COVID-19 infection   Acute encephalopathy   Bacteriuria   Adjustment disorder mixed reaction   Hidradenitis suppurativa   Hyperlipidemia   Migraines   Neoplasm of spinal cord   LGSIL on pap smear   Telangiectasia hereditary hemorrhagic   OA of right knee  Vitamin D deficiency   Acute ischemic stroke     Physical Exam   Patient Vitals  for the past 24 hrs:   BP Pulse Resp SpO2   02/12/24 1611 (!) 152/101 76 20 100 %        Physical Exam  General: Overall stable and nontoxic appearing  HEENT: Conjunctivae clear, no scleral icterus, mucous membranes moist, no pain with extraocular movements. TM clear. TTP over frontal and maxillary sinuses. Congestion present.   Neuro: PERRL, EOM intact without nystagmus  No facial droop, no slurred speech, sensation and strength of the face intact and equal  Strength and sensation intact and equal of the bilateral upper and lower extremities  No pronator drift  Gait intact without any ataxia  Finger roll normal  CV: Regular rate and rhythm, radial and DP pulses equal  Respiratory: No signs of respiratory distress, lungs clear to auscultation bilaterally   Abdomen: Soft, without rigidity or rebound throughout  MSK: No lower extremity swelling or tenderness    Emergency Department Course     Laboratory: Imaging:   Labs Ordered and Resulted from Time of ED Arrival to Time of ED Departure   BASIC METABOLIC PANEL - Abnormal       Result Value    Sodium 140      Potassium 3.4      Chloride 103      Carbon Dioxide (CO2) 28      Anion Gap 9      Urea Nitrogen 11.4      Creatinine 1.00 (*)     GFR Estimate 66      Calcium 9.0      Glucose 88     CBC WITH PLATELETS AND DIFFERENTIAL - Abnormal    WBC Count 2.0 (*)     RBC Count 3.44 (*)     Hemoglobin 10.2 (*)     Hematocrit 32.1 (*)     MCV 93      MCH 29.7      MCHC 31.8      RDW 15.4 (*)     Platelet Count 177      % Neutrophils 51      % Lymphocytes 31      % Monocytes 11      % Eosinophils 5      % Basophils 1      % Immature Granulocytes 1      NRBCs per 100 WBC 0      Absolute Neutrophils 1.1 (*)     Absolute Lymphocytes 0.6 (*)     Absolute Monocytes 0.2      Absolute Eosinophils 0.1      Absolute Basophils 0.0      Absolute Immature Granulocytes 0.0      Absolute NRBCs 0.0       No orders to display               Emergency Department Course & Assessments:        Interventions:  Medications   sodium chloride 0.9% BOLUS 1,000 mL (0 mLs Intravenous Stopped 24 1618)   prochlorperazine (COMPAZINE) injection 5 mg (5 mg Intravenous $Given 24 1432)   acetaminophen (TYLENOL) tablet 1,000 mg (1,000 mg Oral $Given 24 1412)        Assessments, Independent Interpretation, Consult/Discussion of Management and Tests:  ED Course as of 24 1231   Mon 2024   1410 I obtained history and examined the patient as noted above.   1510 WBC(!): 2.0   1612 Reassessed, patient reports her headache has improved      Social Determinants of Health affecting care:  None    Disposition:  The patient was discharged to home.     Impression & Plan    CMS Diagnoses: None    Code Status: Prior    Medical Decision Makin-year-old female with a history of DVT on anticoagulation and adherent to anticoagulation, previous TIA, hypertension, diabetes, who presents to the emergency department with a chief complaint of headache and pressure sinus region.  Was seen at urgent care and sent over for further evaluation given her headache and history of TIA.  She was quite stable nontoxic on evaluation, did have tenderness of the sinus regions.  She no focal neurologic deficit at all that would make me suspect TIA or CVA.  I doubt central venous sinus thrombosis given that she is adherent to her anticoagulation and overall stable.  Doubt intracranial abscess given that she is afebrile denies any fevers, no pain with extraocular movements overall nontoxic appearing.  Similarly dooubt meningitis/encephalitis as etiology. Doubt intracranial hemorrhage given that her symptoms have been present for several days she is stable.  Suspect that she does still have degree of sinusitis.  Lab work appeared did show some new leukopenia which I discussed with her and encouraged her to have repeated with her primary care provider.  Otherwise creatinine is mildly elevated from baseline however no signs of  an VERNON and she denies any nausea or vomiting able to tolerate p.o.  She felt improved after headache treatment here in the emergency department.  She was given a prescription for decongestant as well as Compazine, we discussed that starting antibiotics at this time would not necessarily be indicated as she has no fever, this is not symptoms greater than 10 days and she just recently completed a course of doxycycline.  She will follow-up with her primary care provider.  Strict return precautions were discussed and she was discharged stable condition.    Diagnosis:    ICD-10-CM    1. Congestion of paranasal sinus  R09.81       2. Acute recurrent frontal sinusitis  J01.11            Discharge Medications:  Discharge Medication List as of 2/12/2024  4:18 PM        START taking these medications    Details   guaiFENesin (MUCINEX) 600 MG 12 hr tablet Take 1 tablet (600 mg) by mouth 2 times daily as needed for congestion, Disp-10 tablet, R-0, E-Prescribe      prochlorperazine (COMPAZINE) 10 MG tablet Take 0.5 tablets (5 mg) by mouth every 6 hours as needed for nausea or vomiting, Disp-10 tablet, R-0, E-Prescribe              Scribe Disclosure:  I, Libra Mancilla, am serving as a scribe at 2:29 PM on 2/12/2024 to document services personally performed by Poppy Craig MD based on my observations and the provider's statements to me.     2/12/2024   Poppy Craig MD Wu Klasek, Connie, MD  02/13/24 1891

## 2024-03-05 NOTE — PROGRESS NOTES
__________________________________________________________      [facility] Monroy   [phone]  __________________________________________________________         History of Present Illness   Chief Complaint: Patient presents with:  Stroke: Stroke      Shana Ventura is a 58 year old female presenting for follow-up for stroke.     She was hospitalized at [facility] on January 21, 2024. Prior to the hospital stay, she had a past medical history of B-cell lymphoma in remission, spinal lymphoma mass, PE on Eliquis, smoker, diabetes, hypertension, chronic anemia, chronic pain..    She presented to the hospital with altered mental status and dizziness, feeling woozy, vomited several times.  She had had her Eliquis on hold in the week leading up to that admission in anticipation of a spinal nerve block. She was found to be COVID-positive as well..     She was started back on her Eliquis for recurrent stroke prevention. Since being discharged, she has been doing pretty well other than her  memory. She  loses her train of thought often. Short term memory seems problematic.  Regarding her energy it was not great before or after the stroke. In terms of her dizziness, nausea and vomiting, it was only that same day.  It was over in about a couple hours.      Since being discharged, she has been doing well.  Her A1c has come down 7.5 --> 6.2 A1c on 3/4/24    Still wants to plan to have back injections, going to wait a couple of months.  Nervous about having to hold meds again for the procedure.      Stroke Evaluation Summarized:    (Studies personally re-reviewed by me today or new results resulted and reviewed by me today are in BOLD below)  I personally reviewed the following neuroimaging studies today and the comments above reflect my own personal interpretation of the images: images: MRI brain and I also showed the brain MRI to the patient myself today.    Stroke Evaluation  Summarized     MRI/Head CT Punctate acute/subacute infarct in the left superior cerebellar peduncle    Intracranial Vasculature CTA: Left cavernous ICA and left carotid siphon mild stenosis, bilateral distal V4 moderate stenosis, left supraclinoid ICA 2 mm infundibulum versus aneurysm   Cervical Vasculature MRA: Motion degradation of the right V1 segment, on postcontrast images there is loss of flow related signal in the right V1  CTA: Right V1 segment with appearance of clot/thrombus, however favored to be artifactual      Echocardiogram EF 55-60%, normal left atrium, negative bubble   EKG/Telemetry Sinus bradycardia   Other Testing CT CAP: no evidence of malignancy        Labs Lab Results   Component Value Date    LDL 84 01/22/2024    A1C 7.5 (H) 01/21/2024    CTROPT 7 01/22/2024    INR 1.23 (H) 01/21/2024               Home Medications     Outpatient Medications Marked as Taking for the 3/6/24 encounter (Office Visit) with Dulce Maria Pearce PA-C   Medication Sig    ACCU-CHEK GUIDE test strip     acetaminophen (TYLENOL) 500 MG tablet Take 2 tablets (1,000 mg) by mouth every 6 hours as needed for pain    apixaban ANTICOAGULANT (ELIQUIS) 5 MG tablet Take 5 mg by mouth 2 times daily    atorvastatin (LIPITOR) 40 MG tablet Take 1 tablet (40 mg) by mouth daily    blood glucose monitoring (ACCU-CHEK FASTCLIX) lancets 2 times daily    cyclobenzaprine (FLEXERIL) 10 MG tablet Take 1 tablet (10 mg) by mouth 3 times daily as needed for muscle spasms    docusate sodium (COLACE) 100 MG capsule Take 100 mg by mouth 2 times daily    entecavir (BARACLUDE) 0.5 MG tablet Take 1 tablet (0.5 mg) by mouth daily    fentaNYL (DURAGESIC) 25 mcg/hr 72 hr patch Place 1 patch onto the skin every 72 hours remove old patch.    fluticasone (FLONASE) 50 MCG/ACT nasal spray Spray 2 sprays into both nostrils daily    hydrOXYzine HCl (ATARAX) 25 MG tablet Take 1-2 tablets by mouth every 6 hours as needed for anxiety    insulin glargine (LANTUS PEN)  "100 UNIT/ML pen Inject 6 Units Subcutaneous at bedtime    loratadine (CLARITIN) 10 MG tablet Take 10 mg by mouth daily    melatonin 3 MG tablet Take 3 mg by mouth at bedtime    oxyCODONE (ROXICODONE) 5 MG tablet Take 5-10 mg by mouth every 4 hours as needed for severe pain    Pregabalin (LYRICA) 200 MG capsule Take 200 mg by mouth 2 times daily    Prenatal 27-1 MG TABS Take 1 tablet by mouth daily    Semaglutide, 2 MG/DOSE, (OZEMPIC, 2 MG/DOSE,) 8 MG/3ML pen Inject 2 mg Subcutaneous every 7 days    sertraline (ZOLOFT) 100 MG tablet Take 200 mg by mouth daily    traZODone (DESYREL) 50 MG tablet Take 50 mg by mouth nightly as needed for sleep    vitamin D2 (ERGOCALCIFEROL) 48780 units (1250 mcg) capsule Take 50,000 Units by mouth once a week            Physical Examination     Physical Exam    Estimated body mass index is 42.61 kg/m  as calculated from the following:    Height as of this encounter: 1.702 m (5' 7\").    Weight as of this encounter: 123.4 kg (272 lb 0.8 oz).    BP (!) 134/91 (BP Location: Right arm)   Pulse 73   Ht 1.702 m (5' 7\")   Wt 123.4 kg (272 lb 0.8 oz)   LMP  (LMP Unknown)   SpO2 100%   BMI 42.61 kg/m         General Exam  General: Sitting up in chair in no acute distress    Neurologic:  Mental Status:  alert, oriented x 3, follows commands, speech clear and fluent  Cranial Nerves:  visual fields intact, PERRL, EOMI with normal smooth pursuit, facial sensation intact and symmetric, facial movements symmetric, hearing not formally tested but intact to conversation, palate elevation symmetric and uvula midline, no dysarthria, shoulder shrug strong bilaterally, tongue protrusion midline  Motor:  normal muscle tone and bulk, no abnormal movements, able to move all limbs spontaneously, strength 5/5 throughout upper and lower extremities, no pronator drift  Sensory:  light touch sensation intact and symmetric throughout upper and lower extremities, no extinction on double simultaneous stimulation "   Coordination:  normal finger-to-nose and heel-to-shin bilaterally without dysmetria, rapid alternating movements symmetric  Station/Gait:  deferred           Screenings and Questionnaires:     Tobacco:    Tobacco Use      Smoking status: Never      Smokeless tobacco: Never      Depression:      3/6/2024     7:49 AM 2/1/2024     9:47 AM   PHQ-2 ( 1999 [employer])   Q1: Little interest or pleasure in doing things 1 2   Q2: Feeling down, depressed or hopeless 1 2   PHQ-2 Score 2 4   Q1: Little interest or pleasure in doing things Several days More than half the days   Q2: Feeling down, depressed or hopeless Several days More than half the days   PHQ-2 Score 2 4             3/6/2024     7:52 AM   Stroke Questionnaire   Residual effects: Any residual effects from stroke? No, I feel totally back to how I was before the stroke   Level of independence: Could you live alone? Yes   Quality of Life: What work now or before stroke Employed full time   Medication: How do you take your meds Myself, from a pillbox that I set up   Risk Factor: Checking blood pressure at home Yes   Risk Factor: Usual blood pressure numbers 123/88   Risk Factor: Checking blood sugar at home Yes   Risk Factor: Usual blood sugar numbers 100 to 150   Risk Factor: Second-hand smoke at home No   Risk Factor: How much caffeine per day 1   Who completed this questionnaire? The patient independently             Assessment and Plan       1. Acute ischemic stroke of left superior cerebellar peduncle due to large-artery atherosclerosis versus ESUS.. She has moderate bilateral V4 stenosis and possible R vertebral artery occlusion. She is on chronic anticoagulation for history of pulmonary embolism, but treatment was interrupted due to planned nerve block.  Fortunately, her stroke was very small and she has recovered well    -Continue Eliquis 5 mg twice daily, contact us if any doctor wants to stop it for procedure or surgery.  -Mediterranean diet, exercise to get  the heart rate up most days of the week  -Recheck LDL to ensure within 40-70 range, ordered new lipid panel today  -Good long-term blood pressure control, less than 130/80 ideally.    2.  Diabetes  -Better control with hemoglobin A1c for goal of less than 7.0    3.  Chronic pain with plan for more invasive procedures for this  -I asked her to let us know what the proposal is regarding those upcoming procedures, ideally would like to wait at least 3 months from the date of the stroke      - Return for with BARBARA Pearce or other stroke BECCA, as needed.      Stroke Education provided.  She will call us with any questions.  For any acute neurologic deficits she was advised to  go directly to the hospital rather than call the clinic.    Dulce Maria Pearce PA-C  Neurology          ____________________________________________________________________    Billing:    I spent a total of 42 minutes on the day of the visit.   Time spent by me doing chart review, history and exam, documentation and further activities per the note

## 2024-03-06 ENCOUNTER — OFFICE VISIT (OUTPATIENT)
Dept: NEUROLOGY | Facility: CLINIC | Age: 59
End: 2024-03-06
Attending: NURSE PRACTITIONER
Payer: COMMERCIAL

## 2024-03-06 VITALS
SYSTOLIC BLOOD PRESSURE: 134 MMHG | BODY MASS INDEX: 42.7 KG/M2 | WEIGHT: 272.05 LBS | DIASTOLIC BLOOD PRESSURE: 91 MMHG | HEIGHT: 67 IN | HEART RATE: 73 BPM | OXYGEN SATURATION: 100 %

## 2024-03-06 DIAGNOSIS — I63.213: ICD-10-CM

## 2024-03-06 PROCEDURE — 99215 OFFICE O/P EST HI 40 MIN: CPT | Performed by: PHYSICIAN ASSISTANT

## 2024-03-06 NOTE — NURSING NOTE
Symptoms or concerns today: Memory concerns, is it from chemo or the stroke    Med comments:     Who the patient is here with today: Self    Lani Wells MA

## 2024-03-06 NOTE — LETTER
3/6/2024         RE: Shaan Ventura  [address] [place], [place] [id number]        Dear Colleague,    Thank you for referring your patient, Shana Ventura, to the [facility] NEUROLOGY [facility]. Please see a copy of my visit note below.    __________________________________________________________      [facility]   [phone]  __________________________________________________________         History of Present Illness   Chief Complaint: Patient presents with:  Stroke: Stroke      Shana Ventura is a 58 year old female presenting for follow-up for stroke.     She was hospitalized at [facility] on January 21, 2024. Prior to the hospital stay, she had a past medical history of B-cell lymphoma in remission, spinal lymphoma mass, PE on Eliquis, smoker, diabetes, hypertension, chronic anemia, chronic pain..    She presented to the hospital with altered mental status and dizziness, feeling woozy, vomited several times.  She had had her Eliquis on hold in the week leading up to that admission in anticipation of a spinal nerve block. She was found to be COVID-positive as well..     She was started back on her Eliquis for recurrent stroke prevention. Since being discharged, she has been doing pretty well other than her  memory. She  loses her train of thought often. Short term memory seems problematic.  Regarding her energy it was not great before or after the stroke. In terms of her dizziness, nausea and vomiting, it was only that same day.  It was over in about a couple hours.      Since being discharged, she has been doing well.  Her A1c has come down 7.5 --> 6.2 A1c on 3/4/24    Still wants to plan to have back injections, going to wait a couple of months.  Nervous about having to hold meds again for the procedure.      Stroke Evaluation Summarized:    (Studies personally re-reviewed by me today or new results resulted and reviewed by me today are  in BOLD below)  I personally reviewed the following neuroimaging studies today and the comments above reflect my own personal interpretation of the images: images: MRI brain and I also showed the brain MRI to the patient myself today.    Stroke Evaluation Summarized     MRI/Head CT Punctate acute/subacute infarct in the left superior cerebellar peduncle    Intracranial Vasculature CTA: Left cavernous ICA and left carotid siphon mild stenosis, bilateral distal V4 moderate stenosis, left supraclinoid ICA 2 mm infundibulum versus aneurysm   Cervical Vasculature MRA: Motion degradation of the right V1 segment, on postcontrast images there is loss of flow related signal in the right V1  CTA: Right V1 segment with appearance of clot/thrombus, however favored to be artifactual      Echocardiogram EF 55-60%, normal left atrium, negative bubble   EKG/Telemetry Sinus bradycardia   Other Testing CT CAP: no evidence of malignancy        Labs Lab Results   Component Value Date    LDL 84 01/22/2024    A1C 7.5 (H) 01/21/2024    CTROPT 7 01/22/2024    INR 1.23 (H) 01/21/2024               Home Medications     Outpatient Medications Marked as Taking for the 3/6/24 encounter (Office Visit) with Dulce Maria Pearce PA-C   Medication Sig     ACCU-CHEK GUIDE test strip      acetaminophen (TYLENOL) 500 MG tablet Take 2 tablets (1,000 mg) by mouth every 6 hours as needed for pain     apixaban ANTICOAGULANT (ELIQUIS) 5 MG tablet Take 5 mg by mouth 2 times daily     atorvastatin (LIPITOR) 40 MG tablet Take 1 tablet (40 mg) by mouth daily     blood glucose monitoring (ACCU-CHEK FASTCLIX) lancets 2 times daily     cyclobenzaprine (FLEXERIL) 10 MG tablet Take 1 tablet (10 mg) by mouth 3 times daily as needed for muscle spasms     docusate sodium (COLACE) 100 MG capsule Take 100 mg by mouth 2 times daily     entecavir (BARACLUDE) 0.5 MG tablet Take 1 tablet (0.5 mg) by mouth daily     fentaNYL (DURAGESIC) 25 mcg/hr 72 hr patch Place 1 patch onto  "the skin every 72 hours remove old patch.     fluticasone (FLONASE) 50 MCG/ACT nasal spray Spray 2 sprays into both nostrils daily     hydrOXYzine HCl (ATARAX) 25 MG tablet Take 1-2 tablets by mouth every 6 hours as needed for anxiety     insulin glargine (LANTUS PEN) 100 UNIT/ML pen Inject 6 Units Subcutaneous at bedtime     loratadine (CLARITIN) 10 MG tablet Take 10 mg by mouth daily     melatonin 3 MG tablet Take 3 mg by mouth at bedtime     oxyCODONE (ROXICODONE) 5 MG tablet Take 5-10 mg by mouth every 4 hours as needed for severe pain     Pregabalin (LYRICA) 200 MG capsule Take 200 mg by mouth 2 times daily     Prenatal 27-1 MG TABS Take 1 tablet by mouth daily     Semaglutide, 2 MG/DOSE, (OZEMPIC, 2 MG/DOSE,) 8 MG/3ML pen Inject 2 mg Subcutaneous every 7 days     sertraline (ZOLOFT) 100 MG tablet Take 200 mg by mouth daily     traZODone (DESYREL) 50 MG tablet Take 50 mg by mouth nightly as needed for sleep     vitamin D2 (ERGOCALCIFEROL) 71373 units (1250 mcg) capsule Take 50,000 Units by mouth once a week            Physical Examination     Physical Exam    Estimated body mass index is 42.61 kg/m  as calculated from the following:    Height as of this encounter: 1.702 m (5' 7\").    Weight as of this encounter: 123.4 kg (272 lb 0.8 oz).    BP (!) 134/91 (BP Location: Right arm)   Pulse 73   Ht 1.702 m (5' 7\")   Wt 123.4 kg (272 lb 0.8 oz)   LMP  (LMP Unknown)   SpO2 100%   BMI 42.61 kg/m         General Exam  General: Sitting up in chair in no acute distress    Neurologic:  Mental Status:  alert, oriented x 3, follows commands, speech clear and fluent  Cranial Nerves:  visual fields intact, PERRL, EOMI with normal smooth pursuit, facial sensation intact and symmetric, facial movements symmetric, hearing not formally tested but intact to conversation, palate elevation symmetric and uvula midline, no dysarthria, shoulder shrug strong bilaterally, tongue protrusion midline  Motor:  normal muscle tone and " bulk, no abnormal movements, able to move all limbs spontaneously, strength 5/5 throughout upper and lower extremities, no pronator drift  Sensory:  light touch sensation intact and symmetric throughout upper and lower extremities, no extinction on double simultaneous stimulation   Coordination:  normal finger-to-nose and heel-to-shin bilaterally without dysmetria, rapid alternating movements symmetric  Station/Gait:  deferred           Screenings and Questionnaires:     Tobacco:    Tobacco Use      Smoking status: Never      Smokeless tobacco: Never      Depression:      3/6/2024     7:49 AM 2/1/2024     9:47 AM   PHQ-2 ( 1999 Pf[employer])   Q1: Little interest or pleasure in doing things 1 2   Q2: Feeling down, depressed or hopeless 1 2   PHQ-2 Score 2 4   Q1: Little interest or pleasure in doing things Several days More than half the days   Q2: Feeling down, depressed or hopeless Several days More than half the days   PHQ-2 Score 2 4             3/6/2024     7:52 AM   Stroke Questionnaire   Residual effects: Any residual effects from stroke? No, I feel totally back to how I was before the stroke   Level of independence: Could you live alone? Yes   Quality of Life: What work now or before stroke Employed full time   Medication: How do you take your meds Myself, from a pillbox that I set up   Risk Factor: Checking blood pressure at home Yes   Risk Factor: Usual blood pressure numbers 123/88   Risk Factor: Checking blood sugar at home Yes   Risk Factor: Usual blood sugar numbers 100 to 150   Risk Factor: Second-hand smoke at home No   Risk Factor: How much caffeine per day 1   Who completed this questionnaire? The patient independently             Assessment and Plan       1. Acute ischemic stroke of left superior cerebellar peduncle due to large-artery atherosclerosis versus ESUS.. She has moderate bilateral V4 stenosis and possible R vertebral artery occlusion. She is on chronic anticoagulation for history of pulmonary  embolism, but treatment was interrupted due to planned nerve block.  Fortunately, her stroke was very small and she has recovered well    -Continue Eliquis 5 mg twice daily, contact us if any doctor wants to stop it for procedure or surgery.  -Mediterranean diet, exercise to get the heart rate up most days of the week  -Recheck LDL to ensure within 40-70 range, ordered new lipid panel today  -Good long-term blood pressure control, less than 130/80 ideally.    2.  Diabetes  -Better control with hemoglobin A1c for goal of less than 7.0    3.  Chronic pain with plan for more invasive procedures for this  -I asked her to let us know what the proposal is regarding those upcoming procedures, ideally would like to wait at least 3 months from the date of the stroke      - Return for with BARBARA Pearce or other stroke BECCA, as needed.      Stroke Education provided.  She will call us with any questions.  For any acute neurologic deficits she was advised to  go directly to the hospital rather than call the clinic.    Dulce Maria Pearce PA-C  Neurology          ____________________________________________________________________    Billing:    I spent a total of 42 minutes on the day of the visit.   Time spent by me doing chart review, history and exam, documentation and further activities per the note        Again, thank you for allowing me to participate in the care of your patient.        Sincerely,        Dulce Maria Pearce PA-C

## 2024-03-06 NOTE — PATIENT INSTRUCTIONS
Stroke RN coordinator Paola keller [phone]  Mediterranean Diet  Exercise that gets your heart rate up for at least 20 minutes on most days of the week.  Less artifical sweeteners  Cholesterol panel in the next 4 weeks

## 2024-03-06 NOTE — Clinical Note
Patient will be calling into clinic with what her [place] pain specialist wants her to do about holding eliquis for next procedure in (?) April, please help coordinate with communicating with team on service about if we are ok with what the proceduralist is suggesting, thanks!

## 2024-04-26 ENCOUNTER — TELEPHONE (OUTPATIENT)
Dept: CARE COORDINATION | Facility: CLINIC | Age: 59
End: 2024-04-26
Payer: COMMERCIAL

## 2024-04-26 NOTE — TELEPHONE ENCOUNTER
Stroke RN Care Coordination - Unable to Reach / Voicemail Note     Stroke RN Care Coordinator Outreach:  Clinic Care Coordination - Follow-up (Upcoming Procedure - AC Hold)     Outreach attempted x 1.      Left message on patient's voicemail with call back information and requested return call. Also sent mychart message explaining reasoning for outreach.    Stroke RN Care Coordinator will try to reach patient again in 1-2 business days.    Paola Quiñonez BS, RN, SCRN  RN Stroke Neurology Care Coordinator  [facility] Neuroscience Service Line

## 2024-04-26 NOTE — TELEPHONE ENCOUNTER
"----- Message from Dulce Maria Pearce PA-C sent at 4/23/2024  2:38 PM CDT -----  She and her family were just very concerned about making sure that her care is well coordinated so if you could call her after 4/24 even if just to say \"I see that they want to do your procedure on such and such date and hold your __ for __ days \" and let me know what the plan is, thanks!  ----- Message -----  From: Paola Quiñonez RN  Sent: 4/8/2024   3:51 PM CDT  To: Dulce Maria Pearce PA-C    Hey, you cc'd your note to me on this patient regarding an upcoming procedure. She is schedule with [facility] neurology on 4/24 with what looks to be her procedure following in May. I would assume she will discuss AC management then with [facility] neuro on 4/24. Did you still want me doing any specific outreach/follow-up?      Paola OROSCO, RN, SCRN  RN Stroke Neurology Care Coordinator  [facility] Neuroscience Service Line  "

## 2024-04-30 NOTE — TELEPHONE ENCOUNTER
Spoke with pt this morning. She states that her surgery team advised waiting to schedule until 90 days post stroke, so she does not currently have the procedure scheduled and now is not entirely sure she will proceed with the procedure at this time. I told her that I can follow up with her again in another few weeks to see how that all pans out and then we can go from there. I did provide her with my direct contact information and encouraged her to call me if she has any updates sooner or any other stroke related questions or concerns. Pt voiced understanding and was appreciative of the call. She had no other needs at this time.     RNCC will follow up with pt towards the end of May.       Paola Quiñonez BS, RN, SCRN  RN Stroke Neurology Care Coordinator  [facility] Neuroscience Service Line

## 2024-05-28 ENCOUNTER — DOCUMENTATION ONLY (OUTPATIENT)
Dept: CARE COORDINATION | Facility: CLINIC | Age: 59
End: 2024-05-28
Payer: COMMERCIAL

## 2024-05-28 NOTE — PROGRESS NOTES
Stroke RN Care Coordination - Chart Review Note    SITUATION     Shana Ventura is a 58 year old female who is receiving support for:  Chart Review Please (Stroke RNCC Review)    BACKGROUND     Spoke with pt a few weeks ago regarding surgical planning and possible medication hold. She had not decided on moving forward with the procedure at that time.     ASSESSMENT     Reviewed pt's chart and completed care everywhere update. Noted visits with her [facility] providers and it appears she underwent an steroid injection last week. Per provider notes, she has postponed her anticipated ablation that she originally dicussed with Julissa, our stroke PA.     PLAN     Since pt is not proceeding with ablation procedure at this time, RNCC will not be doing any further outreach attempts. Pt has been provided with my direct contact information as well as our main call line so she can contact our team if/when she does decide to proceed.     Paola Quiñonez BS, RN, SCRN  RN Stroke Neurology Care Coordinator  [facility] Neuroscience Service Line

## 2024-08-11 ENCOUNTER — HEALTH MAINTENANCE LETTER (OUTPATIENT)
Age: 59
End: 2024-08-11

## 2024-12-22 ENCOUNTER — HEALTH MAINTENANCE LETTER (OUTPATIENT)
Age: 59
End: 2024-12-22

## 2025-02-23 ENCOUNTER — HEALTH MAINTENANCE LETTER (OUTPATIENT)
Age: 60
End: 2025-02-23